# Patient Record
Sex: FEMALE | Race: OTHER | HISPANIC OR LATINO | URBAN - METROPOLITAN AREA
[De-identification: names, ages, dates, MRNs, and addresses within clinical notes are randomized per-mention and may not be internally consistent; named-entity substitution may affect disease eponyms.]

---

## 2019-07-05 ENCOUNTER — EMERGENCY (EMERGENCY)
Facility: HOSPITAL | Age: 84
LOS: 0 days | Discharge: HOME | End: 2019-07-05
Attending: EMERGENCY MEDICINE | Admitting: EMERGENCY MEDICINE
Payer: MEDICAID

## 2019-07-05 VITALS
WEIGHT: 119.93 LBS | OXYGEN SATURATION: 97 % | RESPIRATION RATE: 16 BRPM | SYSTOLIC BLOOD PRESSURE: 187 MMHG | HEART RATE: 72 BPM | DIASTOLIC BLOOD PRESSURE: 79 MMHG | TEMPERATURE: 98 F

## 2019-07-05 DIAGNOSIS — M25.551 PAIN IN RIGHT HIP: ICD-10-CM

## 2019-07-05 DIAGNOSIS — E11.9 TYPE 2 DIABETES MELLITUS WITHOUT COMPLICATIONS: ICD-10-CM
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
## 2019-07-05 DIAGNOSIS — M25.512 PAIN IN LEFT SHOULDER: ICD-10-CM

## 2019-07-05 DIAGNOSIS — I10 ESSENTIAL (PRIMARY) HYPERTENSION: ICD-10-CM
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
## 2019-07-05 LAB
ALBUMIN SERPL ELPH-MCNC: 4.4 G/DL — SIGNIFICANT CHANGE UP (ref 3.5–5.2)
ALP SERPL-CCNC: 76 U/L — SIGNIFICANT CHANGE UP (ref 30–115)
ALT FLD-CCNC: 17 U/L — SIGNIFICANT CHANGE UP (ref 0–41)
ANION GAP SERPL CALC-SCNC: 12 MMOL/L — SIGNIFICANT CHANGE UP (ref 7–14)
APTT BLD: 29.3 SEC — SIGNIFICANT CHANGE UP (ref 27–39.2)
AST SERPL-CCNC: 19 U/L — SIGNIFICANT CHANGE UP (ref 0–41)
BASOPHILS # BLD AUTO: 0.07 K/UL — SIGNIFICANT CHANGE UP (ref 0–0.2)
BASOPHILS NFR BLD AUTO: 0.3 % — SIGNIFICANT CHANGE UP (ref 0–1)
BILIRUB SERPL-MCNC: 0.5 MG/DL — SIGNIFICANT CHANGE UP (ref 0.2–1.2)
BUN SERPL-MCNC: 18 MG/DL — SIGNIFICANT CHANGE UP (ref 10–20)
CALCIUM SERPL-MCNC: 9.9 MG/DL — SIGNIFICANT CHANGE UP (ref 8.5–10.1)
CHLORIDE SERPL-SCNC: 95 MMOL/L — LOW (ref 98–110)
CO2 SERPL-SCNC: 25 MMOL/L — SIGNIFICANT CHANGE UP (ref 17–32)
CREAT SERPL-MCNC: 0.6 MG/DL — LOW (ref 0.7–1.5)
EOSINOPHIL # BLD AUTO: 0.29 K/UL — SIGNIFICANT CHANGE UP (ref 0–0.7)
EOSINOPHIL NFR BLD AUTO: 1.3 % — SIGNIFICANT CHANGE UP (ref 0–8)
ETHANOL SERPL-MCNC: <10 MG/DL — SIGNIFICANT CHANGE UP
GLUCOSE SERPL-MCNC: 194 MG/DL — HIGH (ref 70–99)
HCT VFR BLD CALC: 39.7 % — SIGNIFICANT CHANGE UP (ref 37–47)
HGB BLD-MCNC: 14.4 G/DL — SIGNIFICANT CHANGE UP (ref 12–16)
IMM GRANULOCYTES NFR BLD AUTO: 0.2 % — SIGNIFICANT CHANGE UP (ref 0.1–0.3)
INR BLD: 0.92 RATIO — SIGNIFICANT CHANGE UP (ref 0.65–1.3)
LACTATE SERPL-SCNC: 2.7 MMOL/L — HIGH (ref 0.5–2.2)
LIDOCAIN IGE QN: 19 U/L — SIGNIFICANT CHANGE UP (ref 7–60)
LYMPHOCYTES # BLD AUTO: 16.53 K/UL — HIGH (ref 1.2–3.4)
LYMPHOCYTES # BLD AUTO: 73.7 % — HIGH (ref 20.5–51.1)
MCHC RBC-ENTMCNC: 31.5 PG — HIGH (ref 27–31)
MCHC RBC-ENTMCNC: 36.3 G/DL — SIGNIFICANT CHANGE UP (ref 32–37)
MCV RBC AUTO: 86.9 FL — SIGNIFICANT CHANGE UP (ref 81–99)
MONOCYTES # BLD AUTO: 1.07 K/UL — HIGH (ref 0.1–0.6)
MONOCYTES NFR BLD AUTO: 4.8 % — SIGNIFICANT CHANGE UP (ref 1.7–9.3)
NEUTROPHILS # BLD AUTO: 4.41 K/UL — SIGNIFICANT CHANGE UP (ref 1.4–6.5)
NEUTROPHILS NFR BLD AUTO: 19.7 % — LOW (ref 42.2–75.2)
NRBC # BLD: 0 /100 WBCS — SIGNIFICANT CHANGE UP (ref 0–0)
NRBC # BLD: 0 /100 — SIGNIFICANT CHANGE UP (ref 0–0)
PLAT MORPH BLD: NORMAL — SIGNIFICANT CHANGE UP
PLATELET # BLD AUTO: 235 K/UL — SIGNIFICANT CHANGE UP (ref 130–400)
PLATELET COUNT - ESTIMATE: NORMAL — SIGNIFICANT CHANGE UP
POTASSIUM SERPL-MCNC: 4.4 MMOL/L — SIGNIFICANT CHANGE UP (ref 3.5–5)
POTASSIUM SERPL-SCNC: 4.4 MMOL/L — SIGNIFICANT CHANGE UP (ref 3.5–5)
PROT SERPL-MCNC: 7.7 G/DL — SIGNIFICANT CHANGE UP (ref 6–8)
PROTHROM AB SERPL-ACNC: 10.6 SEC — SIGNIFICANT CHANGE UP (ref 9.95–12.87)
RBC # BLD: 4.57 M/UL — SIGNIFICANT CHANGE UP (ref 4.2–5.4)
RBC # FLD: 15 % — HIGH (ref 11.5–14.5)
RBC BLD AUTO: NORMAL — SIGNIFICANT CHANGE UP
SODIUM SERPL-SCNC: 132 MMOL/L — LOW (ref 135–146)
WBC # BLD: 22.42 K/UL — HIGH (ref 4.8–10.8)
WBC # FLD AUTO: 22.42 K/UL — HIGH (ref 4.8–10.8)

## 2019-07-05 PROCEDURE — 74177 CT ABD & PELVIS W/CONTRAST: CPT | Mod: 26

## 2019-07-05 PROCEDURE — 73030 X-RAY EXAM OF SHOULDER: CPT | Mod: 26,LT

## 2019-07-05 PROCEDURE — 71045 X-RAY EXAM CHEST 1 VIEW: CPT | Mod: 26

## 2019-07-05 PROCEDURE — 99284 EMERGENCY DEPT VISIT MOD MDM: CPT

## 2019-07-05 PROCEDURE — 72125 CT NECK SPINE W/O DYE: CPT | Mod: 26

## 2019-07-05 PROCEDURE — 70450 CT HEAD/BRAIN W/O DYE: CPT | Mod: 26

## 2019-07-05 PROCEDURE — 71260 CT THORAX DX C+: CPT | Mod: 26

## 2019-07-05 PROCEDURE — 72170 X-RAY EXAM OF PELVIS: CPT | Mod: 26

## 2019-07-05 RX ORDER — ACETAMINOPHEN 500 MG
975 TABLET ORAL ONCE
Refills: 0 | Status: COMPLETED | OUTPATIENT
Start: 2019-07-05 | End: 2019-07-05

## 2019-07-05 RX ORDER — ALPRAZOLAM 0.25 MG
2 TABLET ORAL ONCE
Refills: 0 | Status: DISCONTINUED | OUTPATIENT
Start: 2019-07-05 | End: 2019-07-05

## 2019-07-05 RX ADMIN — Medication 975 MILLIGRAM(S): at 16:49

## 2019-07-05 NOTE — ED PROVIDER NOTE - ATTENDING CONTRIBUTION TO CARE
Pt is a 84yo female who fell yesterday and hit the back of her head and lower back, R hip.  No LOC, AMS, HA, vomiting.  Reports R hip pain and trouble walking.    Exam: R hip tenderness, stbale pelvis, soft NT abdomen, no rib tenderness, no spinal tenderness, normal neuro exam, 2+ radial and DP pulses, NAD  Imp: R hip fx, r/o ICH  Plan: labs, trauma pan scan, xr

## 2019-07-05 NOTE — ED PROVIDER NOTE - PHYSICAL EXAMINATION
Physical Exam    Vital Signs: I have reviewed the initial vital signs.  Constitutional: well-nourished, appears stated age, no acute distress  Eyes: Conjunctiva pink, Sclera clear, PERRLA, EOMI.  Cardiovascular: S1 and S2, regular rate, regular rhythm, well-perfused extremities, radial pulses equal and 2+, pedal pulse 2+ and equal.   Respiratory: unlabored respiratory effort, clear to auscultation bilaterally no wheezing, rales and rhonchi  Gastrointestinal: soft, non-tender abdomen, no pulsatile mass, normal bowl sounds  Musculoskeletal: supple neck, no lower extremity edema, no midline tenderness, unable to range right hip 2/2 pain, ttp over right hip, stable pelvis, left shoulder from, ttp over ac joint, neck from, no cspine ttp, from of other joints.  Integumentary: warm, dry, no rash, no bruising,   Neurologic: awake, alert, cranial nerves II-XII grossly intact, extremities’ motor and sensory functions grossly intact

## 2019-07-05 NOTE — ED PROVIDER NOTE - OBJECTIVE STATEMENT
82 yo female, pmh of dm and htn, presents to ed for evaluation after fall. Pt with daughter, states slipped last night, landed on back and hit head. No loc, no ac use. Pt today co right hip pain, worse with movement, no better with tylenol, described as sharp pain. Additionally co of left shoulder pain, worse with rom, no different with tylenol, described as aching. denies cp, sob, cough, abd pain, nvd, ha, numbness, tingling, dizziness, rash, dysuria.

## 2019-07-05 NOTE — ED PROVIDER NOTE - NS ED ROS FT
Constitutional: (-) fever, (-) chills  Eyes: (-) visual changes  ENT: (-)epistaxis  Cardiovascular: (-) chest pain, (-) syncope  Respiratory: (-) cough, (-) shortness of breath, (-) dyspnea,   Gastrointestinal: (-) vomiting, (-) diarrhea, (-)nausea  Musculoskeletal: (-) neck pain, (-) back pain, (+) right hip and left shoulder pain  Integumentary: (-) rash, (-) edema, (-) bruises, (+) abrasion  Neurological: (-) headache, (-) loc, (-) dizziness, (-) tingling, (-)numbness,   Peripheral Vascular: (-) leg swelling  :  (-)dysuria  Allergic/Immunologic: (-) pruritus

## 2019-07-05 NOTE — ED ADULT TRIAGE NOTE - CHIEF COMPLAINT QUOTE
As per daughter: 'She tripped and fall. Complain of lower back pain and also hit her head in on Aspirin 81 mg but denies LOC."

## 2021-05-19 NOTE — ED ADULT TRIAGE NOTE - SOURCE OF INFORMATION
Will switch to Claritin from Zyrtec, trial Flonase and olopatadine drops   Discussed that medications help symptoms but generally do not fully resolve symptoms Daughter

## 2022-11-15 ENCOUNTER — EMERGENCY (EMERGENCY)
Facility: HOSPITAL | Age: 87
LOS: 0 days | Discharge: HOME | End: 2022-11-16
Attending: EMERGENCY MEDICINE | Admitting: EMERGENCY MEDICINE

## 2022-11-15 VITALS
RESPIRATION RATE: 17 BRPM | DIASTOLIC BLOOD PRESSURE: 109 MMHG | WEIGHT: 110.01 LBS | TEMPERATURE: 97 F | HEART RATE: 62 BPM | SYSTOLIC BLOOD PRESSURE: 200 MMHG | OXYGEN SATURATION: 99 %

## 2022-11-15 DIAGNOSIS — I10 ESSENTIAL (PRIMARY) HYPERTENSION: ICD-10-CM

## 2022-11-15 DIAGNOSIS — E11.65 TYPE 2 DIABETES MELLITUS WITH HYPERGLYCEMIA: ICD-10-CM

## 2022-11-15 DIAGNOSIS — Z91.138 PATIENT'S UNINTENTIONAL UNDERDOSING OF MEDICATION REGIMEN FOR OTHER REASON: ICD-10-CM

## 2022-11-15 DIAGNOSIS — Z79.84 LONG TERM (CURRENT) USE OF ORAL HYPOGLYCEMIC DRUGS: ICD-10-CM

## 2022-11-15 DIAGNOSIS — Z20.822 CONTACT WITH AND (SUSPECTED) EXPOSURE TO COVID-19: ICD-10-CM

## 2022-11-15 DIAGNOSIS — Z91.048 OTHER NONMEDICINAL SUBSTANCE ALLERGY STATUS: ICD-10-CM

## 2022-11-15 DIAGNOSIS — E78.5 HYPERLIPIDEMIA, UNSPECIFIED: ICD-10-CM
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
## 2022-11-15 DIAGNOSIS — T50.996A UNDERDOSING OF OTHER DRUGS, MEDICAMENTS AND BIOLOGICAL SUBSTANCES, INITIAL ENCOUNTER: ICD-10-CM

## 2022-11-15 DIAGNOSIS — D72.829 ELEVATED WHITE BLOOD CELL COUNT, UNSPECIFIED: ICD-10-CM
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
## 2022-11-15 LAB
ALBUMIN SERPL ELPH-MCNC: 4.2 G/DL — SIGNIFICANT CHANGE UP (ref 3.5–5.2)
ALP SERPL-CCNC: 128 U/L — HIGH (ref 30–115)
ALT FLD-CCNC: 17 U/L — SIGNIFICANT CHANGE UP (ref 0–41)
ANION GAP SERPL CALC-SCNC: 7 MMOL/L — SIGNIFICANT CHANGE UP (ref 7–14)
AST SERPL-CCNC: 15 U/L — SIGNIFICANT CHANGE UP (ref 0–41)
B-OH-BUTYR SERPL-SCNC: <0.2 MMOL/L — SIGNIFICANT CHANGE UP
BASE EXCESS BLDV CALC-SCNC: 4 MMOL/L — HIGH (ref -2–3)
BASOPHILS # BLD AUTO: 0.09 K/UL — SIGNIFICANT CHANGE UP (ref 0–0.2)
BASOPHILS NFR BLD AUTO: 0.3 % — SIGNIFICANT CHANGE UP (ref 0–1)
BILIRUB SERPL-MCNC: 0.4 MG/DL — SIGNIFICANT CHANGE UP (ref 0.2–1.2)
BUN SERPL-MCNC: 14 MG/DL — SIGNIFICANT CHANGE UP (ref 10–20)
CA-I SERPL-SCNC: 1.22 MMOL/L — SIGNIFICANT CHANGE UP (ref 1.15–1.33)
CALCIUM SERPL-MCNC: 9.7 MG/DL — SIGNIFICANT CHANGE UP (ref 8.4–10.5)
CHLORIDE SERPL-SCNC: 98 MMOL/L — SIGNIFICANT CHANGE UP (ref 98–110)
CO2 SERPL-SCNC: 28 MMOL/L — SIGNIFICANT CHANGE UP (ref 17–32)
CREAT SERPL-MCNC: 0.5 MG/DL — LOW (ref 0.7–1.5)
EGFR: 91 ML/MIN/1.73M2 — SIGNIFICANT CHANGE UP
EOSINOPHIL # BLD AUTO: 0.55 K/UL — SIGNIFICANT CHANGE UP (ref 0–0.7)
EOSINOPHIL NFR BLD AUTO: 1.9 % — SIGNIFICANT CHANGE UP (ref 0–8)
GAS PNL BLDV: 130 MMOL/L — LOW (ref 136–145)
GAS PNL BLDV: SIGNIFICANT CHANGE UP
GLUCOSE SERPL-MCNC: 175 MG/DL — HIGH (ref 70–99)
HCO3 BLDV-SCNC: 30 MMOL/L — HIGH (ref 22–29)
HCT VFR BLD CALC: 36.1 % — LOW (ref 37–47)
HCT VFR BLDA CALC: 38 % — LOW (ref 39–51)
HGB BLD CALC-MCNC: 12.6 G/DL — SIGNIFICANT CHANGE UP (ref 12.6–17.4)
HGB BLD-MCNC: 12.3 G/DL — SIGNIFICANT CHANGE UP (ref 12–16)
IMM GRANULOCYTES NFR BLD AUTO: 0.1 % — SIGNIFICANT CHANGE UP (ref 0.1–0.3)
LACTATE BLDV-MCNC: 0.6 MMOL/L — SIGNIFICANT CHANGE UP (ref 0.5–2)
LYMPHOCYTES # BLD AUTO: 24.21 K/UL — HIGH (ref 1.2–3.4)
LYMPHOCYTES # BLD AUTO: 82 % — HIGH (ref 20.5–51.1)
MAGNESIUM SERPL-MCNC: 2.1 MG/DL — SIGNIFICANT CHANGE UP (ref 1.8–2.4)
MANUAL SMEAR VERIFICATION: SIGNIFICANT CHANGE UP
MCHC RBC-ENTMCNC: 29 PG — SIGNIFICANT CHANGE UP (ref 27–31)
MCHC RBC-ENTMCNC: 34.1 G/DL — SIGNIFICANT CHANGE UP (ref 32–37)
MCV RBC AUTO: 85.1 FL — SIGNIFICANT CHANGE UP (ref 81–99)
MONOCYTES # BLD AUTO: 1.46 K/UL — HIGH (ref 0.1–0.6)
MONOCYTES NFR BLD AUTO: 4.9 % — SIGNIFICANT CHANGE UP (ref 1.7–9.3)
NEUTROPHILS # BLD AUTO: 3.18 K/UL — SIGNIFICANT CHANGE UP (ref 1.4–6.5)
NEUTROPHILS NFR BLD AUTO: 10.8 % — LOW (ref 42.2–75.2)
NRBC # BLD: 0 /100 WBCS — SIGNIFICANT CHANGE UP (ref 0–0)
NRBC # BLD: 0 /100 — SIGNIFICANT CHANGE UP (ref 0–0)
PCO2 BLDV: 48 MMHG — HIGH (ref 39–42)
PH BLDV: 7.4 — SIGNIFICANT CHANGE UP (ref 7.32–7.43)
PLAT MORPH BLD: NORMAL — SIGNIFICANT CHANGE UP
PLATELET # BLD AUTO: 182 K/UL — SIGNIFICANT CHANGE UP (ref 130–400)
PO2 BLDV: 16 MMHG — SIGNIFICANT CHANGE UP
POTASSIUM BLDV-SCNC: 5.6 MMOL/L — HIGH (ref 3.5–5.1)
POTASSIUM SERPL-MCNC: 4.6 MMOL/L — SIGNIFICANT CHANGE UP (ref 3.5–5)
POTASSIUM SERPL-SCNC: 4.6 MMOL/L — SIGNIFICANT CHANGE UP (ref 3.5–5)
PROT SERPL-MCNC: 7 G/DL — SIGNIFICANT CHANGE UP (ref 6–8)
RBC # BLD: 4.24 M/UL — SIGNIFICANT CHANGE UP (ref 4.2–5.4)
RBC # FLD: 14 % — SIGNIFICANT CHANGE UP (ref 11.5–14.5)
RBC BLD AUTO: NORMAL — SIGNIFICANT CHANGE UP
SAO2 % BLDV: 20.5 % — SIGNIFICANT CHANGE UP
SODIUM SERPL-SCNC: 133 MMOL/L — LOW (ref 135–146)
VARIANT LYMPHS # BLD: 15 % — HIGH (ref 0–5)
WBC # BLD: 29.53 K/UL — HIGH (ref 4.8–10.8)
WBC # FLD AUTO: 29.53 K/UL — HIGH (ref 4.8–10.8)

## 2022-11-15 PROCEDURE — 93010 ELECTROCARDIOGRAM REPORT: CPT

## 2022-11-15 PROCEDURE — 99285 EMERGENCY DEPT VISIT HI MDM: CPT

## 2022-11-15 RX ORDER — SODIUM CHLORIDE 9 MG/ML
1000 INJECTION, SOLUTION INTRAVENOUS ONCE
Refills: 0 | Status: COMPLETED | OUTPATIENT
Start: 2022-11-15 | End: 2022-11-15

## 2022-11-15 RX ADMIN — SODIUM CHLORIDE 1000 MILLILITER(S): 9 INJECTION, SOLUTION INTRAVENOUS at 22:15

## 2022-11-15 NOTE — ED ADULT TRIAGE NOTE - CHIEF COMPLAINT QUOTE
pt has not taken her at home medication for about a week fs at home 300. Pt feels good " no complaints" as per daughter

## 2022-11-15 NOTE — ED ADULT NURSE NOTE - CODE STROKE ACTIVE YN
Outpatient Physical Therapy Ortho Treatment Note/Discharge Summary  [facility]     Patient Name: Mela Rodrigues  : 1988  MRN: [id number]  Today's Date: 8/15/2022      Visit Date: 08/15/2022     Patient seen for 6 PT sessions.  Patient reports 10-15% of improvement.  Next MD appt: PRN.  Recertification: N/A.    Therapy Diagnosis: Chronic LBP        Visit Dx:    ICD-10-CM ICD-9-CM   1. Lumbar back pain  M54.50 724.2   2. Chronic low back pain without sciatica, unspecified back pain laterality  M54.50 724.2    G89.29 338.29       There is no problem list on file for this patient.       Past Medical History:   Diagnosis Date   • Anxiety    • Asthma    • Diabetes mellitus (HCC)    • Hypertension         No past surgical history on file.     PT Ortho     Row Name 08/15/22 1000       Precautions and Contraindications    Precautions/Limitations no known precautions/limitations  -AJ       Head/Neck/Trunk    Trunk Extension AROM 20°  -AJ    Trunk Flexion AROM 72°  -AJ    Trunk Lt Lateral Flexion AROM 100% of range, WNL  -AJ    Trunk Rt Lateral Flexion AROM 100% of range, WNL  -AJ    Trunk Lt Rotation AROM 75% of range  -AJ    Trunk Rt Rotation AROM 75% of range  -AJ       MMT (Manual Muscle Testing)    General MMT Comments B hip flexion 4-/5  -AJ          User Key  (r) = Recorded By, (t) = Taken By, (c) = Cosigned By    Initials Name Provider Type    Concepcion Carpio, PT DPT Physical Therapist                             PT Assessment/Plan     Row Name 08/15/22 1100          PT Assessment    Assessment Comments Patient has met most goals and still only reports 10-15% improvement overall. She has loss of AROM and strength in hip flexors still with no significant changes in those since initial evaluation. Has completed 6 visits of PT and is I with all HEP exercises. Issued GTB for HEP progression. Also showed good ergonomics with household tasks after instruction.  -AJ            PT Plan    PT  "Frequency --  N/A  -AJ     PT Plan Comments D/C today with final HEP.  -AJ           User Key  (r) = Recorded By, (t) = Taken By, (c) = Cosigned By    Initials Name Provider Type    Concepcion Carpio, PT DPT Physical Therapist                     OP Exercises     Row Name 08/15/22 1000             Subjective Comments    Subjective Comments patient reports she has bene fighting spasms and a migrane.  -AJ              Subjective Pain    Able to rate subjective pain? yes  -AJ      Pre-Treatment Pain Level --  5-6/10  -AJ              Exercise 1    Exercise Name 1 Pro II LE bike for posture re ed, strength and endurance  -AJ      Time 1 10 minutes  -AJ      Additional Comments L 7.0  -AJ              Exercise 2    Exercise Name 2 B St. HS S  -AJ      Reps 2 2  -AJ      Time 2 30 seconds  -AJ              Exercise 3    Exercise Name 3 B sitting piriformis S  -AJ      Reps 3 2  -AJ      Time 3 30 seconds  -AJ              Exercise 4    Exercise Name 4 Sit to stands with umbar extensions  -AJ      Reps 4 20  -AJ      Time 4 5\" hold  -AJ              Exercise 5    Exercise Name 5 measurements  -AJ              Exercise 6    Exercise Name 6 B Fwd step up with opp hip ext  -AJ      Reps 6 15 each  -AJ      Additional Comments 6 step  -AJ              Exercise 7    Exercise Name 7 B lateral step up with opp hip abd  -AJ      Reps 7 15 each LE  -AJ      Additional Comments 6\" step  -AJ              Exercise 8    Exercise Name 8 B retro step up with opp hip flexion  -AJ      Reps 8 15 each  -AJ      Additional Comments 6\" step  -AJ              Exercise 9    Exercise Name 9 Bridges  -AJ      Reps 9 20  -AJ      Time 9 5\" hold  -AJ      Additional Comments UE \"X\"  -AJ              Exercise 10    Exercise Name 10 HL B hip add  -AJ      Reps 10 20  -AJ      Time 10 5\" hold  -AJ              Exercise 11    Exercise Name 11 HL B hip abd  -AJ      Reps 11 20  -AJ      Time 11 5\" hold  -AJ      Additional Comments GTB  -AJ       " "       Exercise 12    Exercise Name 12 HLM  -      Time 12 5\" holds alt for 3 minutes  -      Additional Comments GTB  -              Exercise 13    Exercise Name 13 B SKTC S  -      Reps 13 2  -      Time 13 30 seconds  -              Exercise 14    Exercise Name 14 LTR  -      Reps 14 10  -      Time 14 10\" hold  -              Exercise 15    Exercise Name 15 housework ergnomics  -              Exercise 16    Exercise Name 16 lifting techniques  -            User Key  (r) = Recorded By, (t) = Taken By, (c) = Cosigned By    Initials Name Provider Type    Concepcion Carpio, PT DPT Physical Therapist            All therapeutic interventions performed today were to address current functional limitations and/or deficits in addressing all physical therapy goals.                      PT OP Goals     Row Name 08/15/22 1100          PT Short Term Goals    STG 1 I with HEP and have additions/changes by next recertification  -     STG 1 Progress Met  -     STG 2 Patient able to show proper log roll technique.  -     STG 2 Progress Progressing  -     STG 3 Patient to be more aware of posture and posture correction techniques  -     STG 3 Progress Met  -     STG 4 AROM B Lumbar ROT >= 75% of range  -     STG 4 Progress Met  -     STG 5 AROM lumbar extension >= 20°.  -     STG 5 Progress Met  -     STG 6 B hip flexion >= 4+/5  -     STG 6 Progress Not Met  -            Long Term Goals    LTG 1 Patient to have AROM for the lumbar spine all WNL, no increase in pain.  -     LTG 1 Progress Partially Met  -     LTG 2 B LE 5/5 with no increase in pain.  -     LTG 2 Progress Partially Met  -     LTG 3 Patient able to perform 20 Bridges with UE \"X\" over Pball with no increase in pain.  -     LTG 3 Progress Met  -     LTG 4 Patient able to control hyperextension present in B knees  -     LTG 4 Progress Partially Met  -     LTG 4 Progress Comments Still requires some " cueing  -     LTG 5 Patient able to show proper lifting technique floor to waist with no increase in pain.  -     LTG 5 Progress Met  -     LTG 6 Patient able to show proper ergonomics for mopping/sweeping/vacuuming.  -     LTG 6 Progress Met  -     LTG 7 I with final HEP.  -     LTG 7 Progress Met  -            Time Calculation    PT Goal Re-Cert Due Date --  N/A  -           User Key  (r) = Recorded By, (t) = Taken By, (c) = Cosigned By    Initials Name Provider Type    Concepcion Carpio, PT DPT Physical Therapist                Therapy Education  Given: HEP, Posture/body mechanics  Program: Reinforced, New  How Provided: Verbal, Demonstration, Written  Provided to: Patient  Level of Understanding: Teach back education performed, Verbalized, Demonstrated              Time Calculation:   Start Time: 1048  Stop Time: 1145  Time Calculation (min): 57 min  Total Timed Code Minutes- PT: 57 minute(s)  Therapy Charges for Today     Code Description Service Date Service Provider Modifiers Qty    59690627593  PT THERAPEUTIC ACT EA 15 MIN 8/15/2022 Concepcion Peña, PT DPT GP 1    [id number]  PT THER SUPP EA 15 MIN 8/15/2022 Concepcion Peña, PT DPT GP 1    [id number] HC PT THER PROC EA 15 MIN 8/15/2022 Concepcion Peña, PT DPT GP 3                OP PT Discharge Summary  Date of Discharge: 08/15/22  Reason for Discharge: Independent  Outcomes Achieved: Patient able to partially acheive established goals, Refer to plan of care for updates on goals achieved  Discharge Destination: Home with home program      This document has been electronically signed by Concepcion Peña PT DPT, CSCS on August 15, 2022 11:49 CDT     No

## 2022-11-15 NOTE — ED ADULT NURSE NOTE - NSIMPLEMENTINTERV_GEN_ALL_ED
Implemented All Universal Safety Interventions:  [place] to call system. Call bell, personal items and telephone within reach. Instruct patient to call for assistance. Room bathroom lighting operational. Non-slip footwear when patient is off stretcher. Physically safe environment: no spills, clutter or unnecessary equipment. Stretcher in lowest position, wheels locked, appropriate side rails in place.

## 2022-11-16 VITALS
HEART RATE: 65 BPM | RESPIRATION RATE: 18 BRPM | OXYGEN SATURATION: 99 % | SYSTOLIC BLOOD PRESSURE: 160 MMHG | DIASTOLIC BLOOD PRESSURE: 71 MMHG

## 2022-11-16 PROBLEM — E11.9 TYPE 2 DIABETES MELLITUS WITHOUT COMPLICATIONS: Chronic | Status: ACTIVE | Noted: 2019-07-05

## 2022-11-16 PROBLEM — I10 ESSENTIAL (PRIMARY) HYPERTENSION: Chronic | Status: ACTIVE | Noted: 2019-07-05

## 2022-11-16 LAB — SARS-COV-2 RNA SPEC QL NAA+PROBE: SIGNIFICANT CHANGE UP

## 2022-11-16 RX ORDER — LOSARTAN POTASSIUM 100 MG/1
1 TABLET, FILM COATED ORAL
Qty: 30 | Refills: 0
Start: 2022-11-16 | End: 2022-11-30

## 2022-11-16 RX ORDER — HYDROCHLOROTHIAZIDE 25 MG
1 TABLET ORAL
Qty: 15 | Refills: 0
Start: 2022-11-16 | End: 2022-11-30

## 2022-11-16 RX ORDER — METFORMIN HYDROCHLORIDE 850 MG/1
1 TABLET ORAL
Qty: 30 | Refills: 0
Start: 2022-11-16 | End: 2022-11-30

## 2022-11-16 RX ORDER — ATORVASTATIN CALCIUM 80 MG/1
1 TABLET, FILM COATED ORAL
Qty: 15 | Refills: 0
Start: 2022-11-16 | End: 2022-11-30

## 2022-11-16 RX ORDER — NIFEDIPINE 30 MG
1 TABLET, EXTENDED RELEASE 24 HR ORAL
Qty: 15 | Refills: 0
Start: 2022-11-16 | End: 2022-11-30

## 2022-11-16 NOTE — ED PROVIDER NOTE - PROGRESS NOTE DETAILS
Patient remained asymptomatic while in ED.  Labs significant for leukocytosis, patient does not have any signs or symptoms of infection.  Discussed results with patient and daughter at bedside instructed to follow-up with PMD and hematology for further testing.  Return precautions provided, patient agreeable to discharge.

## 2022-11-16 NOTE — ED PROVIDER NOTE - PHYSICAL EXAMINATION
VITAL SIGNS: I have reviewed nursing notes and confirm.  CONSTITUTIONAL: Well-developed; well-nourished; in no acute distress.  SKIN: Skin exam is warm and dry, no acute rash.  HEAD: Normocephalic; atraumatic.  EYES: Conjunctiva and sclera clear.  ENT: No nasal discharge; airway clear.   CARD: S1, S2 normal; no murmurs, gallops, or rubs. Regular rate and rhythm.  RESP: No wheezes, rales or rhonchi. Speaking in full sentences.   ABD: Normal bowel sounds; soft; non-distended; non-tender; No rebound or guarding. No CVA tenderness.  EXT: Normal ROM. No clubbing, cyanosis or edema.   NEURO: Alert, oriented. Grossly unremarkable. No focal deficits.

## 2022-11-16 NOTE — ED PROVIDER NOTE - NSFOLLOWUPINSTRUCTIONS_ED_ALL_ED_FT
Hyperglycemia  Hyperglycemia occurs when the level of sugar (glucose) in the blood is too high. Glucose is a type of sugar that provides the body's main source of energy. Certain hormones (insulin and glucagon) control the level of glucose in the blood. Insulin lowers blood glucose, and glucagon increases blood glucose. Hyperglycemia can result from having too little insulin in the bloodstream, or from the body not responding normally to insulin.  Hyperglycemia occurs most often in people who have diabetes (diabetes mellitus), but it can happen in people who do not have diabetes. It can develop quickly, and it can be life-threatening if it causes you to become severely dehydrated (diabetic ketoacidosis or hyperglycemic hyperosmolar state). Severe hyperglycemia is a medical emergency.  What are the causes?  If you have diabetes, hyperglycemia may be caused by:  Diabetes medicine.Medicines that increase blood glucose or affect your diabetes control.Not eating enough, or not eating often enough.Changes in physical activity level.Being sick or having an infection.If you have prediabetes or undiagnosed diabetes:  Hyperglycemia may be caused by those conditions.If you do not have diabetes, hyperglycemia may be caused by:  Certain medicines, including steroid medicines, beta-blockers, epinephrine, and thiazide diuretics.Stress.Serious illness.Surgery.Diseases of the pancreas.Infection.What increases the risk?  Hyperglycemia is more likely to develop in people who have risk factors for diabetes, such as:  Having a family member with diabetes.Having a gene for type 1 diabetes that is passed from parent to child (inherited).Living in an area with cold weather conditions.Exposure to certain viruses.Certain conditions in which the body's disease-fighting (immune) system attacks itself (autoimmune disorders).Being overweight or obese.Having an inactive (sedentary) lifestyle.Having been diagnosed with insulin resistance.Having a history of prediabetes, gestational diabetes, or polycystic ovarian syndrome (PCOS).Being of American-, -American, /, or / descent.What are the signs or symptoms?  Hyperglycemia may not cause any symptoms. If you do have symptoms, they may include early warning signs, such as:  Increased thirst.Hunger.Feeling very tired.Needing to urinate more often than usual.Blurry vision.Other symptoms may develop if hyperglycemia gets worse, such as:  Dry mouth.Loss of appetite.Fruity-smelling breath.Weakness.Unexpected or rapid weight gain or weight loss.Tingling or numbness in the hands or feet.Headache.Skin that does not quickly return to normal after being lightly pinched and released (poor skin turgor).Abdominal pain.Cuts or bruises that are slow to heal.How is this diagnosed?  Hyperglycemia is diagnosed with a blood test to measure your blood glucose level. This blood test is usually done while you are having symptoms. Your health care provider may also do a physical exam and review your medical history.  You may have more tests to determine the cause of your hyperglycemia, such as:  A fasting blood glucose (FBG) test. You will not be allowed to eat (you will fast) for at least 8 hours before a blood sample is taken.An A1c (hemoglobin A1c) blood test. This provides information about blood glucose control over the previous 2–3 months.An oral glucose tolerance test (OGTT). This measures your blood glucose at two times:  After fasting. This is your baseline blood glucose level.Two hours after drinking a beverage that contains glucose.How is this treated?  Treatment depends on the cause of your hyperglycemia. Treatment may include:  Taking medicine to regulate your blood glucose levels. If you take insulin or other diabetes medicines, your medicine or dosage may be adjusted.Lifestyle changes, such as exercising more, eating healthier foods, or losing weight.Treating an illness or infection, if this caused your hyperglycemia.Checking your blood glucose more often.Stopping or reducing steroid medicines, if these caused your hyperglycemia.If your hyperglycemia becomes severe and it results in hyperglycemic hyperosmolar state, you must be hospitalized and given IV fluids.  Follow these instructions at home:  Image   General instructions     Take over-the-counter and prescription medicines only as told by your health care provider.Do not use any products that contain nicotine or tobacco, such as cigarettes and e-cigarettes. If you need help quitting, ask your health care provider.Limit alcohol intake to no more than 1 drink per day for nonpregnant women and 2 drinks per day for men. One drink equals 12 oz of beer, 5 oz of wine, or 1½ oz of hard liquor.Learn to manage stress. If you need help with this, ask your health care provider.Keep all follow-up visits as told by your health care provider. This is important.Eating and drinking     Image   Maintain a healthy weight.Exercise regularly, as directed by your health care provider.Stay hydrated, especially when you exercise, get sick, or spend time in hot temperatures.Eat healthy foods, such as:  Lean proteins.Complex carbohydrates.Fresh fruits and vegetables.Low-fat dairy products.Healthy fats.Drink enough fluid to keep your urine clear or pale yellow.If you have diabetes:     Make sure you know the symptoms of hyperglycemia.Follow your diabetes management plan, as told by your health care provider. Make sure you:  Take your insulin and medicines as directed.Follow your exercise plan.Follow your meal plan. Eat on time, and do not skip meals.Check your blood glucose as often as directed. Make sure to check your blood glucose before and after exercise. If you exercise longer or in a different way than usual, check your blood glucose more often.Follow your sick day plan whenever you cannot eat or drink normally. Make this plan in advance with your health care provider.Share your diabetes management plan with people in your workplace, school, and household.Check your urine for ketones when you are ill and as told by your health care provider.Carry a medical alert card or wear medical alert jewelry.Contact a health care provider if:  Your blood glucose is at or above 240 mg/dL (13.3 mmol/L) for 2 days in a row.You have problems keeping your blood glucose in your target range.You have frequent episodes of hyperglycemia.Get help right away if:  You have difficulty breathing.You have a change in how you think, feel, or act (mental status).You have nausea or vomiting that does not go away.These symptoms may represent a serious problem that is an emergency. Do not wait to see if the symptoms will go away. Get medical help right away. Call your local emergency services (911 in the [place] Do not drive yourself to the hospital.   Summary  Hyperglycemia occurs when the level of sugar (glucose) in the blood is too high.Hyperglycemia is diagnosed with a blood test to measure your blood glucose level. This blood test is usually done while you are having symptoms. Your health care provider may also do a physical exam and review your medical history.If you have diabetes, follow your diabetes management plan as told by your health care provider.Contact your health care provider if you have problems keeping your blood glucose in your target range.    Hypertension  Hypertension, commonly called high blood pressure, is when the force of blood pumping through the arteries is too strong. The arteries are the blood vessels that carry blood from the heart throughout the body. Hypertension forces the heart to work harder to pump blood and may cause arteries to become narrow or stiff. Having untreated or uncontrolled hypertension can cause heart attacks, strokes, kidney disease, and other problems.    A blood pressure reading consists of a higher number over a lower number. Ideally, your blood pressure should be below 120/80. The first ("top") number is called the systolic pressure. It is a measure of the pressure in your arteries as your heart beats. The second ("bottom") number is called the diastolic pressure. It is a measure of the pressure in your arteries as the heart relaxes.    What are the causes?  The cause of this condition is not known.    What increases the risk?  Some risk factors for high blood pressure are under your control. Others are not.    Factors you can change     Smoking.  Having type 2 diabetes mellitus, high cholesterol, or both.  Not getting enough exercise or physical activity.  Being overweight.  Having too much fat, sugar, calories, or salt (sodium) in your diet.  Drinking too much alcohol.  Factors that are difficult or impossible to change     Having chronic kidney disease.  Having a family history of high blood pressure.  Age. Risk increases with age.  Race. You may be at higher risk if you are -American.  Gender. Men are at higher risk than women before age 45. After age 65, women are at higher risk than men.  Having obstructive sleep apnea.  Stress.  What are the signs or symptoms?  Extremely high blood pressure (hypertensive crisis) may cause:    Headache.  Anxiety.  Shortness of breath.  Nosebleed.  Nausea and vomiting.  Severe chest pain.  Jerky movements you cannot control (seizures).    How is this diagnosed?  This condition is diagnosed by measuring your blood pressure while you are seated, with your arm resting on a surface. The cuff of the blood pressure monitor will be placed directly against the skin of your upper arm at the level of your heart. It should be measured at least twice using the same arm. Certain conditions can cause a difference in blood pressure between your right and left arms.    Certain factors can cause blood pressure readings to be lower or higher than normal (elevated) for a short period of time:    When your blood pressure is higher when you are in a health care provider's office than when you are at home, this is called white coat hypertension. Most people with this condition do not need medicines.  When your blood pressure is higher at home than when you are in a health care provider's office, this is called masked hypertension. Most people with this condition may need medicines to control blood pressure.    If you have a high blood pressure reading during one visit or you have normal blood pressure with other risk factors:    You may be asked to return on a different day to have your blood pressure checked again.  You may be asked to monitor your blood pressure at home for 1 week or longer.    If you are diagnosed with hypertension, you may have other blood or imaging tests to help your health care provider understand your overall risk for other conditions.    How is this treated?  This condition is treated by making healthy lifestyle changes, such as eating healthy foods, exercising more, and reducing your alcohol intake. Your health care provider may prescribe medicine if lifestyle changes are not enough to get your blood pressure under control, and if:    Your systolic blood pressure is above 130.  Your diastolic blood pressure is above 80.    Your personal target blood pressure may vary depending on your medical conditions, your age, and other factors.    Follow these instructions at home:  Eating and drinking     Eat a diet that is high in fiber and potassium, and low in sodium, added sugar, and fat. An example eating plan is called the DASH (Dietary Approaches to Stop Hypertension) diet. To eat this way:    Eat plenty of fresh fruits and vegetables. Try to fill half of your plate at each meal with fruits and vegetables.  Eat whole grains, such as whole wheat pasta, brown rice, or whole grain bread. Fill about one quarter of your plate with whole grains.  Eat or drink low-fat dairy products, such as skim milk or low-fat yogurt.  Avoid fatty cuts of meat, processed or cured meats, and poultry with skin. Fill about one quarter of your plate with lean proteins, such as fish, chicken without skin, beans, eggs, and tofu.  Avoid premade and processed foods. These tend to be higher in sodium, added sugar, and fat.    Reduce your daily sodium intake. Most people with hypertension should eat less than 1,500 mg of sodium a day.  ImageLimit alcohol intake to no more than 1 drink a day for nonpregnant women and 2 drinks a day for men. One drink equals 12 oz of beer, 5 oz of wine, or 1½ oz of hard liquor.  Lifestyle     Work with your health care provider to maintain a healthy body weight or to lose weight. Ask what an ideal weight is for you.  Get at least 30 minutes of exercise that causes your heart to beat faster (aerobic exercise) most days of the week. Activities may include walking, swimming, or biking.  Include exercise to strengthen your muscles (resistance exercise), such as pilates or lifting weights, as part of your weekly exercise routine. Try to do these types of exercises for 30 minutes at least 3 days a week.  Do not use any products that contain nicotine or tobacco, such as cigarettes and e-cigarettes. If you need help quitting, ask your health care provider.  Monitor your blood pressure at home as told by your health care provider.  Keep all follow-up visits as told by your health care provider. This is important.  Medicines     Take over-the-counter and prescription medicines only as told by your health care provider. Follow directions carefully. Blood pressure medicines must be taken as prescribed.  Do not skip doses of blood pressure medicine. Doing this puts you at risk for problems and can make the medicine less effective.  Ask your health care provider about side effects or reactions to medicines that you should watch for.  Contact a health care provider if:  You think you are having a reaction to a medicine you are taking.  You have headaches that keep coming back (recurring).  You feel dizzy.  You have swelling in your ankles.  You have trouble with your vision.  Get help right away if:  You develop a severe headache or confusion.  You have unusual weakness or numbness.  You feel faint.  You have severe pain in your chest or abdomen.  You vomit repeatedly.  You have trouble breathing.  Summary  Hypertension is when the force of blood pumping through your arteries is too strong. If this condition is not controlled, it may put you at risk for serious complications.  Your personal target blood pressure may vary depending on your medical conditions, your age, and other factors. For most people, a normal blood pressure is less than 120/80.  Hypertension is treated with lifestyle changes, medicines, or a combination of both. Lifestyle changes include weight loss, eating a healthy, low-sodium diet, exercising more, and limiting alcohol.  This information is not intended to replace advice given to you by your health care provider. Make sure you discuss any questions you have with your health care provider.

## 2022-11-16 NOTE — ED PROVIDER NOTE - ATTENDING APP SHARED VISIT CONTRIBUTION OF CARE
I was present for and supervised the key and critical aspects of the procedures performed during the care of the patient. Patient is an 87-year-old female past medical history of hypertension hyperlipidemia diabetes presents to the emergency department for evaluation of hypoglycemia states that on home fingerstick was approximately 300 patient is noncompliant with medications secondary to the fact that she cannot see her doctor she denies any complaints at this time denies any headache visual changes chest pain shortness of breath abdominal pain back pain fevers chills vomiting diarrhea no rashes noted    On physical exam patient is normocephalic atraumatic pupils equal round reactive accommodation extraocular muscles intact oropharynx clear chest clear  Bilaterally abdomen soft nontender nondistended bowel sounds positive no guarding rebound shortness forage motion pedal pulse 2+ radial pulse 2+ no focal deficits    Assessment plan patient given iv  fluids repeat Accu-Chek has improved patient is not in DKA or honk she has a history of an elevation in white blood cell count on prior labs on July 5, 2019 patient had a white blood cell count 22.42 patient is essentially asymptomatic I will discharge given information follow-up we discussed indications to return at this time

## 2022-11-16 NOTE — ED PROVIDER NOTE - OBJECTIVE STATEMENT
87-year-old female with past medical history of HTN, HLD, and DM presents to the ED for evaluation of hyperglycemia.  Patient has not had any of her medications for 1 week.  Patient did fingerstick today and noted her sugar to be 300 which prompted ED eval.  Patient denies any complaints, states she feels well.

## 2022-11-16 NOTE — ED PROVIDER NOTE - CARE PROVIDER_API CALL
Andre Gonsalez  Internal Medicine  N  DERIC SALVADOR, Phys,    Phone: ([phone]  Fax: ()-  Follow Up Time: 1-3 Days

## 2022-11-16 NOTE — ED PROVIDER NOTE - PATIENT PORTAL LINK FT
You can access the FollowMyHealth Patient Portal offered by [facility] by registering at the following website: [email or web address]. By joining [facility]’s FollowMyHealth portal, you will also be able to view your health information using other applications (apps) compatible with our system.

## 2022-11-16 NOTE — ED PROVIDER NOTE - CLINICAL SUMMARY MEDICAL DECISION MAKING FREE TEXT BOX
patient given iv  fluids repeat Accu-Chek has improved patient is not in DKA or honk she has a history of an elevation in white blood cell count on prior labs on July 5, 2019 patient had a white blood cell count 22.42 patient is essentially asymptomatic I will discharge given information follow-up we discussed indications to return at this time

## 2022-11-16 NOTE — ED PROVIDER NOTE - CARE PLAN
1 Principal Discharge DX:	Hyperglycemia  Secondary Diagnosis:	Leukocytosis  Secondary Diagnosis:	Elevated blood pressure reading with diagnosis of hypertension

## 2023-01-21 ENCOUNTER — EMERGENCY (EMERGENCY)
Facility: HOSPITAL | Age: 88
LOS: 0 days | Discharge: HOME | End: 2023-01-22
Attending: EMERGENCY MEDICINE | Admitting: EMERGENCY MEDICINE
Payer: MEDICAID

## 2023-01-21 VITALS
RESPIRATION RATE: 18 BRPM | DIASTOLIC BLOOD PRESSURE: 81 MMHG | TEMPERATURE: 97 F | HEART RATE: 71 BPM | WEIGHT: 100.09 LBS | SYSTOLIC BLOOD PRESSURE: 216 MMHG | OXYGEN SATURATION: 97 %

## 2023-01-21 VITALS
RESPIRATION RATE: 20 BRPM | DIASTOLIC BLOOD PRESSURE: 75 MMHG | HEART RATE: 70 BPM | SYSTOLIC BLOOD PRESSURE: 174 MMHG | OXYGEN SATURATION: 97 %

## 2023-01-21 DIAGNOSIS — R05.1 ACUTE COUGH: ICD-10-CM

## 2023-01-21 DIAGNOSIS — Z79.84 LONG TERM (CURRENT) USE OF ORAL HYPOGLYCEMIC DRUGS: ICD-10-CM

## 2023-01-21 DIAGNOSIS — E11.9 TYPE 2 DIABETES MELLITUS WITHOUT COMPLICATIONS: ICD-10-CM

## 2023-01-21 DIAGNOSIS — Z79.4 LONG TERM (CURRENT) USE OF INSULIN: ICD-10-CM

## 2023-01-21 DIAGNOSIS — I10 ESSENTIAL (PRIMARY) HYPERTENSION: ICD-10-CM

## 2023-01-21 DIAGNOSIS — E87.5 HYPERKALEMIA: ICD-10-CM

## 2023-01-21 DIAGNOSIS — H92.01 OTALGIA, RIGHT EAR: ICD-10-CM

## 2023-01-21 DIAGNOSIS — D72.829 ELEVATED WHITE BLOOD CELL COUNT, UNSPECIFIED: ICD-10-CM

## 2023-01-21 DIAGNOSIS — Z20.822 CONTACT WITH AND (SUSPECTED) EXPOSURE TO COVID-19: ICD-10-CM

## 2023-01-21 LAB
ALBUMIN SERPL ELPH-MCNC: 4.1 G/DL — SIGNIFICANT CHANGE UP (ref 3.5–5.2)
ALP SERPL-CCNC: 106 U/L — SIGNIFICANT CHANGE UP (ref 30–115)
ALT FLD-CCNC: 14 U/L — SIGNIFICANT CHANGE UP (ref 0–41)
ANION GAP SERPL CALC-SCNC: 9 MMOL/L — SIGNIFICANT CHANGE UP (ref 7–14)
APPEARANCE UR: CLEAR — SIGNIFICANT CHANGE UP
AST SERPL-CCNC: 13 U/L — SIGNIFICANT CHANGE UP (ref 0–41)
BACTERIA # UR AUTO: ABNORMAL
BASOPHILS # BLD AUTO: 0.11 K/UL — SIGNIFICANT CHANGE UP (ref 0–0.2)
BASOPHILS NFR BLD AUTO: 0.3 % — SIGNIFICANT CHANGE UP (ref 0–1)
BILIRUB SERPL-MCNC: 0.2 MG/DL — SIGNIFICANT CHANGE UP (ref 0.2–1.2)
BILIRUB UR-MCNC: NEGATIVE — SIGNIFICANT CHANGE UP
BUN SERPL-MCNC: 13 MG/DL — SIGNIFICANT CHANGE UP (ref 10–20)
CALCIUM SERPL-MCNC: 9.4 MG/DL — SIGNIFICANT CHANGE UP (ref 8.4–10.5)
CHLORIDE SERPL-SCNC: 95 MMOL/L — LOW (ref 98–110)
CO2 SERPL-SCNC: 27 MMOL/L — SIGNIFICANT CHANGE UP (ref 17–32)
COLOR SPEC: YELLOW — SIGNIFICANT CHANGE UP
CREAT SERPL-MCNC: 0.5 MG/DL — LOW (ref 0.7–1.5)
DIFF PNL FLD: NEGATIVE — SIGNIFICANT CHANGE UP
EGFR: 91 ML/MIN/1.73M2 — SIGNIFICANT CHANGE UP
ELLIPTOCYTES BLD QL SMEAR: SLIGHT — SIGNIFICANT CHANGE UP
EOSINOPHIL # BLD AUTO: 0.72 K/UL — HIGH (ref 0–0.7)
EOSINOPHIL NFR BLD AUTO: 2.1 % — SIGNIFICANT CHANGE UP (ref 0–8)
EPI CELLS # UR: ABNORMAL /HPF
FLUAV AG NPH QL: SIGNIFICANT CHANGE UP
FLUBV AG NPH QL: SIGNIFICANT CHANGE UP
GLUCOSE SERPL-MCNC: 170 MG/DL — HIGH (ref 70–99)
GLUCOSE UR QL: NEGATIVE MG/DL — SIGNIFICANT CHANGE UP
HCT VFR BLD CALC: 36 % — LOW (ref 37–47)
HGB BLD-MCNC: 11.9 G/DL — LOW (ref 12–16)
IMM GRANULOCYTES NFR BLD AUTO: 0.3 % — SIGNIFICANT CHANGE UP (ref 0.1–0.3)
KETONES UR-MCNC: NEGATIVE — SIGNIFICANT CHANGE UP
LEUKOCYTE ESTERASE UR-ACNC: ABNORMAL
LYMPHOCYTES # BLD AUTO: 26.06 K/UL — HIGH (ref 1.2–3.4)
LYMPHOCYTES # BLD AUTO: 76.5 % — HIGH (ref 20.5–51.1)
MANUAL SMEAR VERIFICATION: SIGNIFICANT CHANGE UP
MCHC RBC-ENTMCNC: 27.1 PG — SIGNIFICANT CHANGE UP (ref 27–31)
MCHC RBC-ENTMCNC: 33.1 G/DL — SIGNIFICANT CHANGE UP (ref 32–37)
MCV RBC AUTO: 82 FL — SIGNIFICANT CHANGE UP (ref 81–99)
MONOCYTES # BLD AUTO: 1.54 K/UL — HIGH (ref 0.1–0.6)
MONOCYTES NFR BLD AUTO: 4.5 % — SIGNIFICANT CHANGE UP (ref 1.7–9.3)
NEUTROPHILS # BLD AUTO: 5.53 K/UL — SIGNIFICANT CHANGE UP (ref 1.4–6.5)
NEUTROPHILS NFR BLD AUTO: 16.3 % — LOW (ref 42.2–75.2)
NITRITE UR-MCNC: NEGATIVE — SIGNIFICANT CHANGE UP
NRBC # BLD: 0 /100 WBCS — SIGNIFICANT CHANGE UP (ref 0–0)
NRBC # BLD: 0 /100 — SIGNIFICANT CHANGE UP (ref 0–0)
OVALOCYTES BLD QL SMEAR: SLIGHT — SIGNIFICANT CHANGE UP
PH UR: 7 — SIGNIFICANT CHANGE UP (ref 5–8)
PLAT MORPH BLD: NORMAL — SIGNIFICANT CHANGE UP
PLATELET # BLD AUTO: 248 K/UL — SIGNIFICANT CHANGE UP (ref 130–400)
POTASSIUM SERPL-MCNC: 5.5 MMOL/L — HIGH (ref 3.5–5)
POTASSIUM SERPL-SCNC: 5.5 MMOL/L — HIGH (ref 3.5–5)
PROT SERPL-MCNC: 6.7 G/DL — SIGNIFICANT CHANGE UP (ref 6–8)
PROT UR-MCNC: NEGATIVE MG/DL — SIGNIFICANT CHANGE UP
RBC # BLD: 4.39 M/UL — SIGNIFICANT CHANGE UP (ref 4.2–5.4)
RBC # FLD: 13.4 % — SIGNIFICANT CHANGE UP (ref 11.5–14.5)
RBC BLD AUTO: ABNORMAL
RBC CASTS # UR COMP ASSIST: SIGNIFICANT CHANGE UP /HPF
RSV RNA NPH QL NAA+NON-PROBE: SIGNIFICANT CHANGE UP
SARS-COV-2 RNA SPEC QL NAA+PROBE: SIGNIFICANT CHANGE UP
SODIUM SERPL-SCNC: 131 MMOL/L — LOW (ref 135–146)
SP GR SPEC: 1.02 — SIGNIFICANT CHANGE UP (ref 1.01–1.03)
UROBILINOGEN FLD QL: 0.2 MG/DL — SIGNIFICANT CHANGE UP
VARIANT LYMPHS # BLD: 1 % — SIGNIFICANT CHANGE UP (ref 0–5)
WBC # BLD: 34.05 K/UL — HIGH (ref 4.8–10.8)
WBC # FLD AUTO: 34.05 K/UL — HIGH (ref 4.8–10.8)
WBC UR QL: ABNORMAL /HPF

## 2023-01-21 PROCEDURE — 71046 X-RAY EXAM CHEST 2 VIEWS: CPT | Mod: 26

## 2023-01-21 PROCEDURE — 93010 ELECTROCARDIOGRAM REPORT: CPT

## 2023-01-21 PROCEDURE — 99285 EMERGENCY DEPT VISIT HI MDM: CPT

## 2023-01-21 RX ORDER — SODIUM ZIRCONIUM CYCLOSILICATE 10 G/10G
10 POWDER, FOR SUSPENSION ORAL ONCE
Refills: 0 | Status: COMPLETED | OUTPATIENT
Start: 2023-01-21 | End: 2023-01-21

## 2023-01-21 RX ADMIN — SODIUM ZIRCONIUM CYCLOSILICATE 10 GRAM(S): 10 POWDER, FOR SUSPENSION ORAL at 23:30

## 2023-01-21 NOTE — ED PROVIDER NOTE - PROVIDER TOKENS
FREE:[LAST:[YOUR PCP DR. LINDA],PHONE:[(   )    -],FAX:[(   )    -]],PROVIDER:[TOKEN:[[id number]],FOLLOWUP:[7-10 Days]],PROVIDER:[TOKEN:[[id number]],FOLLOWUP:[7-10 Days]],PROVIDER:[TOKEN:[[id number]],FOLLOWUP:[Routine]],PROVIDER:[TOKEN:[[id number]],FOLLOWUP:[Routine]],PROVIDER:[TOKEN:[[id number]],FOLLOWUP:[7-10 Days]]

## 2023-01-21 NOTE — ED PROVIDER NOTE - PHYSICAL EXAMINATION
Physical Exam    Vital Signs: I have reviewed the initial vital signs.  Constitutional: well-nourished, appears stated age, no acute distress  Eyes: Conjunctiva pink, Sclera clear, PERRLA, EOMI without pain.   Cardiovascular: S1 and S2, regular rate, regular rhythm, well-perfused extremities, radial pulses equal and 2+ b/l.   Respiratory: unlabored respiratory effort, clear to auscultation bilaterally no wheezing, rales and rhonchi. pt is speaking full sentences. no accessory muscle use.   Gastrointestinal: soft, non-tender, nondistended abdomen, no pulsatile mass, normal bowl sounds, no rebound, no guarding  Musculoskeletal: supple neck, no lower extremity edema, no calf tenderness, FROM of b/l upper and lower extremities.   Integumentary: warm, dry, no rash  Neurologic: awake, alert, extremities’ motor and sensory functions grossly intact.   Psychiatric: appropriate mood, appropriate affect

## 2023-01-21 NOTE — ED PROVIDER NOTE - PATIENT PORTAL LINK FT
You can access the FollowMyHealth Patient Portal offered by [facility] by registering at the following website: [email or web address]. By joining [facility]’s FollowMyHealth portal, you will also be able to view your health information using other applications (apps) compatible with our system.

## 2023-01-21 NOTE — ED PROVIDER NOTE - OBJECTIVE STATEMENT
88 y/o female with a PMH of DM, and HTN presents to the ED for evaluation of abnormal labs. pt daughter reports pt when to her new PCP Dr. Haq for a routine visit and was sent for blood work. pt daughter received a call today to visit the ED because pt WBC was 28 and sodium was 126. I reviewed pt chart and pt 07/5/2019 wbc was 22.4, and 11/14/2022 it was 29.53 and pt was made aware of this and advised to f/u with heme/onc. pt had negative legionella, and quateferon gold test with pcp. pt EBV test was positive. pt reports about a week and a half ago pt had a cough, was given a zpak by her pcp and the cough has resolved. pt has hx of cerumen impaction and goes to ENT for ear cleaning, pt has an appt with ENT 01/31. pt complaining of right ear pain. pt denies fever, chills, cough, sore throat, sob, chest pain, back pain, abdominal pain, n/v/d/c, headache, dizziness, urinary symptoms, recent travel, recent sick contacts, night sweats, hx of cancer, unintentional weight loss, or weakness.

## 2023-01-21 NOTE — ED PROVIDER NOTE - NSFOLLOWUPINSTRUCTIONS_ED_ALL_ED_FT
Leukocytosis    Leukocytosis means that a person has more white blood cells than normal. White blood cells are made in the bone marrow. Bone marrow is the spongy tissue inside of bones. The main job of white blood cells is to fight infection. Having too many white blood cells is a common condition. It can develop as a result of many types of medical problems.    CAUSES  This condition may be caused by various problems. In some cases, the bone marrow is normal but it is still making too many white blood cells. This could be the result of:    Infection.  Injury.  Physical stress.  Emotional stress.  Surgery.  Allergic reactions.  Tumors that do not start in the blood or bone marrow.  An inherited disease.  Certain medicines.  Pregnancy and labor.    In other cases, a person may have a bone marrow disorder that is causing the body to make too many white blood cells. Bone marrow disorders include:    Leukemia. This is a type of blood cancer.  Myeloproliferative disorders. These disorders cause blood cells to grow abnormally.    SYMPTOMS  Often, this condition causes no symptoms. Some people may have symptoms due to the medical problem that is causing their leukocytosis. These symptoms may include:    Bleeding.  Bruising.  Fever.  Night sweats.  Repeated infections.  Weakness.  Weight loss.    DIAGNOSIS  This condition is diagnosed with blood tests. It is often found when blood is tested as part of a routine physical exam. You may have other tests to help determine why you have too many white blood cells. These tests may include:    A complete blood count (CBC). This test measures all the types of blood cells in your body.  Chest X-rays, urine tests, or other tests to look for signs of infection.  Bone marrow aspiration. For this test, a needle is put into your bone. Cells from the bone marrow are removed through the needle, then they are examined under a microscope.  Other tests on the blood or bone marrow sample.    TREATMENT  Usually, treatment is not needed for leukocytosis. However, if a disorder is causing your leukocytosis, it will need to be treated. Treatment may include:    Antibiotic medicine if you have a bacterial infection.   Bone marrow transplant. This treatment replaces your diseased bone marrow with healthy cells that will grow new bone marrow.  Chemotherapy or biological therapies such as the use of antibodies. These treatments may be used to kill cancer cells or to decrease the number of white blood cells.    HOME CARE INSTRUCTIONS  Medicines    Take over-the-counter and prescription medicines only as told by your health care provider.  If you were prescribed an antibiotic medicine, take it as told by your health care provider. Do not stop taking the antibiotic even if you start to feel better.    Eating and Drinking    Eat foods that are low in saturated fats and high in fiber. Eat plenty of fruits and vegetables.  Drink enough fluid to keep your urine clear or pale yellow.  Limit your intake of caffeine and alcohol.    General Instructions    Maintain a healthy weight. Ask your health care provider what weight is best for you.  Do 30 minutes of exercise at least 5 times each week. Check with your health care provider before you start a new exercise routine.  Do not use tobacco products, including cigarettes, chewing tobacco, or e-cigarettes. If you need help quitting, ask your health care provider.  Keep all follow-up visits as told by your health care provider. This is important.    SEEK MEDICAL CARE IF:  You feel weak or more tired than usual.  You develop chills, a cough, or nasal congestion.  You have a fever.  You lose weight without trying.  You have night sweats.  You bruise easily.    SEEK IMMEDIATE MEDICAL CARE IF:  You bleed more than normal.  You have chest pain.  You have trouble breathing.  You have uncontrolled nausea or vomiting.  You feel dizzy or light-headed.    ADDITIONAL NOTES AND INSTRUCTIONS    Please follow up with your Primary MD in 24-48 hr.  Seek immediate medical care for any new/worsening signs or symptoms.    Hyperkalemia    Hyperkalemia is when you have too much potassium in your blood. Potassium is normally removed (excreted) from your body by your kidneys. If there is too much potassium in your blood, it can affect your heart’s ability to function.    What are the causes?    Hyperkalemia may be caused by:    Taking in too much potassium. You can do this by:    Using salt substitutes. They contain large amounts of potassium.  Taking potassium supplements.  Eating foods high in potassium.    Excreting too little potassium. This can happen if:    Your kidneys are not working properly. Kidney (renal) disease, including short- or long-term renal failure, is a very common cause of hyperkalemia.  You are taking medicines that lower your excretion of potassium.  You have Labolt disease.  You have a urinary tract blockage, such as kidney stones.  You are on treatment to mechanically clean your blood (dialysis) and you skip a treatment.    Releasing a high amount of potassium from your cells into your blood. This can happen with:    Injury to muscles (rhabdomyolysis) or other tissues. Most potassium is stored in your muscles.  Severe burns or infections.  Acidic blood plasma (acidosis). Acidosis can result from many diseases, such as uncontrolled diabetes.      What increases the risk?  The most common risk factor of hyperkalemia is kidney disease. Other risk factors of hyperkalemia include:    Labolt disease. This is a condition where your glands do not produce enough hormones.  Alcoholism or heavy drug use.  Using certain blood pressure medicines, such as angiotensin-converting enzyme (ACE) inhibitors, angiotensin II receptor blockers (ARBs), or potassium-sparing diuretics such as spironolactone.  Severe injury or burn.    What are the signs or symptoms?  Oftentimes, there are no signs or symptoms of hyperkalemia. However, when your potassium level becomes high enough, you may experience symptoms such as:    Irregular or very slow heartbeat.  Nausea.  Fatigue.  Tingling of the skin or numbness of the hands or feet.  Muscle weakness.  Fatigue.  Not being able to move (paralysis).    You may not have any symptoms of hyperkalemia.    How is this diagnosed?  Hyperkalemia may be diagnosed by:    Physical exam.  Blood tests.  ECG (electrocardiogram).  Discussion of prescription and non-prescription drug use.    How is this treated?  Treatment for hyperkalemia is often directed at the underlying cause. In some instances, treatment may include:    Insulin.  Glucose (sugar) and water solution given through a vein (intravenous or IV ).  Dialysis.  Medicines to remove the potassium from your body.  Medicines to move calcium from your bloodstream into your tissues.    Follow these instructions at home:  Take medicines only as directed by your health care provider.  Do not take any supplements, natural products, herbs, or vitamins without reviewing them with your health care provider. Certain supplements and natural food products can have high amounts of potassium.  Limit your alcohol intake as directed by your health care provider.  Stop illegal drug use. If you need help quitting, ask your health care provider.  Keep all follow-up visits as directed by your health care provider. This is important.  If you have kidney disease, you may need to follow a low potassium diet. A dietitian can help educate you on low potassium foods.  Contact a health care provider if:  You notice an irregular or very slow heartbeat.  You feel light-headed.  You feel weak.  You are nauseous.  You have tingling or numbness in your hands or feet.  Get help right away if:  You have shortness of breath.  You have chest pain or discomfort.  You pass out.  You have muscle paralysis.  This information is not intended to replace advice given to you by your health care provider. Make sure you discuss any questions you have with your health care provider.

## 2023-01-21 NOTE — ED PROVIDER NOTE - CARE PROVIDERS DIRECT ADDRESSES
,DirectAddress_Unknown,[email or web address] ([email or web address]

## 2023-01-21 NOTE — ED PROVIDER NOTE - CARE PROVIDER_API CALL
YOUR PCP DR. LINDA,   Phone: (   )    -  Fax: (   )    -  Follow Up Time:     Ethan Lopez)  Hematology; Internal Medicine; Medical Oncology  [address] [place] [address]  Phone: [phone]  Fax: [phone]  Follow Up Time: 7-10 Days    Parrish Oritz)  Hematology; Internal Medicine; Medical Oncology  [address] [place], [place] [id number]  Phone: [phone]  Fax: [phone]  Follow Up Time: 7-10 Days    Cristiana Martínez)  Hematology; Internal Medicine; Medical Oncology  [address] [place] [address]  Phone: [phone]  Fax: [phone]  Follow Up Time: Routine    Yomi Rosen)  Internal Medicine; Medical Oncology  [address] [place] [address]  Phone: [phone]  Fax: [phone]  Follow Up Time: Routine    Miguel Angel Michaud)  Hematology; Internal Medicine; Medical Oncology  [address] [place] [address]  Phone: [phone]  Fax: [phone]  Follow Up Time: 7-10 Days

## 2023-01-21 NOTE — ED PROVIDER NOTE - CONSIDERATION OF ADMISSION OBSERVATION
I considered admitting this patient secondary to abnormal labs.  However on chart review leukocytosis appears to be chronic. Consideration of Admission/Observation

## 2023-01-21 NOTE — ED PROVIDER NOTE - CLINICAL SUMMARY MEDICAL DECISION MAKING FREE TEXT BOX
Patient sent for further evaluation of elevated white blood cell count.  Work-up in the ED done to exclude sources of infection.  Chest x-ray is normal no opacities, urine negative for UTI.  Patient is asymptomatic at this time.  Old records reviewed and labs from July fifth, 2019 reviewed.  At that time white blood cell count was 22.4K.  Patient had an ED visit in November 2022 and white blood cell count during that visit was 29k.  I discussed this with daughter in detail.  She vaguely remembers ED staff mentioning something about white blood cell count but does not recall details.  Patient did not follow-up with hematology at that time.  Patient also found to have elevated potassium today.  There are no EKG changes including peaked T waves prolonged IA interval's or arrhythmia.  Patient treated with Lokelma.  Patient did eat multiple bananas today.  Patient instructed that she will need to have this repeated on an outpatient basis.  Importance of follow-up with hematology stressed to patient and daughter.  Copies of results given to her from last ED visit as well as today.

## 2023-01-21 NOTE — ED PROVIDER NOTE - ATTENDING APP SHARED VISIT CONTRIBUTION OF CARE
87-year-old female past medical history of diabetes, HTN presents with daughter for evaluation of abnormal blood work.  Patient met with new PCP this week and had blood work done.  Patient was found with elevated white blood cell count and low sodium.  Upon chart review white blood cell count was  28K and sodium was 126.  Patient reports that she feels well.  Patient had cough last week and was treated with antibiotics and has improved.  Patient without fever or chills, no weight loss, no nausea vomiting or diarrhea, no chest pain or shortness of breath, on exam patient in NAD, AAOx3, ambulating well with her cane, nontoxic, OP clear, no exudates, neck supple, no LAD, lungs CTA B/L, no rash, abdomen soft nontender nondistended

## 2023-01-21 NOTE — ED PROVIDER NOTE - PROGRESS NOTE DETAILS
FF: pt ate two bananas today. pt given lokelma. pt daughter advised to have pcp repeat potassium level. I discussed leukocystosis with pt and pt daughter. it appears pt wbc has been elevated since 2019 and pt has been advised in 2022 to f/u with hematologist but she has not. pt advised to f/u with heme/onc. pt advised of strict return precautions discussed at bedside. agreeable to dc. f/u with pcp and heme/onc.

## 2023-01-23 LAB
CULTURE RESULTS: SIGNIFICANT CHANGE UP
MANUAL DIF COMMENT BLD-IMP: SIGNIFICANT CHANGE UP
SPECIMEN SOURCE: SIGNIFICANT CHANGE UP

## 2023-02-14 PROBLEM — Z00.00 ENCOUNTER FOR PREVENTIVE HEALTH EXAMINATION: Status: ACTIVE | Noted: 2023-02-14

## 2023-05-11 ENCOUNTER — EMERGENCY (EMERGENCY)
Facility: HOSPITAL | Age: 88
LOS: 0 days | Discharge: HOME | End: 2023-05-11
Attending: EMERGENCY MEDICINE
Payer: MEDICAID

## 2023-05-11 VITALS
TEMPERATURE: 97 F | DIASTOLIC BLOOD PRESSURE: 66 MMHG | HEART RATE: 76 BPM | HEIGHT: 60 IN | WEIGHT: 134.04 LBS | RESPIRATION RATE: 17 BRPM | OXYGEN SATURATION: 99 % | SYSTOLIC BLOOD PRESSURE: 152 MMHG

## 2023-05-11 DIAGNOSIS — E11.9 TYPE 2 DIABETES MELLITUS WITHOUT COMPLICATIONS: ICD-10-CM

## 2023-05-11 DIAGNOSIS — I10 ESSENTIAL (PRIMARY) HYPERTENSION: ICD-10-CM

## 2023-05-11 DIAGNOSIS — R11.10 VOMITING, UNSPECIFIED: ICD-10-CM

## 2023-05-11 DIAGNOSIS — M54.50 LOW BACK PAIN, UNSPECIFIED: ICD-10-CM
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
## 2023-05-11 DIAGNOSIS — Z91.018 ALLERGY TO OTHER FOODS: ICD-10-CM

## 2023-05-11 DIAGNOSIS — S00.83XA CONTUSION OF OTHER PART OF HEAD, INITIAL ENCOUNTER: ICD-10-CM

## 2023-05-11 DIAGNOSIS — Z79.84 LONG TERM (CURRENT) USE OF ORAL HYPOGLYCEMIC DRUGS: ICD-10-CM

## 2023-05-11 DIAGNOSIS — M79.651 PAIN IN RIGHT THIGH: ICD-10-CM
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
## 2023-05-11 LAB
ALBUMIN SERPL ELPH-MCNC: 4.4 G/DL — SIGNIFICANT CHANGE UP (ref 3.5–5.2)
ALP SERPL-CCNC: 83 U/L — SIGNIFICANT CHANGE UP (ref 30–115)
ALT FLD-CCNC: 16 U/L — SIGNIFICANT CHANGE UP (ref 0–41)
ANION GAP SERPL CALC-SCNC: 14 MMOL/L — SIGNIFICANT CHANGE UP (ref 7–14)
APTT BLD: 30.8 SEC — SIGNIFICANT CHANGE UP (ref 27–39.2)
AST SERPL-CCNC: 18 U/L — SIGNIFICANT CHANGE UP (ref 0–41)
BASOPHILS # BLD AUTO: 0.08 K/UL — SIGNIFICANT CHANGE UP (ref 0–0.2)
BASOPHILS NFR BLD AUTO: 0.3 % — SIGNIFICANT CHANGE UP (ref 0–1)
BILIRUB SERPL-MCNC: 0.4 MG/DL — SIGNIFICANT CHANGE UP (ref 0.2–1.2)
BUN SERPL-MCNC: 14 MG/DL — SIGNIFICANT CHANGE UP (ref 10–20)
BURR CELLS BLD QL SMEAR: SIGNIFICANT CHANGE UP
CALCIUM SERPL-MCNC: 9.9 MG/DL — SIGNIFICANT CHANGE UP (ref 8.4–10.5)
CHLORIDE SERPL-SCNC: 88 MMOL/L — LOW (ref 98–110)
CO2 SERPL-SCNC: 25 MMOL/L — SIGNIFICANT CHANGE UP (ref 17–32)
CREAT SERPL-MCNC: 0.5 MG/DL — LOW (ref 0.7–1.5)
EGFR: 91 ML/MIN/1.73M2 — SIGNIFICANT CHANGE UP
EOSINOPHIL # BLD AUTO: 0.39 K/UL — SIGNIFICANT CHANGE UP (ref 0–0.7)
EOSINOPHIL NFR BLD AUTO: 1.3 % — SIGNIFICANT CHANGE UP (ref 0–8)
GLUCOSE SERPL-MCNC: 149 MG/DL — HIGH (ref 70–99)
HCT VFR BLD CALC: 31.3 % — LOW (ref 37–47)
HGB BLD-MCNC: 11.1 G/DL — LOW (ref 12–16)
IMM GRANULOCYTES NFR BLD AUTO: 0.2 % — SIGNIFICANT CHANGE UP (ref 0.1–0.3)
INR BLD: 0.85 RATIO — SIGNIFICANT CHANGE UP (ref 0.65–1.3)
LYMPHOCYTES # BLD AUTO: 25.25 K/UL — HIGH (ref 1.2–3.4)
LYMPHOCYTES # BLD AUTO: 81.1 % — HIGH (ref 20.5–51.1)
MCHC RBC-ENTMCNC: 29.8 PG — SIGNIFICANT CHANGE UP (ref 27–31)
MCHC RBC-ENTMCNC: 35.5 G/DL — SIGNIFICANT CHANGE UP (ref 32–37)
MCV RBC AUTO: 83.9 FL — SIGNIFICANT CHANGE UP (ref 81–99)
MONOCYTES # BLD AUTO: 1.9 K/UL — HIGH (ref 0.1–0.6)
MONOCYTES NFR BLD AUTO: 6.1 % — SIGNIFICANT CHANGE UP (ref 1.7–9.3)
NEUTROPHILS # BLD AUTO: 3.45 K/UL — SIGNIFICANT CHANGE UP (ref 1.4–6.5)
NEUTROPHILS NFR BLD AUTO: 11 % — LOW (ref 42.2–75.2)
NRBC # BLD: 0 /100 WBCS — SIGNIFICANT CHANGE UP (ref 0–0)
NRBC # BLD: 0 /100 — SIGNIFICANT CHANGE UP (ref 0–0)
OVALOCYTES BLD QL SMEAR: SIGNIFICANT CHANGE UP
PLAT MORPH BLD: NORMAL — SIGNIFICANT CHANGE UP
PLATELET # BLD AUTO: 202 K/UL — SIGNIFICANT CHANGE UP (ref 130–400)
PLATELET COUNT - ESTIMATE: NORMAL — SIGNIFICANT CHANGE UP
PMV BLD: 8.2 FL — SIGNIFICANT CHANGE UP (ref 7.4–10.4)
POTASSIUM SERPL-MCNC: 4.8 MMOL/L — SIGNIFICANT CHANGE UP (ref 3.5–5)
POTASSIUM SERPL-SCNC: 4.8 MMOL/L — SIGNIFICANT CHANGE UP (ref 3.5–5)
PROT SERPL-MCNC: 7.4 G/DL — SIGNIFICANT CHANGE UP (ref 6–8)
PROTHROM AB SERPL-ACNC: 9.6 SEC — LOW (ref 9.95–12.87)
RBC # BLD: 3.73 M/UL — LOW (ref 4.2–5.4)
RBC # FLD: 14.3 % — SIGNIFICANT CHANGE UP (ref 11.5–14.5)
RBC BLD AUTO: NORMAL — SIGNIFICANT CHANGE UP
SODIUM SERPL-SCNC: 127 MMOL/L — LOW (ref 135–146)
VARIANT LYMPHS # BLD: 2 % — SIGNIFICANT CHANGE UP (ref 0–5)
WBC # BLD: 31.12 K/UL — HIGH (ref 4.8–10.8)
WBC # FLD AUTO: 31.12 K/UL — HIGH (ref 4.8–10.8)

## 2023-05-11 PROCEDURE — 72125 CT NECK SPINE W/O DYE: CPT | Mod: MA

## 2023-05-11 PROCEDURE — 71260 CT THORAX DX C+: CPT | Mod: MA

## 2023-05-11 PROCEDURE — 73552 X-RAY EXAM OF FEMUR 2/>: CPT | Mod: RT

## 2023-05-11 PROCEDURE — 74177 CT ABD & PELVIS W/CONTRAST: CPT | Mod: 26,MA

## 2023-05-11 PROCEDURE — 71046 X-RAY EXAM CHEST 2 VIEWS: CPT | Mod: 26

## 2023-05-11 PROCEDURE — 85730 THROMBOPLASTIN TIME PARTIAL: CPT

## 2023-05-11 PROCEDURE — 70450 CT HEAD/BRAIN W/O DYE: CPT | Mod: 26,MA

## 2023-05-11 PROCEDURE — 70480 CT ORBIT/EAR/FOSSA W/O DYE: CPT | Mod: 26,59,MA

## 2023-05-11 PROCEDURE — 85025 COMPLETE CBC W/AUTO DIFF WBC: CPT

## 2023-05-11 PROCEDURE — 71260 CT THORAX DX C+: CPT | Mod: 26,MA

## 2023-05-11 PROCEDURE — 70480 CT ORBIT/EAR/FOSSA W/O DYE: CPT | Mod: MA,XU

## 2023-05-11 PROCEDURE — 71046 X-RAY EXAM CHEST 2 VIEWS: CPT

## 2023-05-11 PROCEDURE — 72125 CT NECK SPINE W/O DYE: CPT | Mod: 26,MA

## 2023-05-11 PROCEDURE — 70450 CT HEAD/BRAIN W/O DYE: CPT | Mod: MA

## 2023-05-11 PROCEDURE — 72170 X-RAY EXAM OF PELVIS: CPT | Mod: 26

## 2023-05-11 PROCEDURE — 74177 CT ABD & PELVIS W/CONTRAST: CPT | Mod: MA

## 2023-05-11 PROCEDURE — 73552 X-RAY EXAM OF FEMUR 2/>: CPT | Mod: 26,RT

## 2023-05-11 PROCEDURE — 72170 X-RAY EXAM OF PELVIS: CPT

## 2023-05-11 PROCEDURE — 99284 EMERGENCY DEPT VISIT MOD MDM: CPT | Mod: 25

## 2023-05-11 PROCEDURE — 80053 COMPREHEN METABOLIC PANEL: CPT

## 2023-05-11 PROCEDURE — 85610 PROTHROMBIN TIME: CPT

## 2023-05-11 PROCEDURE — 99284 EMERGENCY DEPT VISIT MOD MDM: CPT

## 2023-05-11 PROCEDURE — 36415 COLL VENOUS BLD VENIPUNCTURE: CPT

## 2023-05-11 NOTE — ED ADULT NURSE NOTE - CHIEF COMPLAINT QUOTE
lost balance and fell 5 days ago, down stairs no LOC, has hematoma to right eyebrow, b/l eyes bruising, pain to all of right side of body.

## 2023-05-11 NOTE — ED PROVIDER NOTE - ATTENDING APP SHARED VISIT CONTRIBUTION OF CARE
87-year-old female to ED status post fall.  Patient on Saturday had a fall down a full flight of stairs.  Her face and her right side fell onto the steps and extensive trauma all the way down.  Over the past few days not been feeling well she thought her pain get better but has gotten worse so came into the ED for evaluation.  Also complaining of pain to her lower lumbar region and her right thigh.  Afebrile vital signs stable exam as noted clear lungs bilaterally conjunctiva pink HEENT normal, regular rate and rhythm abdomen soft nontender a, extensive bruising to face , forehead and cheeks, + R CANDIS

## 2023-05-11 NOTE — ED ADULT TRIAGE NOTE - CHIEF COMPLAINT QUOTE
lost balance and fell 2 days ago, no LOC, has hematoma to right eyebrow, b/l eyes bruising, pain to all of right side of body. lost balance and fell 5 days ago, down stairs no LOC, has hematoma to right eyebrow, b/l eyes bruising, pain to all of right side of body.

## 2023-05-11 NOTE — ED PROVIDER NOTE - PATIENT PORTAL LINK FT
You can access the FollowMyHealth Patient Portal offered by [facility] by registering at the following website: [email or web address]. By joining [employer]’s FollowMyHealth portal, you will also be able to view your health information using other applications (apps) compatible with our system.

## 2023-05-11 NOTE — ED ADULT NURSE NOTE - NSFALLHARMRISKINTERV_ED_ALL_ED
Assistance OOB with selected safe patient handling equipment if applicable/Communicate risk of Fall with Harm to all staff, patient, and family/Provide visual cue: red socks, yellow wristband, yellow gown, etc/Reinforce activity limits and safety measures with patient and family/Bed in lowest position, wheels locked, appropriate side rails in place/Call bell, personal items and telephone in reach/Instruct patient to call for assistance before getting out of bed/chair/stretcher/Non-slip footwear applied when patient is off stretcher/[place] to call system/Physically safe environment - no spills, clutter or unnecessary equipment/Purposeful Proactive Rounding/Room/bathroom lighting operational, light cord in reach

## 2023-05-11 NOTE — ED PROVIDER NOTE - OBJECTIVE STATEMENT
86 y/o female presents to the ED s/p fall down flight of steps 5 days ago. patient vomited x 1 . patient with large amount of bruising to face. no visual changes. no epistaxis or dental injury. no neck pain. patient c/o lower back pain and bruising to right thigh. no knee or ankle pain. no cp, sob, hemoptysis. patient ambulates with cane. denies any headache, visual changes.

## 2023-05-11 NOTE — ED PROVIDER NOTE - PHYSICAL EXAMINATION
Vital Signs: I have reviewed the initial vital signs.  Constitutional: well-nourished, no acute distress, normocephalic  Eyes: PERRLA, EOMI,  clear conjunctiva, +swelling b/l periorbital region R>L, no subconjunctival hemorrhage  ENT: MMM, no nasal bone tenderness, no dental tenderness  Cardiovascular: regular rate, regular rhythm, no murmur appreciated  Respiratory: unlabored respiratory effort, clear to auscultation bilaterally, no chest wall tenderness  Gastrointestinal: soft, non-tender, non-distended  abdomen, no pulsatile mass  Musculoskeletal: supple neck, no cervical tenderness, pelvis stable,  no bony tenderness  Integumentary: large amount of bruising to face and right upper thigh   Neurologic: awake, alert, cranial nerves II-XII grossly intact, extremities’ motor and sensory functions grossly intact, no focal deficits, GCS 15

## 2023-05-15 LAB — MANUAL DIF COMMENT BLD-IMP: SIGNIFICANT CHANGE UP

## 2023-09-11 NOTE — ED ADULT TRIAGE NOTE - MEANS OF ARRIVAL
well developed, well nourished , in no acute distress , ambulating without difficulty , normal communication ability
ambulatory

## 2024-01-01 NOTE — ED ADULT NURSE NOTE - NSFALLLASTDATE_ED_ALL_ED_DT
[facility]   Intensive Care Note    Name: Alvin (Male-A Helen Marti)        MRN [id number]  Parents:  Helen and Yan  YOB: 2023 6:08 PM  Date of Admission: 2024  ____    History of Present Illness   Alvin was born full term, at 40w3d, weighing 7 lb 6 oz (3345 g) (AGA) by vaginal delivery at [facility]. He was brought to the [facility] due to feeding intolerance, and at 36 hours of life we were contacted by Dr. Kramer due to concern for intestinal obstruction. He had an uncomplicated transport to [facility] NICU for further evaluation and therapy.     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    Infant remains stable. Tolerating increase in feeds.         Assessment & Plan     Overall Status:    19 day old, term, male infant, now at 43w1d PMA. Developed feeding intolerance in first days of life, with imaging suggestive of Hirshprung's disease, + biopsy results    This patient, whose weight is < 5000 grams, is no longer critically ill, but requires cardiorespiratory monitoring, IV fluids to support nutrition/hydration due to history of poor stooling and feeding intolerance.    Vascular Access:  PIV out    FEN:    Vitals:    03/15/24 0000 03/15/24 2100 24 0149   Weight: 3.67 kg (8 lb 1.5 oz) 3.71 kg (8 lb 2.9 oz) 3.75 kg (8 lb 4.3 oz)     Growth: symmetric AGA  Mother planning to bottle feed    Intake/output:  ~ 140 mLs/kg/day, ~100 kcals/kg/day  UOP ~3 ml/kg/hr  SOP + (with irrigations and suppositories)     PO: 66% (decrease PO %  with TID rectal irrigations)    Plan:  - Feeds of Sim 360 on IDF for TF @ 160 ml/kg/day  - Vit D of 5 mcg/day   - Bowel regimen, as below.  - Dietician input; dietician to make assessment of malnutrition status at/after 2 weeks of age.     Hx of requiring KCl, discontinued 3/9.    GI:  Clinical concern for bowel obstruction by second day of life. 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  - Appreciate surgery consultation.   - Saline rectal irrigations back to Q8 hrs (on 3/17) after decreased PO on TID irrigations. Parents can complete q4 hr PRN irrigations at home.   - Will need to show ability to take all PO with weight gain with irrigations prior to discharge. May need earlier surgical intervention if unable to PO feed.   - Glycerin suppositories Q12.   - PRN abdominal x-rays.   - Follow-up with Surgery (Osmani) in 1-2 weeks (can be seen on Monday, 3/18) if discharge in next 24-48 hours.     Respiratory:   Intermittent tachypnea - now resolved. CXR 3/8 without concerns.      Current support: Room air.  - CR monitoring with oximetry.    Cardiovascular:    Good BP and perfusion. No murmur.  - Passed CCHD screen at OSH.   - CR monitoring.    ID:    Low concern for infection. Upon admission, had mild tachypnea, without other signs concerning for sepsis. Labs reassuring - all now resolved.   - Monitor for signs and symptoms of infection.   - Routine IP surveillance tests for MRSA.     Hematology:   Risk for anemia of prematurity/phlebotomy.    > Polycythemia, likely due to hemoconcentration in the setting of poor PO intake and emesis. Now improving.  - No additional iron needs due to enough in feeds.     Renal:   Renal function appropriate for age.   - Monitor UO closely.    Creatinine   Date Value Ref Range Status   2024 0.24 (L) 0.31 - 0.88 mg/dL Final   2024 0.23 (L) 0.31 - 0.88 mg/dL Final     BP Readings from Last 3 Encounters:   03/17/24 97/58        Jaundice:   Physiologic jaundice resolving. Never on phototx. Mother B+.   Direct hyperbili, improving. Thought attributable to bowel pathology.   - No additional checks unless clinical concerns.     Lab Results   Component Value Date    BILITOTAL 2024    BILITOTAL 2024    DBIL 2024    DBIL 0.60 (H) 2024        CNS:    Exam wnl.   - Standard NICU monitoring and assessment.  - Monitor clinical exam and weekly OFC measurements.      Sedation/ Pain Control:  - Nonpharmacologic comfort measures. Sweetease with painful procedures.      Psychosocial:  Appreciate social work involvement.  - PMAD screening: Recognizing increased risk for  mood and anxiety disorders in NICU parents, plan for routine screening for parents at 1, 2, 4, and 6 months if infant remains hospitalized.     HCM and Discharge Planning:  Screening tests indicated:  - [place] just prior to 24 hr - borderline amino acids  - sent repeat screen NBS on 3/14  - CCHD screen passed  - Hearing screen passed but will repeat after antibiotics/PTD.  - OT input.  - Continue standard NICU cares and family education plan.    Immunizations     Immunization History   Administered Date(s) Administered    Hepatitis B, Peds 2024          Medications   Current Facility-Administered Medications   Medication    Breast Milk label for barcode scanning 1 Bottle    cholecalciferol (D-VI-SOL, Vitamin D3) 10 mcg/mL (400 units/mL) liquid 5 mcg    glycerin (PEDI-LAX) Suppository 0.25 suppository    hepatitis B vaccine previously administered or declined    sodium chloride 0.9% (bottle) irrigation    sucrose (SWEET-EASE) solution 0.2-2 mL          Physical Exam   Temp: 98.8  F (37.1  C) Temp src: Axillary BP: 97/58 Pulse: 135   Resp: 76 SpO2: 98 %       GENERAL: Well developed, term appearing infant in open crib.   RESPIRATORY: Chest CTA, no retractions.   CV: RRR, no murmur, good perfusion throughout.    ABDOMEN: Full but soft, active bowel sounds. Non-tender.     CNS: Normal tone for GA. AFOF. MAEE. Cephalohematoma stable over left parietal scalp.          Communications   Parents:  Name Home Phone Work Phone Mobile Phone Relationship Lgl Grd   HELEN CELAYA [phone] Mother    CHEYENNE CELAYA [phone]   Parent       Family lives in [place]  Updated daily    PCPs:  Infant PCP: Kelsea Garza  Maternal OB PCP:   Information for the patient's mother:  KaiaHelen mosquera [[id number]]   [facility]     MFM: DOMINGO  Delivering Provider:   Dr. Grimes  Admission note routed to all.    Health Care Team:  Patient discussed with the care team.   A/P, imaging studies, laboratory data, medications and family situation reviewed.    Marcela Walls MD     06-May-2023 16:48

## 2024-06-04 ENCOUNTER — INPATIENT (INPATIENT)
Facility: HOSPITAL | Age: 89
LOS: 2 days | Discharge: HOME | DRG: 111 | End: 2024-06-07
Attending: INTERNAL MEDICINE | Admitting: INTERNAL MEDICINE
Payer: MEDICAID

## 2024-06-04 VITALS
HEART RATE: 83 BPM | RESPIRATION RATE: 18 BRPM | HEIGHT: 66 IN | TEMPERATURE: 97 F | DIASTOLIC BLOOD PRESSURE: 61 MMHG | SYSTOLIC BLOOD PRESSURE: 171 MMHG | WEIGHT: 110.01 LBS | OXYGEN SATURATION: 99 %

## 2024-06-04 DIAGNOSIS — Z96.649 PRESENCE OF UNSPECIFIED ARTIFICIAL HIP JOINT: Chronic | ICD-10-CM

## 2024-06-04 DIAGNOSIS — R26.81 UNSTEADINESS ON FEET: ICD-10-CM

## 2024-06-04 LAB
ALBUMIN SERPL ELPH-MCNC: 3.4 G/DL — LOW (ref 3.5–5.2)
ALP SERPL-CCNC: 135 U/L — HIGH (ref 30–115)
ALT FLD-CCNC: 33 U/L — SIGNIFICANT CHANGE UP (ref 0–41)
ANION GAP SERPL CALC-SCNC: 9 MMOL/L — SIGNIFICANT CHANGE UP (ref 7–14)
AST SERPL-CCNC: 34 U/L — SIGNIFICANT CHANGE UP (ref 0–41)
BASE EXCESS BLDV CALC-SCNC: 1.7 MMOL/L — SIGNIFICANT CHANGE UP (ref -2–3)
BASOPHILS # BLD AUTO: 0.09 K/UL — SIGNIFICANT CHANGE UP (ref 0–0.2)
BASOPHILS NFR BLD AUTO: 0.3 % — SIGNIFICANT CHANGE UP (ref 0–1)
BILIRUB SERPL-MCNC: <0.2 MG/DL — SIGNIFICANT CHANGE UP (ref 0.2–1.2)
BUN SERPL-MCNC: 23 MG/DL — HIGH (ref 10–20)
CA-I SERPL-SCNC: 1.23 MMOL/L — SIGNIFICANT CHANGE UP (ref 1.15–1.33)
CALCIUM SERPL-MCNC: 8.9 MG/DL — SIGNIFICANT CHANGE UP (ref 8.4–10.5)
CHLORIDE SERPL-SCNC: 96 MMOL/L — LOW (ref 98–110)
CO2 SERPL-SCNC: 24 MMOL/L — SIGNIFICANT CHANGE UP (ref 17–32)
CREAT SERPL-MCNC: <0.5 MG/DL — LOW (ref 0.7–1.5)
DACRYOCYTES BLD QL SMEAR: SLIGHT — SIGNIFICANT CHANGE UP
EGFR: 96 ML/MIN/1.73M2 — SIGNIFICANT CHANGE UP
EOSINOPHIL # BLD AUTO: 0.08 K/UL — SIGNIFICANT CHANGE UP (ref 0–0.7)
EOSINOPHIL NFR BLD AUTO: 0.2 % — SIGNIFICANT CHANGE UP (ref 0–8)
GAS PNL BLDV: 129 MMOL/L — LOW (ref 136–145)
GAS PNL BLDV: SIGNIFICANT CHANGE UP
GAS PNL BLDV: SIGNIFICANT CHANGE UP
GLUCOSE SERPL-MCNC: 296 MG/DL — HIGH (ref 70–99)
HCO3 BLDV-SCNC: 27 MMOL/L — SIGNIFICANT CHANGE UP (ref 22–29)
HCT VFR BLD CALC: 38.8 % — SIGNIFICANT CHANGE UP (ref 37–47)
HCT VFR BLDA CALC: 40 % — SIGNIFICANT CHANGE UP (ref 34.5–46.5)
HGB BLD CALC-MCNC: 13.4 G/DL — SIGNIFICANT CHANGE UP (ref 11.7–16.1)
HGB BLD-MCNC: 12.9 G/DL — SIGNIFICANT CHANGE UP (ref 12–16)
IMM GRANULOCYTES NFR BLD AUTO: 0.1 % — SIGNIFICANT CHANGE UP (ref 0.1–0.3)
LACTATE BLDV-MCNC: 1.5 MMOL/L — SIGNIFICANT CHANGE UP (ref 0.5–2)
LYMPHOCYTES # BLD AUTO: 30.31 K/UL — HIGH (ref 1.2–3.4)
LYMPHOCYTES # BLD AUTO: 87.3 % — HIGH (ref 20.5–51.1)
MAGNESIUM SERPL-MCNC: 2 MG/DL — SIGNIFICANT CHANGE UP (ref 1.8–2.4)
MCHC RBC-ENTMCNC: 27.7 PG — SIGNIFICANT CHANGE UP (ref 27–31)
MCHC RBC-ENTMCNC: 33.2 G/DL — SIGNIFICANT CHANGE UP (ref 32–37)
MCV RBC AUTO: 83.3 FL — SIGNIFICANT CHANGE UP (ref 81–99)
MONOCYTES # BLD AUTO: 1.54 K/UL — HIGH (ref 0.1–0.6)
MONOCYTES NFR BLD AUTO: 4.4 % — SIGNIFICANT CHANGE UP (ref 1.7–9.3)
NEUTROPHILS # BLD AUTO: 2.66 K/UL — SIGNIFICANT CHANGE UP (ref 1.4–6.5)
NEUTROPHILS NFR BLD AUTO: 7.7 % — LOW (ref 42.2–75.2)
NRBC # BLD: 0 /100 WBCS — SIGNIFICANT CHANGE UP (ref 0–0)
NRBC # BLD: 0 /100 WBCS — SIGNIFICANT CHANGE UP (ref 0–0)
OVALOCYTES BLD QL SMEAR: SLIGHT — SIGNIFICANT CHANGE UP
PCO2 BLDV: 42 MMHG — SIGNIFICANT CHANGE UP (ref 39–42)
PH BLDV: 7.41 — SIGNIFICANT CHANGE UP (ref 7.32–7.43)
PHOSPHATE SERPL-MCNC: 3.7 MG/DL — SIGNIFICANT CHANGE UP (ref 2.1–4.9)
PLAT MORPH BLD: NORMAL — SIGNIFICANT CHANGE UP
PLATELET # BLD AUTO: 145 K/UL — SIGNIFICANT CHANGE UP (ref 130–400)
PLATELET COUNT - ESTIMATE: NORMAL — SIGNIFICANT CHANGE UP
PMV BLD: 8.8 FL — SIGNIFICANT CHANGE UP (ref 7.4–10.4)
PO2 BLDV: 47 MMHG — HIGH (ref 25–45)
POTASSIUM BLDV-SCNC: 4.1 MMOL/L — SIGNIFICANT CHANGE UP (ref 3.5–5.1)
POTASSIUM SERPL-MCNC: 4.3 MMOL/L — SIGNIFICANT CHANGE UP (ref 3.5–5)
POTASSIUM SERPL-SCNC: 4.3 MMOL/L — SIGNIFICANT CHANGE UP (ref 3.5–5)
PROT SERPL-MCNC: 6.8 G/DL — SIGNIFICANT CHANGE UP (ref 6–8)
RBC # BLD: 4.66 M/UL — SIGNIFICANT CHANGE UP (ref 4.2–5.4)
RBC # FLD: 14.9 % — HIGH (ref 11.5–14.5)
RBC BLD AUTO: NORMAL — SIGNIFICANT CHANGE UP
SAO2 % BLDV: 76.4 % — SIGNIFICANT CHANGE UP (ref 67–88)
SODIUM SERPL-SCNC: 129 MMOL/L — LOW (ref 135–146)
TROPONIN T, HIGH SENSITIVITY RESULT: 12 NG/L — SIGNIFICANT CHANGE UP (ref 6–13)
WBC # BLD: 34.73 K/UL — HIGH (ref 4.8–10.8)
WBC # FLD AUTO: 34.73 K/UL — HIGH (ref 4.8–10.8)

## 2024-06-04 PROCEDURE — 93010 ELECTROCARDIOGRAM REPORT: CPT

## 2024-06-04 PROCEDURE — 85027 COMPLETE CBC AUTOMATED: CPT

## 2024-06-04 PROCEDURE — 72148 MRI LUMBAR SPINE W/O DYE: CPT | Mod: MC

## 2024-06-04 PROCEDURE — 84100 ASSAY OF PHOSPHORUS: CPT

## 2024-06-04 PROCEDURE — 81001 URINALYSIS AUTO W/SCOPE: CPT

## 2024-06-04 PROCEDURE — 70496 CT ANGIOGRAPHY HEAD: CPT | Mod: 26,MC

## 2024-06-04 PROCEDURE — 83036 HEMOGLOBIN GLYCOSYLATED A1C: CPT

## 2024-06-04 PROCEDURE — 83735 ASSAY OF MAGNESIUM: CPT

## 2024-06-04 PROCEDURE — 82962 GLUCOSE BLOOD TEST: CPT

## 2024-06-04 PROCEDURE — 70498 CT ANGIOGRAPHY NECK: CPT | Mod: 26,MC

## 2024-06-04 PROCEDURE — 97116 GAIT TRAINING THERAPY: CPT | Mod: GP

## 2024-06-04 PROCEDURE — 70450 CT HEAD/BRAIN W/O DYE: CPT | Mod: 26,MC

## 2024-06-04 PROCEDURE — 97162 PT EVAL MOD COMPLEX 30 MIN: CPT | Mod: GP

## 2024-06-04 PROCEDURE — 99222 1ST HOSP IP/OBS MODERATE 55: CPT

## 2024-06-04 PROCEDURE — 97110 THERAPEUTIC EXERCISES: CPT | Mod: GP

## 2024-06-04 PROCEDURE — 97165 OT EVAL LOW COMPLEX 30 MIN: CPT | Mod: GO

## 2024-06-04 PROCEDURE — 80053 COMPREHEN METABOLIC PANEL: CPT

## 2024-06-04 PROCEDURE — 85025 COMPLETE CBC W/AUTO DIFF WBC: CPT

## 2024-06-04 PROCEDURE — 99285 EMERGENCY DEPT VISIT HI MDM: CPT

## 2024-06-04 PROCEDURE — G0378: CPT

## 2024-06-04 PROCEDURE — 71045 X-RAY EXAM CHEST 1 VIEW: CPT | Mod: 26

## 2024-06-04 PROCEDURE — 36415 COLL VENOUS BLD VENIPUNCTURE: CPT

## 2024-06-04 PROCEDURE — 80048 BASIC METABOLIC PNL TOTAL CA: CPT

## 2024-06-04 RX ORDER — HEPARIN SODIUM 5000 [USP'U]/ML
5000 INJECTION INTRAVENOUS; SUBCUTANEOUS EVERY 8 HOURS
Refills: 0 | Status: DISCONTINUED | OUTPATIENT
Start: 2024-06-04 | End: 2024-06-07

## 2024-06-04 RX ORDER — MECLIZINE HCL 12.5 MG
25 TABLET ORAL ONCE
Refills: 0 | Status: COMPLETED | OUTPATIENT
Start: 2024-06-04 | End: 2024-06-04

## 2024-06-04 RX ORDER — SODIUM CHLORIDE 9 MG/ML
1000 INJECTION, SOLUTION INTRAVENOUS ONCE
Refills: 0 | Status: COMPLETED | OUTPATIENT
Start: 2024-06-04 | End: 2024-06-04

## 2024-06-04 RX ORDER — SODIUM CHLORIDE 9 MG/ML
1000 INJECTION INTRAMUSCULAR; INTRAVENOUS; SUBCUTANEOUS
Refills: 0 | Status: DISCONTINUED | OUTPATIENT
Start: 2024-06-04 | End: 2024-06-07

## 2024-06-04 RX ORDER — ACETAMINOPHEN 500 MG
975 TABLET ORAL ONCE
Refills: 0 | Status: COMPLETED | OUTPATIENT
Start: 2024-06-04 | End: 2024-06-04

## 2024-06-04 RX ORDER — CHLORHEXIDINE GLUCONATE 213 G/1000ML
1 SOLUTION TOPICAL
Refills: 0 | Status: DISCONTINUED | OUTPATIENT
Start: 2024-06-04 | End: 2024-06-07

## 2024-06-04 RX ADMIN — Medication 25 MILLIGRAM(S): at 18:27

## 2024-06-04 RX ADMIN — SODIUM CHLORIDE 2000 MILLILITER(S): 9 INJECTION, SOLUTION INTRAVENOUS at 18:27

## 2024-06-04 RX ADMIN — Medication 975 MILLIGRAM(S): at 18:27

## 2024-06-04 NOTE — ED ADULT NURSE NOTE - PAIN: PRESENCE, MLM
Pt resting in bed comfortably at this time, unable to assess mental status. No distress noted, respirations even and unlabored on room air. NS infusing at 50. Pt denies pain at this time. Pt instructed to call for assistance if needed, call light in place, will continue to monitor. Will prepare for bedside shift report. complains of pain/discomfort

## 2024-06-04 NOTE — H&P ADULT - NSHPLABSRESULTS_GEN_ALL_CORE
LABS:                        12.9   34.73 )-----------( 145      ( 04 Jun 2024 17:25 )             38.8     06-04    129<L>  |  96<L>  |  23<H>  ----------------------------<  296<H>  4.3   |  24  |  <0.5<L>    Ca    8.9      04 Jun 2024 17:25  Phos  3.7     06-04  Mg     2.0     06-04    TPro  6.8  /  Alb  3.4<L>  /  TBili  <0.2  /  DBili  x   /  AST  34  /  ALT  33  /  AlkPhos  135<H>  06-04    LIVER FUNCTIONS - ( 04 Jun 2024 17:25 )  Alb: 3.4 g/dL / Pro: 6.8 g/dL / ALK PHOS: 135 U/L / ALT: 33 U/L / AST: 34 U/L / GGT: x             Urinalysis Basic - ( 04 Jun 2024 17:25 )    Color: x / Appearance: x / SG: x / pH: x  Gluc: 296 mg/dL / Ketone: x  / Bili: x / Urobili: x   Blood: x / Protein: x / Nitrite: x   Leuk Esterase: x / RBC: x / WBC x   Sq Epi: x / Non Sq Epi: x / Bacteria: x  ++++++++++++++++++++++++++++++++++++++++++++++++++++++++++++++++++++++++++++++++++++++++++++++  < from: CT Angio Neck w/ IV Cont (06.04.24 @ 18:54) >    IMPRESSION:    No evidence of major vascular stenosis, occlusion, or aneurysm.    --- End of Report ---    ANGELIC PANDA MD; Attending Interventional Radiologist  This document has been electronically signed. Jun 4 2024  7:53PM    < end of copied text >

## 2024-06-04 NOTE — H&P ADULT - HISTORY OF PRESENT ILLNESS
Pt is a 89 y/o female with a PMHx of HTN, DM, and vertigo, uses walker and cane to ambulate, presenting to ED for dizziness x 3 days. Pt is [place] peaking, HPI provided majority by daughter, who is at bedside. Patient endorses intermittent episodes of  dizziness x 2 years, however worsening episodes since sunday with worsening balance upon standing. Yesterday, pt states she got dizzy and fell to the ground, striking the back of her head. however maria teresa LOC, not on a/c. Pt endorses today, developed a tension-like HA & dizziness. Pt denies trying any medications for relief. Patient denies fever, chills, nausea, vomiting, CP, SOB, slurred speech, facial weakness, neck pain/stiffness, abdominal pain, dysuria, hematuria, numbness, tingling, back pain, or any additional complaints.

## 2024-06-04 NOTE — ED PROVIDER NOTE - OBJECTIVE STATEMENT
Patient is an 88 year old female with PMH of HTN, DM, and Vertigo, uses a cane and walker to ambulate, presenting for dizziness. Patient endorses intermittent episodes of vertigo for the past two years; acutely worsening for the past few weeks and associated with worsening balance when standing. Yesterday, patient states she got dizzy and fell to the ground, striking the back of her on the ground; no LOC, not on a/c. Today patient endorses a gradual onset of tension-like headache and dizziness. Patient denies neck pain/stiffness, nausea, vomiting, chest pain, shortness of breath, abdominal pain, urinary symptoms, back pain, leg pain/stiffness, or additional complaints.

## 2024-06-04 NOTE — H&P ADULT - NS ATTEND AMEND GEN_ALL_CORE FT
seen while in he ER, daughter at bedside. Agree with above seen while in he ER, daughter at bedside. Agree with above (including WBC count of 34.73 and sodium level of 129)

## 2024-06-04 NOTE — ED PROVIDER NOTE - CLINICAL SUMMARY MEDICAL DECISION MAKING FREE TEXT BOX
88-year-old female presents to the ED for difficulty ambulation.  Has been worsening over the past weeks.  Noted to be symptomatic this way for the past 6 months.  Physical exam with antalgic gait.  Remainder the physical exam is unremarkable.  Concern for CVA versus TIA.  We obtained labs along with CT imaging.  Labs revealed leukocytosis of 34.  As per daughter at bedside.  Patient is noted to have prior leukocytosis.  Hyponatremia.  Hyperglycemia.

## 2024-06-04 NOTE — H&P ADULT - NSHPSOCIALHISTORY_GEN_ALL_CORE
Substance Use (street drugs): ( x ) never used  (  ) other:  Tobacco Usage:  ( x  ) never smoked   (   ) former smoker   (   ) current smoker  (     ) pack year  Alcohol Usage: None Substance Use (street drugs): ( x ) never used  (  ) other:  Tobacco Usage:  ( x  ) never smoked  Alcohol Usage: None

## 2024-06-04 NOTE — ED PROVIDER NOTE - STUDIES
[facility]
EKG - see results section for interpretation/Xray Image(s) - see wet read section for interpretation

## 2024-06-04 NOTE — H&P ADULT - ASSESSMENT
Pt is a 89 y/o female with a PMHx of HTN, DM, and vertigo, uses walker and cane to ambulate, presenting to ED for dizziness x 3 days.       #Dizziness/imbalance/HA  - Admit to med/surg  - CT head no cont: No CT evidence of acute pathology.   - CT angio head/ neck w/ IV: No evidence of major vascular stenosis, occlusion, or aneurysm.  - s/p LR bolus - given in ED  - Meclizine  - Electrolyte monitoring  - Fall risk protocol  - VS monitoring  - Pain control PRN      #HTN  - c/w home meds  - Monitor BP  - DASH diet      #DM  - ISS  - Monitor FS  - CC diet      DVT ppx: Heparin  GI ppx: PPI    Above plan and meds discussed with Dr. Berry Assessment:  Pt is a 87 y/o female with a PMHx of HTN, DM, and vertigo, uses walker and cane to ambulate, presenting to ED for dizziness x 3 days.       Plan:    #Dizziness/imbalance/HA  - Admit to inpatient of care: med/surg  - CT head no cont: No CT evidence of acute pathology.   - CT angio head/ neck w/ IV: No evidence of major vascular stenosis, occlusion, or aneurysm.  - s/p LR bolus - given in ED  - Meclizine  - Electrolyte monitoring  - Fall risk protocol  - VS monitoring  - Pain control PRN      #HTN  - c/w home meds  - Monitor BP  - DASH diet      #DM  - ISS  - Monitor FS  - CC diet      DVT ppx: Heparin  GI ppx: PPI    Above plan and meds discussed with Dr. Berry Assessment:  Pt is a 87 y/o female with a PMHx of HTN, DM, and vertigo, uses walker and cane to ambulate, presenting to ED for dizziness x 3 days.       Plan:    #Dizziness/imbalance/HA most likely secondary to vertigo  - Admit to inpatient of care: med/surg  - CT head no cont: No CT evidence of acute pathology.   - CT angio head/ neck w/ IV: No evidence of major vascular stenosis, occlusion, or aneurysm.  - s/p LR bolus - given in ED  - Continue with IV fluids  - Meclizine  - Electrolyte monitoring  - Fall risk protocol  - VS monitoring  - Pain control PRN  - Neurology consult placed- follow up recommendations.    #HTN  - c/w home meds  - Monitor BP  - DASH diet      #DM  - ISS  - Monitor FS  - CC diet      DVT ppx: Heparin  GI ppx: PPI  Activity: ambulate with assistance.  CHG ordered.  Diet: DASH/CC       Above plan and meds discussed with Dr. Berry Assessment:  Pt is a 87 y/o female with a PMHx of HTN, DM, and vertigo, uses walker and cane to ambulate, presenting to ED for dizziness x 3 days.       Plan:    #Dizziness/imbalance/HA most likely secondary to vertigo  - Admit to inpatient of care: med/surg  - CT head no cont: No CT evidence of acute pathology.   - CT angio head/ neck w/ IV: No evidence of major vascular stenosis, occlusion, or aneurysm.  - s/p LR bolus - given in ED  - Continue with IV fluids  - Meclizine  - Electrolyte monitoring  - Fall risk protocol  - VS monitoring  - Pain control PRN  - Neurology consult placed- follow up recommendations.    #Leukocytosis   - As per daughter, patient has chronically elevated WBC.  - Patient denies recent fever, n/v/abdominal pain.   - Continue to monitor for infectious etiology.    #Hyponatremia   - Continue with IV fluids  - Follow up BMP in AM.   - If continues to downtrend, consider urine studies and nephrology consult.       #HTN  - c/w home meds  - Monitor BP  - DASH diet      #DM  - ISS  - Monitor FS  - CC diet      DVT ppx: Heparin  GI ppx: PPI  Activity: ambulate with assistance.  CHG ordered.  Diet: DASH/CC       Above plan and meds discussed with Dr. Berry

## 2024-06-04 NOTE — ED PROVIDER NOTE - CARE PLAN
1 Principal Discharge DX:	Gait instability  Secondary Diagnosis:	Weakness  Secondary Diagnosis:	Hyponatremia  Secondary Diagnosis:	Leukocytosis

## 2024-06-04 NOTE — PATIENT PROFILE ADULT - FALL HARM RISK - HARM RISK INTERVENTIONS
Assistance with ambulation/Assistance OOB with selected safe patient handling equipment/Communicate Risk of Fall with Harm to all staff/Discuss with provider need for PT consult/Monitor gait and stability/Provide patient with walking aids - walker, cane, crutches/Reinforce activity limits and safety measures with patient and family/Sit up slowly, dangle for a short time, stand at bedside before walking/Tailored Fall Risk Interventions/Visual Cue: Yellow wristband and red socks/Bed in lowest position, wheels locked, appropriate side rails in place/Call bell, personal items and telephone in reach/Instruct patient to call for assistance before getting out of bed or chair/Non-slip footwear when patient is out of bed/[place] to call system/Physically safe environment - no spills, clutter or unnecessary equipment/Purposeful Proactive Rounding/Room/bathroom lighting operational, light cord in reach

## 2024-06-04 NOTE — ED PROVIDER NOTE - EKG/XRAY ADDITIONAL INFORMATION
Independent interpretation of the ED Chest X-Ray film(s) performed by Vasile Chase are negative for ptx, infiltrates, or effusions.    Independent interpretation of the EKG performed by Vaisle Chase: Sinus, HR: 70, KS: 140, QTc: 414, ST-T: no ST-T wave changes

## 2024-06-04 NOTE — H&P ADULT - NSHPPHYSICALEXAM_GEN_ALL_CORE
Vital Signs Last 24 Hrs  T(C): 36.4 (04 Jun 2024 21:29), Max: 36.4 (04 Jun 2024 21:29)  T(F): 97.5 (04 Jun 2024 21:29), Max: 97.5 (04 Jun 2024 21:29)  HR: 64 (04 Jun 2024 21:29) (64 - 83)  BP: 145/83 (04 Jun 2024 21:29) (145/83 - 171/61)  RR: 18 (04 Jun 2024 21:29) (18 - 18)  SpO2: 97% (04 Jun 2024 21:29) (97% - 99%)    Parameters below as of 04 Jun 2024 21:29  Patient On (Oxygen Delivery Method): room air    VITALS:     GENERAL: NAD, lying in bed comfortably  HEAD:  Atraumatic, Normocephalic  EYES: EOMI, PERRLA, conjunctiva and sclera clear  ENT: Moist mucous membranes  NECK: Supple, No JVD  CHEST/LUNG: Clear to auscultation bilaterally; No rales, rhonchi, wheezing, or rubs. Unlabored respirations  HEART: Regular rate and rhythm; No murmurs, rubs, or gallops  ABDOMEN: Bowel sounds present; Soft, Nontender, Nondistended. No hepatomegally  EXTREMITIES:  2+ Peripheral Pulses, brisk capillary refill. No clubbing, cyanosis, or edema  NERVOUS SYSTEM:  Alert & Oriented X3, speech clear. No deficits   MSK: FROM all 4 extremities, full and equal strength  SKIN: No rashes or lesions Vital Signs Last 24 Hrs  T(C): 36.4 (04 Jun 2024 21:29), Max: 36.4 (04 Jun 2024 21:29)  T(F): 97.5 (04 Jun 2024 21:29), Max: 97.5 (04 Jun 2024 21:29)  HR: 64 (04 Jun 2024 21:29) (64 - 83)  BP: 145/83 (04 Jun 2024 21:29) (145/83 - 171/61)  RR: 18 (04 Jun 2024 21:29) (18 - 18)  SpO2: 97% (04 Jun 2024 21:29) (97% - 99%)    Parameters below as of 04 Jun 2024 21:29  Patient On (Oxygen Delivery Method): room air    VITALS:     GENERAL: NAD, lying in bed comfortably  HEAD:  Atraumatic, Normocephalic  EYES: EOMI, PERRLA, conjunctiva and sclera clear. NO nystagmus noted.   ENT: Moist mucous membranes  NECK: Supple, Full ROM. No c-spine tenderness. No cervical LAD.   CHEST/LUNG: CTABL. No rales, rhonchi, wheezing, or rubs. Unlabored respirations  HEART: Regular rate and rhythm; Normal S1, S2.   ABDOMEN: Bowel sounds present; Soft, Nontender, Nondistended.   EXTREMITIES: Full ROM, no pedal edema, no calf tenderness.  NERVOUS SYSTEM:  Alert & Oriented X3, speech clear. Unsteady gait.   MSK: FROM all 4 extremities, full and equal strength  SKIN: Warm dry, normal skin turgor.

## 2024-06-04 NOTE — ED PROVIDER NOTE - PHYSICAL EXAMINATION
VITAL SIGNS: I have reviewed nursing notes and confirm.  CONSTITUTIONAL: Well-appearing, non-toxic, in NAD  SKIN: Warm dry, normal skin turgor  HEAD: NCAT  EYES: No conjunctival injection, scleral anicteric, PERRLA, EOMI, no nystagmus noted  ENT: Moist mucous membranes, normal pharynx with no erythema or exudates  NECK: Supple; full ROM. Nontender. No cervical LAD  CARD: RRR, no murmurs, rubs or gallops  RESP: Clear to ausculation bilaterally.  No rales, rhonchi, or wheezing.  ABD: Soft, non-distended, non-tender, no rebound or guarding. No CVA tenderness  EXT: Full ROM, no bony tenderness, no pedal edema, no calf tenderness  NEURO: Normal motor, normal sensory. CN II-XII grossly intact. Cerebellar testing normal. unsteady gait; +Romberg sign   PSYCH: Cooperative, appropriate.

## 2024-06-04 NOTE — PATIENT PROFILE ADULT - FUNCTIONAL SCREEN CURRENT LEVEL: COMMUNICATION, MLM
PRINCIPAL DISCHARGE DIAGNOSIS  Diagnosis: Chest pain  Assessment and Plan of Treatment: Likely due to missed HD. Please continue hemodialysis three times a week. Follow up with your PCP within two weeks of discharge.      SECONDARY DISCHARGE DIAGNOSES  Diagnosis: Diarrhea  Assessment and Plan of Treatment: Please take Protonix , Bentyl and cholestyramine as ordered which should help with stomach upset and diarrhea.    Diagnosis: Ventricular bigeminy  Assessment and Plan of Treatment: You likely will need an ablation for your abnormal heart beats. You have an appointment with Dr. Farias on 12/30/2019 at 12pm.
0 = understands/communicates without difficulty

## 2024-06-04 NOTE — ED ADULT NURSE NOTE - NSFALLHARMRISKINTERV_ED_ALL_ED
Assistance OOB with selected safe patient handling equipment if applicable/Assistance with ambulation/Communicate risk of Fall with Harm to all staff, patient, and family/Encourage patient to sit up slowly, dangle for a short time, stand at bedside before walking/Monitor gait and stability/Orthostatic vital signs/Provide visual cue: red socks, yellow wristband, yellow gown, etc/Reinforce activity limits and safety measures with patient and family/Bed in lowest position, wheels locked, appropriate side rails in place/Call bell, personal items and telephone in reach/Instruct patient to call for assistance before getting out of bed/chair/stretcher/Non-slip footwear applied when patient is off stretcher/[place] to call system/Physically safe environment - no spills, clutter or unnecessary equipment/Purposeful Proactive Rounding/Room/bathroom lighting operational, light cord in reach

## 2024-06-05 LAB
A1C WITH ESTIMATED AVERAGE GLUCOSE RESULT: 8.6 % — HIGH (ref 4–5.6)
ANION GAP SERPL CALC-SCNC: 10 MMOL/L — SIGNIFICANT CHANGE UP (ref 7–14)
APPEARANCE UR: CLEAR — SIGNIFICANT CHANGE UP
BACTERIA # UR AUTO: ABNORMAL /HPF
BILIRUB UR-MCNC: NEGATIVE — SIGNIFICANT CHANGE UP
BUN SERPL-MCNC: 18 MG/DL — SIGNIFICANT CHANGE UP (ref 10–20)
CALCIUM SERPL-MCNC: 8.3 MG/DL — LOW (ref 8.4–10.5)
CHLORIDE SERPL-SCNC: 100 MMOL/L — SIGNIFICANT CHANGE UP (ref 98–110)
CO2 SERPL-SCNC: 24 MMOL/L — SIGNIFICANT CHANGE UP (ref 17–32)
COLOR SPEC: YELLOW — SIGNIFICANT CHANGE UP
CREAT SERPL-MCNC: <0.5 MG/DL — LOW (ref 0.7–1.5)
DIFF PNL FLD: NEGATIVE — SIGNIFICANT CHANGE UP
EGFR: 96 ML/MIN/1.73M2 — SIGNIFICANT CHANGE UP
EPI CELLS # UR: PRESENT
ESTIMATED AVERAGE GLUCOSE: 200 MG/DL — HIGH (ref 68–114)
GLUCOSE BLDC GLUCOMTR-MCNC: 159 MG/DL — HIGH (ref 70–99)
GLUCOSE BLDC GLUCOMTR-MCNC: 220 MG/DL — HIGH (ref 70–99)
GLUCOSE BLDC GLUCOMTR-MCNC: 229 MG/DL — HIGH (ref 70–99)
GLUCOSE BLDC GLUCOMTR-MCNC: 261 MG/DL — HIGH (ref 70–99)
GLUCOSE SERPL-MCNC: 168 MG/DL — HIGH (ref 70–99)
GLUCOSE UR QL: >=1000 MG/DL
HCT VFR BLD CALC: 38.5 % — SIGNIFICANT CHANGE UP (ref 37–47)
HGB BLD-MCNC: 12.5 G/DL — SIGNIFICANT CHANGE UP (ref 12–16)
KETONES UR-MCNC: NEGATIVE MG/DL — SIGNIFICANT CHANGE UP
LEUKOCYTE ESTERASE UR-ACNC: NEGATIVE — SIGNIFICANT CHANGE UP
MAGNESIUM SERPL-MCNC: 1.8 MG/DL — SIGNIFICANT CHANGE UP (ref 1.8–2.4)
MCHC RBC-ENTMCNC: 27.2 PG — SIGNIFICANT CHANGE UP (ref 27–31)
MCHC RBC-ENTMCNC: 32.5 G/DL — SIGNIFICANT CHANGE UP (ref 32–37)
MCV RBC AUTO: 83.9 FL — SIGNIFICANT CHANGE UP (ref 81–99)
NITRITE UR-MCNC: NEGATIVE — SIGNIFICANT CHANGE UP
NRBC # BLD: 0 /100 WBCS — SIGNIFICANT CHANGE UP (ref 0–0)
PH UR: 7 — SIGNIFICANT CHANGE UP (ref 5–8)
PHOSPHATE SERPL-MCNC: 3.2 MG/DL — SIGNIFICANT CHANGE UP (ref 2.1–4.9)
PLATELET # BLD AUTO: 134 K/UL — SIGNIFICANT CHANGE UP (ref 130–400)
PMV BLD: 8.9 FL — SIGNIFICANT CHANGE UP (ref 7.4–10.4)
POTASSIUM SERPL-MCNC: 4.1 MMOL/L — SIGNIFICANT CHANGE UP (ref 3.5–5)
POTASSIUM SERPL-SCNC: 4.1 MMOL/L — SIGNIFICANT CHANGE UP (ref 3.5–5)
PROT UR-MCNC: 30 MG/DL
RBC # BLD: 4.59 M/UL — SIGNIFICANT CHANGE UP (ref 4.2–5.4)
RBC # FLD: 15.1 % — HIGH (ref 11.5–14.5)
RBC CASTS # UR COMP ASSIST: 2 /HPF — SIGNIFICANT CHANGE UP (ref 0–4)
SODIUM SERPL-SCNC: 134 MMOL/L — LOW (ref 135–146)
SP GR SPEC: >1.03 — HIGH (ref 1–1.03)
SQUAMOUS # UR AUTO: 2 /HPF — SIGNIFICANT CHANGE UP (ref 0–5)
UROBILINOGEN FLD QL: 1 MG/DL — SIGNIFICANT CHANGE UP (ref 0.2–1)
WBC # BLD: 30.9 K/UL — HIGH (ref 4.8–10.8)
WBC # FLD AUTO: 30.9 K/UL — HIGH (ref 4.8–10.8)
WBC UR QL: 2 /HPF — SIGNIFICANT CHANGE UP (ref 0–5)

## 2024-06-05 PROCEDURE — 99222 1ST HOSP IP/OBS MODERATE 55: CPT

## 2024-06-05 PROCEDURE — 99233 SBSQ HOSP IP/OBS HIGH 50: CPT

## 2024-06-05 RX ORDER — GLUCAGON INJECTION, SOLUTION 0.5 MG/.1ML
1 INJECTION, SOLUTION SUBCUTANEOUS ONCE
Refills: 0 | Status: DISCONTINUED | OUTPATIENT
Start: 2024-06-05 | End: 2024-06-07

## 2024-06-05 RX ORDER — SODIUM CHLORIDE 9 MG/ML
1000 INJECTION, SOLUTION INTRAVENOUS
Refills: 0 | Status: DISCONTINUED | OUTPATIENT
Start: 2024-06-05 | End: 2024-06-07

## 2024-06-05 RX ORDER — DEXTROSE 50 % IN WATER 50 %
25 SYRINGE (ML) INTRAVENOUS ONCE
Refills: 0 | Status: DISCONTINUED | OUTPATIENT
Start: 2024-06-05 | End: 2024-06-07

## 2024-06-05 RX ORDER — ATORVASTATIN CALCIUM 80 MG/1
20 TABLET, FILM COATED ORAL AT BEDTIME
Refills: 0 | Status: DISCONTINUED | OUTPATIENT
Start: 2024-06-05 | End: 2024-06-07

## 2024-06-05 RX ORDER — LOSARTAN POTASSIUM 100 MG/1
0 TABLET, FILM COATED ORAL
Qty: 0 | Refills: 0 | DISCHARGE

## 2024-06-05 RX ORDER — LOSARTAN POTASSIUM 100 MG/1
50 TABLET, FILM COATED ORAL DAILY
Refills: 0 | Status: DISCONTINUED | OUTPATIENT
Start: 2024-06-05 | End: 2024-06-07

## 2024-06-05 RX ORDER — DEXTROSE 50 % IN WATER 50 %
15 SYRINGE (ML) INTRAVENOUS ONCE
Refills: 0 | Status: DISCONTINUED | OUTPATIENT
Start: 2024-06-05 | End: 2024-06-07

## 2024-06-05 RX ORDER — ACETAMINOPHEN 500 MG
650 TABLET ORAL EVERY 6 HOURS
Refills: 0 | Status: DISCONTINUED | OUTPATIENT
Start: 2024-06-05 | End: 2024-06-07

## 2024-06-05 RX ORDER — DEXTROSE 10 % IN WATER 10 %
125 INTRAVENOUS SOLUTION INTRAVENOUS ONCE
Refills: 0 | Status: DISCONTINUED | OUTPATIENT
Start: 2024-06-05 | End: 2024-06-07

## 2024-06-05 RX ORDER — DEXTROSE 50 % IN WATER 50 %
12.5 SYRINGE (ML) INTRAVENOUS ONCE
Refills: 0 | Status: DISCONTINUED | OUTPATIENT
Start: 2024-06-05 | End: 2024-06-07

## 2024-06-05 RX ORDER — INSULIN LISPRO 100/ML
VIAL (ML) SUBCUTANEOUS
Refills: 0 | Status: DISCONTINUED | OUTPATIENT
Start: 2024-06-05 | End: 2024-06-07

## 2024-06-05 RX ORDER — CARVEDILOL PHOSPHATE 80 MG/1
6.25 CAPSULE, EXTENDED RELEASE ORAL EVERY 12 HOURS
Refills: 0 | Status: DISCONTINUED | OUTPATIENT
Start: 2024-06-05 | End: 2024-06-07

## 2024-06-05 RX ORDER — NIFEDIPINE 30 MG
30 TABLET, EXTENDED RELEASE 24 HR ORAL DAILY
Refills: 0 | Status: DISCONTINUED | OUTPATIENT
Start: 2024-06-05 | End: 2024-06-07

## 2024-06-05 RX ADMIN — Medication 650 MILLIGRAM(S): at 12:50

## 2024-06-05 RX ADMIN — LOSARTAN POTASSIUM 50 MILLIGRAM(S): 100 TABLET, FILM COATED ORAL at 06:53

## 2024-06-05 RX ADMIN — Medication 30 MILLIGRAM(S): at 06:53

## 2024-06-05 RX ADMIN — CHLORHEXIDINE GLUCONATE 1 APPLICATION(S): 213 SOLUTION TOPICAL at 07:08

## 2024-06-05 RX ADMIN — Medication 2: at 12:12

## 2024-06-05 RX ADMIN — Medication 3: at 16:43

## 2024-06-05 RX ADMIN — CARVEDILOL PHOSPHATE 6.25 MILLIGRAM(S): 80 CAPSULE, EXTENDED RELEASE ORAL at 06:54

## 2024-06-05 RX ADMIN — CARVEDILOL PHOSPHATE 6.25 MILLIGRAM(S): 80 CAPSULE, EXTENDED RELEASE ORAL at 17:09

## 2024-06-05 RX ADMIN — Medication 650 MILLIGRAM(S): at 12:12

## 2024-06-05 NOTE — OCCUPATIONAL THERAPY INITIAL EVALUATION ADULT - ADDITIONAL COMMENTS
PLOF obtained from daughter. As per daughter patient stays with her for 6 months and then in [place] for 6 months. Pt has RW in Daughter's house but is reluctant to use and holds onto furniture.     Daughter states has RW, Rollator, and shower chair at home.

## 2024-06-05 NOTE — OCCUPATIONAL THERAPY INITIAL EVALUATION ADULT - PERTINENT HX OF CURRENT PROBLEM, REHAB EVAL
Pt is a 89 y/o female with a PMHx of HTN, DM, and vertigo, uses walker and cane to ambulate, presenting to ED for dizziness x 3 days. Pt is [place] peaking, HPI provided majority by daughter, who is at bedside. Patient endorses intermittent episodes of  dizziness x 2 years, however worsening episodes since sunday with worsening balance upon standing. Yesterday, pt states she got dizzy and fell to the ground, striking the back of her head. however denies LOC, not on a/c. Pt endorses today, developed a tension-like HA & dizziness.  CT/CTA (-)  Neuro recommending MRI

## 2024-06-05 NOTE — PHYSICAL THERAPY INITIAL EVALUATION ADULT - PERTINENT HX OF CURRENT PROBLEM, REHAB EVAL
Reason for Admission: Dizziness  History of Present Illness:   Pt is a 89 y/o female with a PMHx of HTN, DM, and vertigo, uses walker and cane to ambulate, presenting to ED for dizziness x 3 days. Pt is Chinese peaking, HPI provided majority by daughter, who is at bedside. Patient endorses intermittent episodes of  dizziness x 2 years, however worsening episodes since sunday with worsening balance upon standing. Yesterday, pt states she got dizzy and fell to the ground, striking the back of her head. however maria teresa LOC, not on a/c. Pt endorses today, developed a tension-like HA & dizziness. Pt denies trying any medications for relief. Patient denies fever, chills, nausea, vomiting, CP, SOB, slurred speech, facial weakness, neck pain/stiffness, abdominal pain, dysuria, hematuria, numbness, tingling, back pain, or any additional complaints.

## 2024-06-05 NOTE — CONSULT NOTE ADULT - NS ATTEND AMEND GEN_ALL_CORE FT
Patient seen and examined and agree with above except as noted.  Patients history, notes, labs, imaging, vitals and meds reviewed personally.  Has lower back pain minimal neck pain  Has been falling more frequently  Exam notable for right hip flexor weakness    Plan  1. MRI lumbar spine  2. Physical therapy for lumbar spine

## 2024-06-05 NOTE — OCCUPATIONAL THERAPY INITIAL EVALUATION ADULT - LIVES WITH, PROFILE
daughter in a private house 4 steps to enter with wide bilateral handrails; 3 + 6 steps with 1 handrail to bedroom/bathroom; (+) bathtub with grab bar/children

## 2024-06-05 NOTE — PHYSICAL THERAPY INITIAL EVALUATION ADULT - GENERAL OBSERVATIONS, REHAB EVAL
10:00-10:20 Chart reviewed. Patient available to be seen for physical therapy, denies pain, confirmed with RN.  Pt rec'd in bed +IV, daughter at bedside, pt in NAD.

## 2024-06-05 NOTE — OCCUPATIONAL THERAPY INITIAL EVALUATION ADULT - GENERAL OBSERVATIONS, REHAB EVAL
13:10-13:35 Chart reviewed, ok to treat by Occupational Therapist as confirmed by RN Eduard Pt received seated in solarium with daughter in NAD. Pt in agreement with OT IE.

## 2024-06-05 NOTE — PHYSICAL THERAPY INITIAL EVALUATION ADULT - GAIT DEVIATIONS NOTED, PT EVAL
Patient is a minor.  Called mother of child, left message to call back.    Courtney Arredondo, BLASN, RN, PHN     decreased [place]inton/decreased step length/decreased stride length

## 2024-06-05 NOTE — PROGRESS NOTE ADULT - ASSESSMENT
Pt is a 87 y/o female with a PMHx of chronic leukocytosis ( [id number](wbc 28-33), HTN, DM, and vertigo, uses walker and cane to ambulate, presenting to ED for dizziness x 3 days.  Admit to inpatient of care: med/surg      #Dizziness/imbalance/HA most likely secondary to vertigo  - CT head no cont: No CT evidence of acute pathology.   - CT angio head/ neck w/ IV: No evidence of major vascular stenosis, occlusion, or aneurysm.  - s/p LR bolus - given in ED  - Continue with IV fluids  - Meclizine tid x 2-3 days prn   - Electrolyte monitoring  - Fall risk protocol  - VS monitoring  - Pain control PRN  - Neurology consulted recs appre.    - MRI lumbar spine  -  Physical therapy for lumbar spine.    #chronic leukocytosis ( [id number](wbc 28-33)  - As per daughter, patient has chronically elevated WBC.  - Patient denies recent fever, n/v/abdominal pain.   - Continue to monitor for infectious etiology.    #Hyponatremia   - Continue with IV fluids  - trend bmp        #HTN  - c/w home meds  - Monitor BP  - DASH diet      #DM  - ISS  - Monitor FS  - CC diet      DVT ppx: Heparin  GI ppx: PPI  Activity: ambulate with assistance.  CHG ordered.  Diet: DASH/CC     dispo: home pending neurology clearance   MRI lumbar spine

## 2024-06-05 NOTE — CONSULT NOTE ADULT - SUBJECTIVE AND OBJECTIVE BOX
Neurology Consult  JUSTA Campuzano [id number]    Patient is a 88y old  Female who presents with a chief complaint of Dizziness (04 Jun 2024 21:54)      HPI:  Pt is a 89 y/o female with a PMHx of HTN, DM, and vertigo, uses walker and cane to ambulate, presenting to ED for dizziness x 3 days. Pt is Qatari peaking, HPI provided majority by daughter, who is at bedside. Patient endorses intermittent episodes of  dizziness x 2 years, however worsening episodes since sunday with worsening balance upon standing. Yesterday, pt states she got dizzy and fell to the ground, striking the back of her head. however maria teresa LOC, not on a/c. Pt endorses today, developed a tension-like HA & dizziness. Pt denies trying any medications for relief. Patient denies fever, chills, nausea, vomiting, CP, SOB, slurred speech, facial weakness, neck pain/stiffness, abdominal pain, dysuria, hematuria, numbness, tingling, back pain, or any additional complaints. (04 Jun 2024 21:54)      Interim HPI -  Pt seen at bedside, daughter present translating information as patient is Qatari speaking only. Daughter takes care of mother. Admits to HPI above, states the above sx have been present for over a month and the hx of falls and unsteadiness has been present for past 3 years. Pt ambulates with cane that progressed to walker associated with multiple falls, patient daughter states fall last year resulted in spinal fxs, not surgical in nature. Pt has sensation of "off balance" and constantly being "pulled" backwards.  Pt denies any other complaints, has intermittent dizziness at various points, and was dx unknown by whom for vertigo in past given medications without any improvements. Pt has no consistent  outpatient neurology or primary medical follow up.  Pt has hx of significantly elevated WBC unknown why and has no hematology f/u.    PAST MEDICAL & SURGICAL HISTORY:  HTN (hypertension)      Diabetes      Vertigo      Status post hip replacement          FAMILY HISTORY:  No pertinent family history in first degree relatives        Allergies    Alcohol (Hives; Angioedema)  No Known Drug Allergies  Alcohol (Hives)    Intolerances        MEDICATIONS  (STANDING):  atorvastatin 20 milliGRAM(s) Oral at bedtime  carvedilol 6.25 milliGRAM(s) Oral every 12 hours  chlorhexidine 2% Cloths 1 Application(s) Topical <User Schedule>  dextrose 10% Bolus 125 milliLiter(s) IV Bolus once  dextrose 5%. 1000 milliLiter(s) (50 mL/Hr) IV Continuous <Continuous>  dextrose 5%. 1000 milliLiter(s) (100 mL/Hr) IV Continuous <Continuous>  dextrose 50% Injectable 25 Gram(s) IV Push once  dextrose 50% Injectable 12.5 Gram(s) IV Push once  glucagon  Injectable 1 milliGRAM(s) IntraMuscular once  heparin   Injectable 5000 Unit(s) SubCutaneous every 8 hours  insulin lispro (ADMELOG) corrective regimen sliding scale   SubCutaneous three times a day before meals  losartan 50 milliGRAM(s) Oral daily  NIFEdipine XL 30 milliGRAM(s) Oral daily  sodium chloride 0.9%. 1000 milliLiter(s) (75 mL/Hr) IV Continuous <Continuous>    MEDICATIONS  (PRN):  dextrose Oral Gel 15 Gram(s) Oral once PRN Blood Glucose LESS THAN 70 milliGRAM(s)/deciliter      Review of systems:    Constitutional: No fever, weight loss or fatigue    Eyes: No eye pain or discharge  ENMT:  No difficulty hearing; No sinus or throat pain  Neck: No pain or stiffness  Respiratory: No cough, wheezing, chills or hemoptysis  Cardiovascular: No chest pain, palpitations, shortness of breath, dyspnea on exertion  Gastrointestinal: No abdominal pain, nausea, vomiting or hematemesis; No diarrhea or constipation.   Genitourinary: No dysuria, frequency, hematuria or incontinence  Neurological: As per HPI  Skin: No rashes or lesions   Endocrine: No heat or cold intolerance; No hair loss  Musculoskeletal: No joint pain or swelling  Psychiatric: No depression, anxiety, mood swings  Heme/Lymph: abnormal wbc no complaints.    Vital Signs Last 24 Hrs  T(C): 36.8 (05 Jun 2024 05:52), Max: 36.8 (05 Jun 2024 05:52)  T(F): 98.3 (05 Jun 2024 05:52), Max: 98.3 (05 Jun 2024 05:52)  HR: 68 (05 Jun 2024 05:52) (64 - 83)  BP: 168/67 (05 Jun 2024 05:52) (145/83 - 171/61)  BP(mean): --  RR: 18 (05 Jun 2024 05:52) (18 - 18)  SpO2: 97% (04 Jun 2024 21:29) (97% - 99%)    Parameters below as of 04 Jun 2024 21:29  Patient On (Oxygen Delivery Method): room air        Neurologic Examination:  General:  Appearance is consistent with chronologic age.  No abnormal facies.   General: The patient is oriented to person, place, time and date.  Recent and remote memory intact.  Fund of knowledge is intact and normal.  Language with normal repetition, comprehension and naming.  Nondysarthric.    Cranial nerves:  VFF.  EOMI w/o nystagmus, skew or reported double vision.  PERRL.  No ptosis/weakness of eyelid closure.  Facial sensation is normal with normal bite.  No facial asymmetry.  Hearing grossly intact b/l.  Palate elevates midline.  Tongue midline.  Motor examination:   Normal tone, bulk and range of motion.  No tenderness, twitching, tremors or involuntary movements.  Formal Muscle Strength Testing: (MRC grade R/L) 5/5 UE; 5/5 LE.  No observable drift.  Reflexes:    triceps, brachioradialis, patella and Achilles.  Plantar response downgoing b/l.  Jaw jerk, Sabrina, clonus absent.  Sensory examination:   Intact to light touch and proprioception  in all extremities.  Cerebellum:   FTN/HKS intact with normal MARK in all limbs.  No dysmetria or dysdiadokinesia.     Labs:   CBC Full  -  ( 05 Jun 2024 08:17 )  WBC Count : 30.90 K/uL  RBC Count : 4.59 M/uL  Hemoglobin : 12.5 g/dL  Hematocrit : 38.5 %  Platelet Count - Automated : 134 K/uL  Mean Cell Volume : 83.9 fL  Mean Cell Hemoglobin : 27.2 pg  Mean Cell Hemoglobin Concentration : 32.5 g/dL      06-05    134<L>  |  100  |  18  ----------------------------<  168<H>  4.1   |  24  |  <0.5<L>    Ca    8.3<L>      05 Jun 2024 08:17  Phos  3.2     06-05  Mg     1.8     06-05    TPro  6.8  /  Alb  3.4<L>  /  TBili  <0.2  /  DBili  x   /  AST  34  /  ALT  33  /  AlkPhos  135<H>  06-04    LIVER FUNCTIONS - ( 04 Jun 2024 17:25 )  Alb: 3.4 g/dL / Pro: 6.8 g/dL / ALK PHOS: 135 U/L / ALT: 33 U/L / AST: 34 U/L / GGT: x             Urinalysis Basic - ( 05 Jun 2024 10:35 )    Color: Yellow / Appearance: Clear / SG: >1.030 / pH: x  Gluc: x / Ketone: Negative mg/dL  / Bili: Negative / Urobili: 1.0 mg/dL   Blood: x / Protein: 30 mg/dL / Nitrite: Negative   Leuk Esterase: Negative / RBC: x / WBC x   Sq Epi: x / Non Sq Epi: x / Bacteria: x          Neuroimaging:  NCHCT: CT Head No Cont:   ACC: [id number] EXAM:  CT BRAIN   ORDERED BY: RYANNE JOHNSON     PROCEDURE DATE:  06/04/2024          INTERPRETATION:  CLINICAL INDICATION: Dizziness.    TECHNIQUE: CT of the head was performed without contrast. Multiple   contiguous axial images were acquired from the skullbase to the vertex   without the administration of intravenous contrast. Coronal and sagittal   reformations were made.    COMPARISON: CT head 5/11/2023.    FINDINGS:    There is stable age-related generalized parenchymal volume loss. There   are multiple patchy periventricular and subcortical hypodensities   consistent with chronic microvascular ischemic changes. Stable basal   ganglia calcifications. Stable partially empty sella.    There is no intracranial hemorrhage, mass effect, or midline shift. There   is no abnormal extra-axial fluid collection. The ventricles are   unremarkable.    Carotid siphon calcifications.    The calvarium is intact. The visualized intraorbital compartments,   paranasal sinuses, and mastoid complexes are unremarkable.    IMPRESSION:    No CT evidence of acute intracranial pathology.  Chronic findings as detailed above.    --- End of Report ---      MASSIEL MIRANDA MD; Resident Radiologist  This document has been electronically signed.  ANGELIC PANDA MD; Attending Interventional Radiologist  This document has been electronically signed. Jun 4 2024  6:52PM (06-04-24 @ 17:48)    CT Angiography/Perfusion:  MRI Brain NC:  MRA Head/Neck:  EEG:    Assessment:  This is a 88y Female with h/o HTN, DM, and vertigo, uses walker and cane to ambulate, presenting to ED for dizziness x 3 days. Pt is Qatari peaking, HPI provided majority by daughter, who is at bedside. Patient endorses intermittent episodes of  dizziness x 2 years, however worsening episodes since Sunday with worsening balance upon standing.  Vertigo vs CVA vs ?    Plan:   -       06-05-24 @ 11:58

## 2024-06-05 NOTE — PHYSICAL THERAPY INITIAL EVALUATION ADULT - ADDITIONAL COMMENTS
Pt's daughter present at bedside to provide interpreting. Pt lives with daughter in house with many steps outside and inside. Pt uses RW for amb PTA.  Pt has had a few falls due to lightheaded and dizziness.

## 2024-06-06 LAB
ALBUMIN SERPL ELPH-MCNC: 3.6 G/DL — SIGNIFICANT CHANGE UP (ref 3.5–5.2)
ALP SERPL-CCNC: 133 U/L — HIGH (ref 30–115)
ALT FLD-CCNC: 35 U/L — SIGNIFICANT CHANGE UP (ref 0–41)
ANION GAP SERPL CALC-SCNC: 9 MMOL/L — SIGNIFICANT CHANGE UP (ref 7–14)
AST SERPL-CCNC: 33 U/L — SIGNIFICANT CHANGE UP (ref 0–41)
BASOPHILS # BLD AUTO: 0.1 K/UL — SIGNIFICANT CHANGE UP (ref 0–0.2)
BASOPHILS NFR BLD AUTO: 0.3 % — SIGNIFICANT CHANGE UP (ref 0–1)
BILIRUB SERPL-MCNC: 0.2 MG/DL — SIGNIFICANT CHANGE UP (ref 0.2–1.2)
BUN SERPL-MCNC: 15 MG/DL — SIGNIFICANT CHANGE UP (ref 10–20)
CALCIUM SERPL-MCNC: 8.6 MG/DL — SIGNIFICANT CHANGE UP (ref 8.4–10.5)
CHLORIDE SERPL-SCNC: 100 MMOL/L — SIGNIFICANT CHANGE UP (ref 98–110)
CO2 SERPL-SCNC: 26 MMOL/L — SIGNIFICANT CHANGE UP (ref 17–32)
CREAT SERPL-MCNC: 0.5 MG/DL — LOW (ref 0.7–1.5)
EGFR: 91 ML/MIN/1.73M2 — SIGNIFICANT CHANGE UP
EOSINOPHIL # BLD AUTO: 0.23 K/UL — SIGNIFICANT CHANGE UP (ref 0–0.7)
EOSINOPHIL NFR BLD AUTO: 0.6 % — SIGNIFICANT CHANGE UP (ref 0–8)
GLUCOSE BLDC GLUCOMTR-MCNC: 179 MG/DL — HIGH (ref 70–99)
GLUCOSE BLDC GLUCOMTR-MCNC: 208 MG/DL — HIGH (ref 70–99)
GLUCOSE BLDC GLUCOMTR-MCNC: 272 MG/DL — HIGH (ref 70–99)
GLUCOSE BLDC GLUCOMTR-MCNC: 348 MG/DL — HIGH (ref 70–99)
GLUCOSE SERPL-MCNC: 216 MG/DL — HIGH (ref 70–99)
HCT VFR BLD CALC: 40.2 % — SIGNIFICANT CHANGE UP (ref 37–47)
HGB BLD-MCNC: 13.3 G/DL — SIGNIFICANT CHANGE UP (ref 12–16)
IMM GRANULOCYTES NFR BLD AUTO: 0.1 % — SIGNIFICANT CHANGE UP (ref 0.1–0.3)
LYMPHOCYTES # BLD AUTO: 32.61 K/UL — HIGH (ref 1.2–3.4)
LYMPHOCYTES # BLD AUTO: 88.5 % — HIGH (ref 20.5–51.1)
MCHC RBC-ENTMCNC: 27.5 PG — SIGNIFICANT CHANGE UP (ref 27–31)
MCHC RBC-ENTMCNC: 33.1 G/DL — SIGNIFICANT CHANGE UP (ref 32–37)
MCV RBC AUTO: 83.1 FL — SIGNIFICANT CHANGE UP (ref 81–99)
MONOCYTES # BLD AUTO: 1.35 K/UL — HIGH (ref 0.1–0.6)
MONOCYTES NFR BLD AUTO: 3.7 % — SIGNIFICANT CHANGE UP (ref 1.7–9.3)
NEUTROPHILS # BLD AUTO: 2.53 K/UL — SIGNIFICANT CHANGE UP (ref 1.4–6.5)
NEUTROPHILS NFR BLD AUTO: 6.8 % — LOW (ref 42.2–75.2)
NRBC # BLD: 0 /100 WBCS — SIGNIFICANT CHANGE UP (ref 0–0)
PLATELET # BLD AUTO: 151 K/UL — SIGNIFICANT CHANGE UP (ref 130–400)
PMV BLD: 9.2 FL — SIGNIFICANT CHANGE UP (ref 7.4–10.4)
POTASSIUM SERPL-MCNC: 4.6 MMOL/L — SIGNIFICANT CHANGE UP (ref 3.5–5)
POTASSIUM SERPL-SCNC: 4.6 MMOL/L — SIGNIFICANT CHANGE UP (ref 3.5–5)
PROT SERPL-MCNC: 7 G/DL — SIGNIFICANT CHANGE UP (ref 6–8)
RBC # BLD: 4.84 M/UL — SIGNIFICANT CHANGE UP (ref 4.2–5.4)
RBC # FLD: 15 % — HIGH (ref 11.5–14.5)
SODIUM SERPL-SCNC: 135 MMOL/L — SIGNIFICANT CHANGE UP (ref 135–146)
WBC # BLD: 36.86 K/UL — HIGH (ref 4.8–10.8)
WBC # FLD AUTO: 36.86 K/UL — HIGH (ref 4.8–10.8)

## 2024-06-06 PROCEDURE — 99233 SBSQ HOSP IP/OBS HIGH 50: CPT

## 2024-06-06 RX ADMIN — Medication 30 MILLIGRAM(S): at 05:16

## 2024-06-06 RX ADMIN — HEPARIN SODIUM 5000 UNIT(S): 5000 INJECTION INTRAVENOUS; SUBCUTANEOUS at 05:16

## 2024-06-06 RX ADMIN — Medication 1: at 17:21

## 2024-06-06 RX ADMIN — HEPARIN SODIUM 5000 UNIT(S): 5000 INJECTION INTRAVENOUS; SUBCUTANEOUS at 21:55

## 2024-06-06 RX ADMIN — CHLORHEXIDINE GLUCONATE 1 APPLICATION(S): 213 SOLUTION TOPICAL at 05:21

## 2024-06-06 RX ADMIN — ATORVASTATIN CALCIUM 20 MILLIGRAM(S): 80 TABLET, FILM COATED ORAL at 21:54

## 2024-06-06 RX ADMIN — Medication 3: at 11:56

## 2024-06-06 RX ADMIN — CARVEDILOL PHOSPHATE 6.25 MILLIGRAM(S): 80 CAPSULE, EXTENDED RELEASE ORAL at 05:16

## 2024-06-06 RX ADMIN — SODIUM CHLORIDE 75 MILLILITER(S): 9 INJECTION INTRAMUSCULAR; INTRAVENOUS; SUBCUTANEOUS at 17:25

## 2024-06-06 RX ADMIN — CARVEDILOL PHOSPHATE 6.25 MILLIGRAM(S): 80 CAPSULE, EXTENDED RELEASE ORAL at 17:25

## 2024-06-06 RX ADMIN — HEPARIN SODIUM 5000 UNIT(S): 5000 INJECTION INTRAVENOUS; SUBCUTANEOUS at 16:42

## 2024-06-06 RX ADMIN — Medication 2: at 08:19

## 2024-06-06 RX ADMIN — LOSARTAN POTASSIUM 50 MILLIGRAM(S): 100 TABLET, FILM COATED ORAL at 05:16

## 2024-06-06 NOTE — PROGRESS NOTE ADULT - ASSESSMENT
Pt is a 87 y/o female with a PMHx of chronic leukocytosis ( 7452-6995(wbc 28-33), HTN, DM, and vertigo, uses walker and cane to ambulate, presenting to ED for dizziness x 3 days.  Admit to inpatient of care: med/surg    #Dizziness/imbalance/HA most likely secondary to vertigo  - CT head no cont: No CT evidence of acute pathology.   - CT angio head/ neck w/ IV: No evidence of major vascular stenosis, occlusion, or aneurysm.  - s/p LR bolus - given in ED  - Continue with IV fluids  - Meclizine tid x 2-3 days prn   - Electrolyte monitoring  - Fall risk protocol  - VS monitoring  - Pain control PRN  - Neurology consulted recs appre.    - MRI lumbar spine as per neurology   - Physical therapy for lumbar spine.    #chronic leukocytosis ( 0944-0532(wbc 28-33)  - As per daughter, patient has chronically elevated WBC.  - Patient denies recent fever, n/v/abdominal pain.   - Continue to monitor for infectious etiology.  - WBC today36.86 afebrile     #Hyponatremia   - Continue with IV fluids  - trend bmp     #HTN  - c/w home meds  - Monitor BP  - DASH diet    #DM  - ISS  - Monitor FS  - CC diet    DVT ppx: Heparin  GI ppx: PPI  Activity: ambulate with assistance.  CHG ordered.  Diet: DASH/CC     dispo: home pending neurology clearance   MRI lumbar spine

## 2024-06-06 NOTE — PROGRESS NOTE ADULT - SUBJECTIVE AND OBJECTIVE BOX
CATHY SEPULVEDA 88y Female  MRN#: [id number]   Hospital Day: 1d    SUBJECTIVE  Patient is a 88y old Female who presents with a chief complaint of Dizziness (05 Jun 2024 11:58)  Currently admitted to medicine with the primary diagnosis of Gait instability      INTERVAL HPI AND OVERNIGHT EVENTS:  Patient was examined and seen at bedside. This morning she is resting comfortably in bed and reports no issues or overnight events.  dizziness improved denies chest pain , sob, n/v/d/c, hematuria or bloody stool.     REVIEW OF SYMPTOMS:  10 point ROS negative except as above.    OBJECTIVE  PAST MEDICAL & SURGICAL HISTORY  HTN (hypertension)    Diabetes    Vertigo    Status post hip replacement      ALLERGIES:  Alcohol (Hives; Angioedema)  No Known Drug Allergies  Alcohol (Hives)    MEDICATIONS:  STANDING MEDICATIONS  atorvastatin 20 milliGRAM(s) Oral at bedtime  carvedilol 6.25 milliGRAM(s) Oral every 12 hours  chlorhexidine 2% Cloths 1 Application(s) Topical <User Schedule>  dextrose 10% Bolus 125 milliLiter(s) IV Bolus once  dextrose 5%. 1000 milliLiter(s) IV Continuous <Continuous>  dextrose 5%. 1000 milliLiter(s) IV Continuous <Continuous>  dextrose 50% Injectable 25 Gram(s) IV Push once  dextrose 50% Injectable 12.5 Gram(s) IV Push once  glucagon  Injectable 1 milliGRAM(s) IntraMuscular once  heparin   Injectable 5000 Unit(s) SubCutaneous every 8 hours  insulin lispro (ADMELOG) corrective regimen sliding scale   SubCutaneous three times a day before meals  losartan 50 milliGRAM(s) Oral daily  NIFEdipine XL 30 milliGRAM(s) Oral daily  sodium chloride 0.9%. 1000 milliLiter(s) IV Continuous <Continuous>    PRN MEDICATIONS  acetaminophen     Tablet .. 650 milliGRAM(s) Oral every 6 hours PRN  dextrose Oral Gel 15 Gram(s) Oral once PRN      VITAL SIGNS: Last 24 Hours  T(C): 36.9 (05 Jun 2024 13:42), Max: 36.9 (05 Jun 2024 13:42)  T(F): 98.5 (05 Jun 2024 13:42), Max: 98.5 (05 Jun 2024 13:42)  HR: 74 (05 Jun 2024 13:42) (64 - 74)  BP: 164/85 (05 Jun 2024 13:42) (145/83 - 168/67)  BP(mean): --  RR: 18 (05 Jun 2024 13:42) (18 - 18)  SpO2: 95% (05 Jun 2024 13:42) (95% - 97%)    PHYSICAL EXAM:  GENERAL: NAD, well-groomed, well-developed  HEENT - NC/AT, pupils equal and reactive to light,   NECK: Supple, No JVD  CHEST/LUNG: Clear to auscultation bilaterally; No rales, rhonchi, wheezing  HEART: Regular rate and rhythm; No murmurs, rubs, or gallops  ABDOMEN: Soft, Nontender, Nondistended; Bowel sounds present  EXTREMITIES:  2+ Peripheral Pulses, No clubbing, cyanosis, or edema  NEURO:  No Focal deficits, sensory and motor intact  SKIN: No rashes or lesions    LABS:                        12.5   30.90 )-----------( 134      ( 05 Jun 2024 08:17 )             38.5     06-05    134<L>  |  100  |  18  ----------------------------<  168<H>  4.1   |  24  |  <0.5<L>    Ca    8.3<L>      05 Jun 2024 08:17  Phos  3.2     06-05  Mg     1.8     06-05    TPro  6.8  /  Alb  3.4<L>  /  TBili  <0.2  /  DBili  x   /  AST  34  /  ALT  33  /  AlkPhos  135<H>  06-04      Urinalysis Basic - ( 05 Jun 2024 10:35 )    Color: Yellow / Appearance: Clear / SG: >1.030 / pH: x  Gluc: x / Ketone: Negative mg/dL  / Bili: Negative / Urobili: 1.0 mg/dL   Blood: x / Protein: 30 mg/dL / Nitrite: Negative   Leuk Esterase: Negative / RBC: 2 /HPF / WBC 2 /HPF   Sq Epi: x / Non Sq Epi: 2 /HPF / Bacteria: Occasional /HPF            Urinalysis with Rflx Culture (collected 05 Jun 2024 10:35)      RADIOLOGY:  < from: Xray Chest 1 View AP/PA (06.04.24 @ 17:38) >    Impression:    No radiographic evidence of acute cardiopulmonary disease.      < end of copied text >  < from: CT Head No Cont (06.04.24 @ 17:48) >    IMPRESSION:    No CT evidence of acute intracranial pathology.  Chronic findings as detailed above.    < end of copied text >  < from: CT Angio Head w/ IV Cont (06.04.24 @ 18:54) >    IMPRESSION:    No evidence of major vascular stenosis, occlusion, or aneurysm.    < end of copied text >    
  CATHY SEPULVEDA 88y Female  MRN#: [id number]   Hospital Day: 1d    SUBJECTIVE  Patient is a 88y old Female who presents with a chief complaint of Dizziness (05 Jun 2024 11:58)  Currently admitted to medicine with the primary diagnosis of Gait instability    INTERVAL HPI AND OVERNIGHT EVENTS:  Patient was examined and seen at bedside. This morning she is resting comfortably in bed and reports no issues or overnight events.  dizziness improved denies chest pain , sob, n/v/d/c, hematuria or bloody stool.     REVIEW OF SYMPTOMS:  10 point ROS negative except as above.    OBJECTIVE  PAST MEDICAL & SURGICAL HISTORY  HTN (hypertension)    Diabetes    Vertigo    Status post hip replacement      ALLERGIES:  Alcohol (Hives; Angioedema)  No Known Drug Allergies  Alcohol (Hives)    MEDICATIONS:  STANDING MEDICATIONS  atorvastatin 20 milliGRAM(s) Oral at bedtime  carvedilol 6.25 milliGRAM(s) Oral every 12 hours  chlorhexidine 2% Cloths 1 Application(s) Topical <User Schedule>  dextrose 10% Bolus 125 milliLiter(s) IV Bolus once  dextrose 5%. 1000 milliLiter(s) IV Continuous <Continuous>  dextrose 5%. 1000 milliLiter(s) IV Continuous <Continuous>  dextrose 50% Injectable 25 Gram(s) IV Push once  dextrose 50% Injectable 12.5 Gram(s) IV Push once  glucagon  Injectable 1 milliGRAM(s) IntraMuscular once  heparin   Injectable 5000 Unit(s) SubCutaneous every 8 hours  insulin lispro (ADMELOG) corrective regimen sliding scale   SubCutaneous three times a day before meals  losartan 50 milliGRAM(s) Oral daily  NIFEdipine XL 30 milliGRAM(s) Oral daily  sodium chloride 0.9%. 1000 milliLiter(s) IV Continuous <Continuous>    PRN MEDICATIONS  acetaminophen     Tablet .. 650 milliGRAM(s) Oral every 6 hours PRN  dextrose Oral Gel 15 Gram(s) Oral once PRN      VITAL SIGNS: Last 24 Hours  Vital Signs Last 24 Hrs  T(C): 36.5 (06 Jun 2024 14:22), Max: 36.7 (05 Jun 2024 20:10)  T(F): 97.7 (06 Jun 2024 14:22), Max: 98 (05 Jun 2024 20:10)  HR: 79 (06 Jun 2024 14:22) (74 - 79)  BP: 157/74 (06 Jun 2024 14:22) (157/74 - 178/70)  BP(mean): --  RR: 18 (06 Jun 2024 14:22) (18 - 18)  SpO2: 97% (06 Jun 2024 04:25) (97% - 97%)    Parameters below as of 06 Jun 2024 04:25  Patient On (Oxygen Delivery Method): room air    PHYSICAL EXAM:  GENERAL: NAD, well-groomed, well-developed  HEENT : NC/AT, pupils equal and reactive to light,   NECK: Supple, No JVD  CHEST/LUNG: Clear to auscultation bilaterally; No rales, rhonchi, wheezing  HEART: Regular rate and rhythm; No murmurs, rubs, or gallops  A                      13.3   36.86 )-----------( 151      ( 06 Jun 2024 08:43 )             40.2     06-06    135  |  100  |  15  ----------------------------<  216<H>  4.6   |  26  |  0.5<L>    Ca    8.6      06 Jun 2024 08:43  Phos  3.2     06-05  Mg     1.8     06-05    TPro  7.0  /  Alb  3.6  /  TBili  0.2  /  DBili  x   /  AST  33  /  ALT  35  /  AlkPhos  133<H>  06-06      Urinalysis Basic - ( 06 Jun 2024 08:43 )    Color: x / Appearance: x / SG: x / pH: x  Gluc: 216 mg/dL / Ketone: x  / Bili: x / Urobili: x   Blood: x / Protein: x / Nitrite: x   Leuk Esterase: x / RBC: x / WBC x   Sq Epi: x / Non Sq Epi: x / Bacteria: x            Urinalysis with Rflx Culture (collected 05 Jun 2024 10:35)        BDOMEN: Soft, Nontender, Nondistended; Bowel sounds present  EXTREMITIES:  2+ Peripheral Pulses, No clubbing, cyanosis, or edema  NEURO:  No Focal deficits, sensory and motor intact  SKIN: No rashes or lesions    LABS:          RADIOLOGY:  < from: Xray Chest 1 View AP/PA (06.04.24 @ 17:38) >    Impression:    No radiographic evidence of acute cardiopulmonary disease.      < end of copied text >  < from: CT Head No Cont (06.04.24 @ 17:48) >    IMPRESSION:    No CT evidence of acute intracranial pathology.  Chronic findings as detailed above.    < end of copied text >  < from: CT Angio Head w/ IV Cont (06.04.24 @ 18:54) >    IMPRESSION:    No evidence of major vascular stenosis, occlusion, or aneurysm.    < end of copied text >

## 2024-06-07 ENCOUNTER — TRANSCRIPTION ENCOUNTER (OUTPATIENT)
Age: 89
End: 2024-06-07

## 2024-06-07 VITALS
RESPIRATION RATE: 18 BRPM | TEMPERATURE: 98 F | DIASTOLIC BLOOD PRESSURE: 77 MMHG | HEART RATE: 69 BPM | SYSTOLIC BLOOD PRESSURE: 100 MMHG

## 2024-06-07 LAB
GLUCOSE BLDC GLUCOMTR-MCNC: 247 MG/DL — HIGH (ref 70–99)
GLUCOSE BLDC GLUCOMTR-MCNC: 275 MG/DL — HIGH (ref 70–99)
GLUCOSE BLDC GLUCOMTR-MCNC: 321 MG/DL — HIGH (ref 70–99)
GLUCOSE BLDC GLUCOMTR-MCNC: 330 MG/DL — HIGH (ref 70–99)
GLUCOSE BLDC GLUCOMTR-MCNC: 410 MG/DL — HIGH (ref 70–99)

## 2024-06-07 PROCEDURE — 99239 HOSP IP/OBS DSCHRG MGMT >30: CPT

## 2024-06-07 PROCEDURE — 72148 MRI LUMBAR SPINE W/O DYE: CPT | Mod: 26

## 2024-06-07 RX ORDER — LOSARTAN POTASSIUM 100 MG/1
1 TABLET, FILM COATED ORAL
Qty: 30 | Refills: 0
Start: 2024-06-07 | End: 2024-07-06

## 2024-06-07 RX ORDER — NIFEDIPINE 30 MG
60 TABLET, EXTENDED RELEASE 24 HR ORAL DAILY
Refills: 0 | Status: DISCONTINUED | OUTPATIENT
Start: 2024-06-07 | End: 2024-06-07

## 2024-06-07 RX ORDER — EMPAGLIFLOZIN 10 MG/1
1 TABLET, FILM COATED ORAL
Refills: 0 | DISCHARGE

## 2024-06-07 RX ORDER — EMPAGLIFLOZIN 10 MG/1
1 TABLET, FILM COATED ORAL
Qty: 30 | Refills: 0
Start: 2024-06-07 | End: 2024-07-06

## 2024-06-07 RX ORDER — NIFEDIPINE 30 MG
1 TABLET, EXTENDED RELEASE 24 HR ORAL
Qty: 30 | Refills: 0
Start: 2024-06-07 | End: 2024-07-06

## 2024-06-07 RX ORDER — HYDRALAZINE HCL 50 MG
10 TABLET ORAL THREE TIMES A DAY
Refills: 0 | Status: DISCONTINUED | OUTPATIENT
Start: 2024-06-07 | End: 2024-06-07

## 2024-06-07 RX ORDER — CARVEDILOL PHOSPHATE 80 MG/1
1 CAPSULE, EXTENDED RELEASE ORAL
Refills: 0 | DISCHARGE

## 2024-06-07 RX ORDER — INSULIN GLARGINE 100 [IU]/ML
15 INJECTION, SOLUTION SUBCUTANEOUS ONCE
Refills: 0 | Status: COMPLETED | OUTPATIENT
Start: 2024-06-07 | End: 2024-06-07

## 2024-06-07 RX ORDER — CARVEDILOL PHOSPHATE 80 MG/1
1 CAPSULE, EXTENDED RELEASE ORAL
Qty: 30 | Refills: 0
Start: 2024-06-07 | End: 2024-07-06

## 2024-06-07 RX ORDER — LOSARTAN POTASSIUM 100 MG/1
1 TABLET, FILM COATED ORAL
Qty: 60 | Refills: 0
Start: 2024-06-07 | End: 2024-07-06

## 2024-06-07 RX ORDER — MECLIZINE HCL 12.5 MG
1 TABLET ORAL
Qty: 60 | Refills: 0
Start: 2024-06-07 | End: 2024-07-06

## 2024-06-07 RX ORDER — METFORMIN HYDROCHLORIDE 850 MG/1
1 TABLET ORAL
Qty: 60 | Refills: 0
Start: 2024-06-07 | End: 2024-07-06

## 2024-06-07 RX ADMIN — HEPARIN SODIUM 5000 UNIT(S): 5000 INJECTION INTRAVENOUS; SUBCUTANEOUS at 13:10

## 2024-06-07 RX ADMIN — CARVEDILOL PHOSPHATE 6.25 MILLIGRAM(S): 80 CAPSULE, EXTENDED RELEASE ORAL at 05:41

## 2024-06-07 RX ADMIN — Medication 4: at 11:54

## 2024-06-07 RX ADMIN — INSULIN GLARGINE 15 UNIT(S): 100 INJECTION, SOLUTION SUBCUTANEOUS at 20:18

## 2024-06-07 RX ADMIN — Medication 2: at 08:06

## 2024-06-07 RX ADMIN — HEPARIN SODIUM 5000 UNIT(S): 5000 INJECTION INTRAVENOUS; SUBCUTANEOUS at 05:42

## 2024-06-07 RX ADMIN — CARVEDILOL PHOSPHATE 6.25 MILLIGRAM(S): 80 CAPSULE, EXTENDED RELEASE ORAL at 18:01

## 2024-06-07 RX ADMIN — LOSARTAN POTASSIUM 50 MILLIGRAM(S): 100 TABLET, FILM COATED ORAL at 05:42

## 2024-06-07 RX ADMIN — Medication 30 MILLIGRAM(S): at 05:42

## 2024-06-07 NOTE — CONSULT NOTE ADULT - ASSESSMENT
88 year old female with chronic lymphocytosis , doubling time nearly 5 years , normal Hgb and platelets . 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  Admission and current symptoms unrelated to hematologic disorder .  no need for additional work up , can follow up as outpatient in 6 months .

## 2024-06-07 NOTE — DISCHARGE NOTE PROVIDER - ATTENDING DISCHARGE PHYSICAL EXAMINATION:
PHYSICAL EXAM:  GENERAL: NAD, well-groomed, well-developed  HEENT - NC/AT, pupils equal and reactive to light,   NECK: Supple, No JVD  CHEST/LUNG: Clear to auscultation bilaterally; No rales, rhonchi, wheezing  HEART: Regular rate and rhythm; No murmurs, rubs, or gallops  ABDOMEN: Soft, Nontender, Nondistended; Bowel sounds present  EXTREMITIES:  2+ Peripheral Pulses, No clubbing, cyanosis, or edema  NEURO:  aox2, No Focal deficits, sensory and motor intact  SKIN: No rashes or lesions

## 2024-06-07 NOTE — CHART NOTE - NSCHARTNOTEFT_GEN_A_CORE
Informed by RN due to patient wanting to sign out AMA.   Pt refusing  services, opting for pt's daughter at bedside to translate:   Despite extensive discussion regarding the benefits of staying vs the risks associated with leaving against medical advice, pt as decided to leave against medical advice (AMA). She is A&Ox 4 and of full decisional capacity. I explained the risks, benefits, and alternatives to treatment. The patient understands her condition and the risks of leaving AMA, including but not limited to permanent disability & death     The patient has been informed that he may return for care at any time, and has been referred to local medical physician for follow up ASAP.     Pt signed out AMA, and verbally accepts responsibility for any and all results of this decision.       Medical attending informed Informed by RN due to patient wanting to sign out AMA.   Pt refusing  services, nodding toward pt's daughter at bedside to translate:   Despite extensive discussion regarding the benefits of staying vs the risks associated with leaving against medical advice, pt as decided to leave against medical advice (AMA).  The patient has been informed that he may return for care at any time, and has been referred to local medical physician for follow up ASAP.     Pt signed out AMA, and verbally accepts responsibility for any and all results of this decision.       Medical attending informed

## 2024-06-07 NOTE — DISCHARGE NOTE PROVIDER - HOSPITAL COURSE
Pt is a 89 y/o female with a PMHx of chronic leukocytosis ( 3748-0805(wbc 28-33), HTN, DM, and vertigo, uses walker and cane to ambulate, presenting to ED for dizziness x 3 days.  Admit to inpatient of care: med/surg.    #Dizziness/imbalance/HA 2/2 vertigo  - CT head no cont: No CT evidence of acute pathology.   - CT angio head/ neck w/ IV: No evidence of major vascular stenosis, occlusion, or aneurysm.  - s/p LR bolus - given in ED  - Continue with IV fluids  - Meclizine tid x 2-3 days prn   - Fall risk protocol  - Pain control PRN  - Neurology consulted recs appre.    - MRI lumbar spine  multiple chronic compression fracture( called neurosurgery no intervention recommended given chronic fractures)   - Physical therapy outpatient as per daughter request   - symptoms improved with meclizine     #chronic leukocytosis ( 4309-1235(wbc 28-33)  - As per daughter, patient has chronically elevated WBC.  - Patient denies recent fever, n/v/abdominal pain.   - Continue to monitor for infectious etiology.  - WBC today36.86 afebrile   - hematology rexs no intervention - f/u outpatient in 6 months     #Hyponatremia improved   - poor sodium diet     #HTN  - c/w home meds    #DM II c/w oral medication      Pt is a 87 y/o female with a PMHx of chronic leukocytosis ( 2730-1211(wbc 28-33), HTN, DM, and vertigo, uses walker and cane to ambulate, presenting to ED for dizziness x 3 days.  Admit to inpatient of care: med/surg.    #Dizziness/imbalance/HA 2/2 vertigo  - CT head no cont: No CT evidence of acute pathology.   - CT angio head/ neck w/ IV: No evidence of major vascular stenosis, occlusion, or aneurysm.  - s/p LR bolus - given in ED  - Continue with IV fluids  - Meclizine tid x 2-3 days prn   - Fall risk protocol  - Pain control PRN  - Neurology consulted recs appre.    - MRI lumbar spine  multiple chronic compression fracture( called neurosurgery no intervention recommended given chronic fractures)   - Physical therapy outpatient as per daughter request   - symptoms improved with meclizine     #chronic leukocytosis ( 6317-1231(wbc 28-33)  - As per daughter, patient has chronically elevated WBC.  - Patient denies recent fever, n/v/abdominal pain.   - Continue to monitor for infectious etiology.  - WBC today36.86 afebrile   - hematology rexs no intervention - f/u outpatient in 6 months     #Hyponatremia improved   - poor sodium diet     #HTN  - c/w home meds  - increase bp meds for better control    #DM II c/w oral medication   - patient glucose inhouse uncontrolled, likely 2/2 diet, give dose of lantus and lispro sliding scale       patient and daughter educated to stay until tomorrow for better BP and glucose control, refused to stay and signed AMA, patient daughter educated on risk of leaving AMA. and risk of stroke, DKA, and hyperglycemia and death.

## 2024-06-07 NOTE — CHART NOTE - NSCHARTNOTEFT_GEN_A_CORE
Daughter asked to see me as patient wishes to go home. In fact discharge was planned earlier today but postponed due to high blood pressure and high glucose. BP improved without intervention as did FSBS. Daughter adds that while here patient was served such items as cake and RN confirms there was an issue with her diet though now resolved (could explain issues with FSBS control) Although I recommended that patient stay to be "observed" overnight, will order discharge per their request Daughter asked to see me as patient wishes to go home. In fact discharge was planned earlier today but postponed due to high blood pressure and high glucose. BP improved without intervention as did FSBS. Daughter adds that while here patient was served such items as cake and RN confirms there was an issue with her diet though now resolved (could explain issues with FSBS control) Daughter, who is a dietician, tells me she is very strict with her diet at home and her FSBS never goes above 180.  Although I recommended that patient stay to be "observed" overnight, will order discharge per their request

## 2024-06-07 NOTE — CONSULT NOTE ADULT - SUBJECTIVE AND OBJECTIVE BOX
Patient is a 88y old  Female who presents with a chief complaint of Dizziness (06 Jun 2024 16:14)      HPI:  Pt is a 87 y/o female with a PMHx of HTN, DM, and vertigo, uses walker and cane to ambulate, presenting to ED for dizziness x 3 days. Pt is [place] peaking, HPI provided majority by daughter, who is at bedside. Patient endorses intermittent episodes of  dizziness x 2 years, however worsening episodes since sunday with worsening balance upon standing. Yesterday, pt states she got dizzy and fell to the ground, striking the back of her head. however maria teresa LOC, not on a/c. Pt endorses today, developed a tension-like HA & dizziness. Pt denies trying any medications for relief. Patient denies fever, chills, nausea, vomiting, CP, SOB, slurred speech, facial weakness, neck pain/stiffness, abdominal pain, dysuria, hematuria, numbness, tingling, back pain, or any additional complaints. (04 Jun 2024 21:54)       ROS:  Negative except for:    PAST MEDICAL & SURGICAL HISTORY:  HTN (hypertension)      Diabetes      Vertigo      Status post hip replacement          SOCIAL HISTORY:    FAMILY HISTORY:  No pertinent family history in first degree relatives        MEDICATIONS  (STANDING):  atorvastatin 20 milliGRAM(s) Oral at bedtime  carvedilol 6.25 milliGRAM(s) Oral every 12 hours  chlorhexidine 2% Cloths 1 Application(s) Topical <User Schedule>  dextrose 10% Bolus 125 milliLiter(s) IV Bolus once  dextrose 5%. 1000 milliLiter(s) (50 mL/Hr) IV Continuous <Continuous>  dextrose 5%. 1000 milliLiter(s) (100 mL/Hr) IV Continuous <Continuous>  dextrose 50% Injectable 25 Gram(s) IV Push once  dextrose 50% Injectable 12.5 Gram(s) IV Push once  glucagon  Injectable 1 milliGRAM(s) IntraMuscular once  heparin   Injectable 5000 Unit(s) SubCutaneous every 8 hours  insulin lispro (ADMELOG) corrective regimen sliding scale   SubCutaneous three times a day before meals  losartan 50 milliGRAM(s) Oral daily  NIFEdipine XL 30 milliGRAM(s) Oral daily  sodium chloride 0.9%. 1000 milliLiter(s) (75 mL/Hr) IV Continuous <Continuous>    MEDICATIONS  (PRN):  acetaminophen     Tablet .. 650 milliGRAM(s) Oral every 6 hours PRN Temp greater or equal to 38C (100.4F), Mild Pain (1 - 3)  dextrose Oral Gel 15 Gram(s) Oral once PRN Blood Glucose LESS THAN 70 milliGRAM(s)/deciliter      Allergies    Alcohol (Hives; Angioedema)  No Known Drug Allergies    Intolerances        Vital Signs Last 24 Hrs  T(C): 36.8 (07 Jun 2024 04:40), Max: 36.8 (07 Jun 2024 04:40)  T(F): 98.3 (07 Jun 2024 04:40), Max: 98.3 (07 Jun 2024 04:40)  HR: 65 (07 Jun 2024 04:40) (65 - 79)  BP: 170/71 (07 Jun 2024 04:40) (157/74 - 170/71)  BP(mean): --  RR: 18 (07 Jun 2024 04:40) (18 - 19)  SpO2: 100% (07 Jun 2024 04:40) (96% - 100%)    Parameters below as of 07 Jun 2024 04:40  Patient On (Oxygen Delivery Method): room air        PHYSICAL EXAM  General: adult in NAD  HEENT: clear oropharynx, anicteric sclera, pink conjunctiva  Neck: supple  CV: normal S1/S2 with no murmur rubs or gallops  Lungs: positive air movement b/l ant lungs,clear to auscultation, no wheezes, no rales  Abdomen: soft non-tender non-distended, no hepatosplenomegaly  Ext: no clubbing cyanosis or edema  Skin: no rashes and no petechiae  Neuro: alert and oriented X 4, no focal deficits      LABS:                          13.3   36.86 )-----------( 151      ( 06 Jun 2024 08:43 )             40.2         Mean Cell Volume : 83.1 fL  Mean Cell Hemoglobin : 27.5 pg  Mean Cell Hemoglobin Concentration : 33.1 g/dL  Auto Neutrophil # : 2.53 K/uL  Auto Lymphocyte # : 32.61 K/uL  Auto Monocyte # : 1.35 K/uL  Auto Eosinophil # : 0.23 K/uL  Auto Basophil # : 0.10 K/uL  Auto Neutrophil % : 6.8 %  Auto Lymphocyte % : 88.5 %  Auto Monocyte % : 3.7 %  Auto Eosinophil % : 0.6 %  Auto Basophil % : 0.3 %      Serial CBC's  06-06 @ 08:43  Hct-40.2 / Hgb-13.3 / Plat-151 / RBC-4.84 / WBC-36.86  Serial CBC's  06-05 @ 08:17  Hct-38.5 / Hgb-12.5 / Plat-134 / RBC-4.59 / WBC-30.90  Serial CBC's  06-04 @ 17:25  Hct-38.8 / Hgb-12.9 / Plat-145 / RBC-4.66 / WBC-34.73      06-06    135  |  100  |  15  ----------------------------<  216<H>  4.6   |  26  |  0.5<L>    Ca    8.6      06 Jun 2024 08:43  Phos  3.2     06-05  Mg     1.8     06-05    TPro  7.0  /  Alb  3.6  /  TBili  0.2  /  DBili  x   /  AST  33  /  ALT  35  /  AlkPhos  133<H>  06-06                      BLOOD SMEAR INTERPRETATION:       RADIOLOGY & ADDITIONAL STUDIES:     Patient is a 88y old  Female who presents with a chief complaint of Dizziness (06 Jun 2024 16:14)      HPI:  Pt is a 89 y/o female with a PMHx of HTN, DM, and vertigo, uses walker and cane to ambulate, presenting to ED for dizziness x 3 days. Pt is Japanese peaking, HPI provided majority by daughter, who is at bedside. Patient endorses intermittent episodes of  dizziness x 2 years, however worsening episodes since sunday with worsening balance upon standing. Yesterday, pt states she got dizzy and fell to the ground, striking the back of her head. however maria teresa LOC, not on a/c. Pt endorses today, developed a tension-like HA & dizziness. Pt denies trying any medications for relief. Patient denies fever, chills, nausea, vomiting, CP, SOB, slurred speech, facial weakness, neck pain/stiffness, abdominal pain, dysuria, hematuria, numbness, tingling, back pain, or any additional complaints. (04 Jun 2024 21:54)     Hem/Onc : Attempted to see in patient twice this AM , she was not in her bed . Chart reviewed , she has chronic lymphocytosis at least since 2019 , absolute lymph : 16.5( 2019 ) ----24.2(2022 )----26 ( 2023 ) ----32 ( 2024 )  doubling time nearly 5 years, normal Hgb and platelets .             MEDICATIONS  (STANDING):  atorvastatin 20 milliGRAM(s) Oral at bedtime  carvedilol 6.25 milliGRAM(s) Oral every 12 hours  chlorhexidine 2% Cloths 1 Application(s) Topical <User Schedule>  dextrose 10% Bolus 125 milliLiter(s) IV Bolus once  dextrose 5%. 1000 milliLiter(s) (50 mL/Hr) IV Continuous <Continuous>  dextrose 5%. 1000 milliLiter(s) (100 mL/Hr) IV Continuous <Continuous>  dextrose 50% Injectable 25 Gram(s) IV Push once  dextrose 50% Injectable 12.5 Gram(s) IV Push once  glucagon  Injectable 1 milliGRAM(s) IntraMuscular once  heparin   Injectable 5000 Unit(s) SubCutaneous every 8 hours  insulin lispro (ADMELOG) corrective regimen sliding scale   SubCutaneous three times a day before meals  losartan 50 milliGRAM(s) Oral daily  NIFEdipine XL 30 milliGRAM(s) Oral daily  sodium chloride 0.9%. 1000 milliLiter(s) (75 mL/Hr) IV Continuous <Continuous>    MEDICATIONS  (PRN):  acetaminophen     Tablet .. 650 milliGRAM(s) Oral every 6 hours PRN Temp greater or equal to 38C (100.4F), Mild Pain (1 - 3)  dextrose Oral Gel 15 Gram(s) Oral once PRN Blood Glucose LESS THAN 70 milliGRAM(s)/deciliter      Allergies    Alcohol (Hives; Angioedema)  No Known Drug Allergies    Intolerances        Vital Signs Last 24 Hrs  T(C): 36.8 (07 Jun 2024 04:40), Max: 36.8 (07 Jun 2024 04:40)  T(F): 98.3 (07 Jun 2024 04:40), Max: 98.3 (07 Jun 2024 04:40)  HR: 65 (07 Jun 2024 04:40) (65 - 79)  BP: 170/71 (07 Jun 2024 04:40) (157/74 - 170/71)  BP(mean): --  RR: 18 (07 Jun 2024 04:40) (18 - 19)  SpO2: 100% (07 Jun 2024 04:40) (96% - 100%)    Parameters below as of 07 Jun 2024 04:40  Patient On (Oxygen Delivery Method): room air          LABS:                          13.3   36.86 )-----------( 151      ( 06 Jun 2024 08:43 )             40.2         Mean Cell Volume : 83.1 fL  Mean Cell Hemoglobin : 27.5 pg  Mean Cell Hemoglobin Concentration : 33.1 g/dL  Auto Neutrophil # : 2.53 K/uL  Auto Lymphocyte # : 32.61 K/uL  Auto Monocyte # : 1.35 K/uL  Auto Eosinophil # : 0.23 K/uL  Auto Basophil # : 0.10 K/uL  Auto Neutrophil % : 6.8 %  Auto Lymphocyte % : 88.5 %  Auto Monocyte % : 3.7 %  Auto Eosinophil % : 0.6 %  Auto Basophil % : 0.3 %      Serial CBC's  06-06 @ 08:43  Hct-40.2 / Hgb-13.3 / Plat-151 / RBC-4.84 / WBC-36.86  Serial CBC's  06-05 @ 08:17  Hct-38.5 / Hgb-12.5 / Plat-134 / RBC-4.59 / WBC-30.90  Serial CBC's  06-04 @ 17:25  Hct-38.8 / Hgb-12.9 / Plat-145 / RBC-4.66 / WBC-34.73      06-06    135  |  100  |  15  ----------------------------<  216<H>  4.6   |  26  |  0.5<L>    Ca    8.6      06 Jun 2024 08:43  Phos  3.2     06-05  Mg     1.8     06-05    TPro  7.0  /  Alb  3.6  /  TBili  0.2  /  DBili  x   /  AST  33  /  ALT  35  /  AlkPhos  133<H>  06-06                      BLOOD SMEAR INTERPRETATION:       RADIOLOGY & ADDITIONAL STUDIES:

## 2024-06-07 NOTE — DISCHARGE NOTE NURSING/CASE MANAGEMENT/SOCIAL WORK - NSDCPEFALRISK_GEN_ALL_CORE
For information on Fall & Injury Prevention, visit: [email or web address] OR  [email or web address] OR  https://www.cdc.gov/steadi/patient.html

## 2024-06-07 NOTE — DISCHARGE NOTE PROVIDER - NSDCCPCAREPLAN_GEN_ALL_CORE_FT
PRINCIPAL DISCHARGE DIAGNOSIS  Diagnosis: Gait instability  Assessment and Plan of Treatment:       SECONDARY DISCHARGE DIAGNOSES  Diagnosis: Weakness  Assessment and Plan of Treatment:     Diagnosis: Hyponatremia  Assessment and Plan of Treatment:     Diagnosis: Leukocytosis  Assessment and Plan of Treatment:

## 2024-06-07 NOTE — DISCHARGE NOTE NURSING/CASE MANAGEMENT/SOCIAL WORK - PATIENT PORTAL LINK FT
You can access the FollowMyHealth Patient Portal offered by [facility] by registering at the following website: [email or web address]. By joining [employer]s FollowMyHealth portal, you will also be able to view your health information using other applications (apps) compatible with our system.

## 2024-06-07 NOTE — DISCHARGE NOTE PROVIDER - NSDCMRMEDTOKEN_GEN_ALL_CORE_FT
atorvastatin 20 mg oral tablet: 1 tab(s) orally once a day   Coreg 6.25 mg oral tablet: 1 tab(s) orally once a day  empagliflozin 10 mg oral tablet: 1 tab(s) orally  losartan 50 mg oral tablet: 1 tab(s) orally 2 times a day MDD: 30  meclizine 12.5 mg oral tablet: 1 tab(s) orally 2 times a day as needed for  dizziness  metFORMIN 850 mg oral tablet: 1 tab(s) orally 2 times a day  NIFEdipine 60 mg oral tablet, extended release: 1 tab(s) orally once a day   atorvastatin 20 mg oral tablet: 1 tab(s) orally once a day   Coreg 6.25 mg oral tablet: 1 tab(s) orally once a day  empagliflozin 10 mg oral tablet: 1 tab(s) orally once a day  losartan 50 mg oral tablet: 1 tab(s) orally once a day (at bedtime) MDD: 30  meclizine 12.5 mg oral tablet: 1 tab(s) orally 2 times a day as needed for  dizziness  metFORMIN 850 mg oral tablet: 1 tab(s) orally 2 times a day  NIFEdipine 60 mg oral tablet, extended release: 1 tab(s) orally once a day

## 2024-06-13 DIAGNOSIS — Z91.81 HISTORY OF FALLING: ICD-10-CM

## 2024-06-13 DIAGNOSIS — M48.55XA COLLAPSED VERTEBRA, NOT ELSEWHERE CLASSIFIED, THORACOLUMBAR REGION, INITIAL ENCOUNTER FOR FRACTURE: ICD-10-CM

## 2024-06-13 DIAGNOSIS — Z79.84 LONG TERM (CURRENT) USE OF ORAL HYPOGLYCEMIC DRUGS: ICD-10-CM

## 2024-06-13 DIAGNOSIS — R42 DIZZINESS AND GIDDINESS: ICD-10-CM

## 2024-06-13 DIAGNOSIS — E11.65 TYPE 2 DIABETES MELLITUS WITH HYPERGLYCEMIA: ICD-10-CM

## 2024-06-13 DIAGNOSIS — Z96.649 PRESENCE OF UNSPECIFIED ARTIFICIAL HIP JOINT: ICD-10-CM

## 2024-06-13 DIAGNOSIS — I10 ESSENTIAL (PRIMARY) HYPERTENSION: ICD-10-CM

## 2024-06-13 DIAGNOSIS — E87.1 HYPO-OSMOLALITY AND HYPONATREMIA: ICD-10-CM

## 2024-06-13 DIAGNOSIS — D72.828 OTHER ELEVATED WHITE BLOOD CELL COUNT: ICD-10-CM

## 2024-09-03 ENCOUNTER — INPATIENT (INPATIENT)
Facility: HOSPITAL | Age: 89
LOS: 6 days | Discharge: HOME | DRG: 194 | End: 2024-09-10
Attending: STUDENT IN AN ORGANIZED HEALTH CARE EDUCATION/TRAINING PROGRAM | Admitting: HOSPITALIST
Payer: MEDICAID

## 2024-09-03 VITALS
HEART RATE: 85 BPM | SYSTOLIC BLOOD PRESSURE: 174 MMHG | RESPIRATION RATE: 20 BRPM | TEMPERATURE: 97 F | DIASTOLIC BLOOD PRESSURE: 90 MMHG | OXYGEN SATURATION: 98 %

## 2024-09-03 DIAGNOSIS — Z96.649 PRESENCE OF UNSPECIFIED ARTIFICIAL HIP JOINT: Chronic | ICD-10-CM

## 2024-09-03 DIAGNOSIS — I50.9 HEART FAILURE, UNSPECIFIED: ICD-10-CM

## 2024-09-03 PROBLEM — R42 DIZZINESS AND GIDDINESS: Chronic | Status: ACTIVE | Noted: 2024-06-04

## 2024-09-03 LAB
ALBUMIN SERPL ELPH-MCNC: 3.2 G/DL — LOW (ref 3.5–5.2)
ALBUMIN SERPL ELPH-MCNC: 3.5 G/DL — SIGNIFICANT CHANGE UP (ref 3.5–5.2)
ALP SERPL-CCNC: 141 U/L — HIGH (ref 30–115)
ALP SERPL-CCNC: 144 U/L — HIGH (ref 30–115)
ALT FLD-CCNC: 38 U/L — SIGNIFICANT CHANGE UP (ref 0–41)
ALT FLD-CCNC: 43 U/L — HIGH (ref 0–41)
ANION GAP SERPL CALC-SCNC: 10 MMOL/L — SIGNIFICANT CHANGE UP (ref 7–14)
ANION GAP SERPL CALC-SCNC: 10 MMOL/L — SIGNIFICANT CHANGE UP (ref 7–14)
ANION GAP SERPL CALC-SCNC: 12 MMOL/L — SIGNIFICANT CHANGE UP (ref 7–14)
AST SERPL-CCNC: 44 U/L — HIGH (ref 0–41)
AST SERPL-CCNC: 62 U/L — HIGH (ref 0–41)
BASE EXCESS BLDV CALC-SCNC: -4.5 MMOL/L — LOW (ref -2–3)
BASOPHILS # BLD AUTO: 0 K/UL — SIGNIFICANT CHANGE UP (ref 0–0.2)
BASOPHILS NFR BLD AUTO: 0 % — SIGNIFICANT CHANGE UP (ref 0–1)
BILIRUB SERPL-MCNC: 0.4 MG/DL — SIGNIFICANT CHANGE UP (ref 0.2–1.2)
BILIRUB SERPL-MCNC: 0.4 MG/DL — SIGNIFICANT CHANGE UP (ref 0.2–1.2)
BUN SERPL-MCNC: 21 MG/DL — HIGH (ref 10–20)
BUN SERPL-MCNC: 22 MG/DL — HIGH (ref 10–20)
BUN SERPL-MCNC: 27 MG/DL — HIGH (ref 10–20)
CA-I SERPL-SCNC: 1.04 MMOL/L — LOW (ref 1.15–1.33)
CALCIUM SERPL-MCNC: 8.5 MG/DL — SIGNIFICANT CHANGE UP (ref 8.4–10.5)
CHLORIDE SERPL-SCNC: 88 MMOL/L — LOW (ref 98–110)
CHLORIDE SERPL-SCNC: 92 MMOL/L — LOW (ref 98–110)
CHLORIDE SERPL-SCNC: 92 MMOL/L — LOW (ref 98–110)
CO2 SERPL-SCNC: 20 MMOL/L — SIGNIFICANT CHANGE UP (ref 17–32)
CO2 SERPL-SCNC: 22 MMOL/L — SIGNIFICANT CHANGE UP (ref 17–32)
CO2 SERPL-SCNC: 23 MMOL/L — SIGNIFICANT CHANGE UP (ref 17–32)
CREAT SERPL-MCNC: 0.5 MG/DL — LOW (ref 0.7–1.5)
CREAT SERPL-MCNC: 0.6 MG/DL — LOW (ref 0.7–1.5)
CREAT SERPL-MCNC: 0.8 MG/DL — SIGNIFICANT CHANGE UP (ref 0.7–1.5)
DACRYOCYTES BLD QL SMEAR: SLIGHT — SIGNIFICANT CHANGE UP
EGFR: 71 ML/MIN/1.73M2 — SIGNIFICANT CHANGE UP
EGFR: 87 ML/MIN/1.73M2 — SIGNIFICANT CHANGE UP
EGFR: 91 ML/MIN/1.73M2 — SIGNIFICANT CHANGE UP
EOSINOPHIL NFR BLD AUTO: 0 % — SIGNIFICANT CHANGE UP (ref 0–8)
FLUAV AG NPH QL: SIGNIFICANT CHANGE UP
FLUBV AG NPH QL: SIGNIFICANT CHANGE UP
GAS PNL BLDV: 120 MMOL/L — CRITICAL LOW (ref 136–145)
GAS PNL BLDV: SIGNIFICANT CHANGE UP
GLUCOSE BLDC GLUCOMTR-MCNC: 262 MG/DL — HIGH (ref 70–99)
GLUCOSE BLDC GLUCOMTR-MCNC: 265 MG/DL — HIGH (ref 70–99)
GLUCOSE SERPL-MCNC: 239 MG/DL — HIGH (ref 70–99)
GLUCOSE SERPL-MCNC: 280 MG/DL — HIGH (ref 70–99)
GLUCOSE SERPL-MCNC: 281 MG/DL — HIGH (ref 70–99)
HCO3 BLDV-SCNC: 24 MMOL/L — SIGNIFICANT CHANGE UP (ref 22–29)
HCT VFR BLD CALC: 41.2 % — SIGNIFICANT CHANGE UP (ref 37–47)
HCT VFR BLDA CALC: 42 % — SIGNIFICANT CHANGE UP (ref 34.5–46.5)
HGB BLD CALC-MCNC: 14.1 G/DL — SIGNIFICANT CHANGE UP (ref 11.7–16.1)
HGB BLD-MCNC: 13.4 G/DL — SIGNIFICANT CHANGE UP (ref 12–16)
LACTATE BLDV-MCNC: 1.9 MMOL/L — SIGNIFICANT CHANGE UP (ref 0.5–2)
LYMPHOCYTES # BLD AUTO: 39.38 K/UL — HIGH (ref 1.2–3.4)
LYMPHOCYTES # BLD AUTO: 63 % — HIGH (ref 20.5–51.1)
MANUAL SMEAR VERIFICATION: SIGNIFICANT CHANGE UP
MCHC RBC-ENTMCNC: 26.7 PG — LOW (ref 27–31)
MCHC RBC-ENTMCNC: 32.5 G/DL — SIGNIFICANT CHANGE UP (ref 32–37)
MCV RBC AUTO: 82.1 FL — SIGNIFICANT CHANGE UP (ref 81–99)
MONOCYTES # BLD AUTO: 1.25 K/UL — HIGH (ref 0.1–0.6)
MONOCYTES NFR BLD AUTO: 2 % — SIGNIFICANT CHANGE UP (ref 1.7–9.3)
NEUTROPHILS # BLD AUTO: 11.88 K/UL — HIGH (ref 1.4–6.5)
NEUTROPHILS NFR BLD AUTO: 19 % — LOW (ref 42.2–75.2)
NRBC # BLD: 0 /100 WBCS — SIGNIFICANT CHANGE UP (ref 0–0)
NRBC # BLD: SIGNIFICANT CHANGE UP /100 WBCS (ref 0–0)
NT-PROBNP SERPL-SCNC: HIGH PG/ML (ref 0–300)
OVALOCYTES BLD QL SMEAR: SLIGHT — SIGNIFICANT CHANGE UP
PCO2 BLDV: 55 MMHG — HIGH (ref 39–42)
PH BLDV: 7.24 — LOW (ref 7.32–7.43)
PLAT MORPH BLD: NORMAL — SIGNIFICANT CHANGE UP
PLATELET # BLD AUTO: 453 K/UL — HIGH (ref 130–400)
PLATELET CLUMP BLD QL SMEAR: ABNORMAL
PLATELET COUNT - ESTIMATE: ABNORMAL
PMV BLD: 8.6 FL — SIGNIFICANT CHANGE UP (ref 7.4–10.4)
PO2 BLDV: 11 MMHG — LOW (ref 25–45)
POIKILOCYTOSIS BLD QL AUTO: SLIGHT — SIGNIFICANT CHANGE UP
POTASSIUM BLDV-SCNC: 4.8 MMOL/L — SIGNIFICANT CHANGE UP (ref 3.5–5.1)
POTASSIUM SERPL-MCNC: 4.6 MMOL/L — SIGNIFICANT CHANGE UP (ref 3.5–5)
POTASSIUM SERPL-MCNC: 5.1 MMOL/L — HIGH (ref 3.5–5)
POTASSIUM SERPL-MCNC: 5.8 MMOL/L — HIGH (ref 3.5–5)
POTASSIUM SERPL-SCNC: 4.6 MMOL/L — SIGNIFICANT CHANGE UP (ref 3.5–5)
POTASSIUM SERPL-SCNC: 5.1 MMOL/L — HIGH (ref 3.5–5)
POTASSIUM SERPL-SCNC: 5.8 MMOL/L — HIGH (ref 3.5–5)
PROT SERPL-MCNC: 6.2 G/DL — SIGNIFICANT CHANGE UP (ref 6–8)
PROT SERPL-MCNC: 6.8 G/DL — SIGNIFICANT CHANGE UP (ref 6–8)
RBC # BLD: 5.02 M/UL — SIGNIFICANT CHANGE UP (ref 4.2–5.4)
RBC # FLD: 15.7 % — HIGH (ref 11.5–14.5)
RBC BLD AUTO: ABNORMAL
RSV RNA NPH QL NAA+NON-PROBE: SIGNIFICANT CHANGE UP
SAO2 % BLDV: 11.1 % — LOW (ref 67–88)
SARS-COV-2 RNA SPEC QL NAA+PROBE: SIGNIFICANT CHANGE UP
SODIUM SERPL-SCNC: 120 MMOL/L — LOW (ref 135–146)
SODIUM SERPL-SCNC: 124 MMOL/L — LOW (ref 135–146)
SODIUM SERPL-SCNC: 125 MMOL/L — LOW (ref 135–146)
TROPONIN T, HIGH SENSITIVITY RESULT: 46 NG/L — HIGH (ref 6–13)
TROPONIN T, HIGH SENSITIVITY RESULT: 55 NG/L — CRITICAL HIGH (ref 6–13)
TROPONIN T, HIGH SENSITIVITY RESULT: 58 NG/L — CRITICAL HIGH (ref 6–13)
VARIANT LYMPHS # BLD: 16 % — HIGH (ref 0–5)
WBC # BLD: 62.5 K/UL — CRITICAL HIGH (ref 4.8–10.8)
WBC # FLD AUTO: 62.5 K/UL — CRITICAL HIGH (ref 4.8–10.8)

## 2024-09-03 PROCEDURE — 99291 CRITICAL CARE FIRST HOUR: CPT

## 2024-09-03 PROCEDURE — C1887: CPT

## 2024-09-03 PROCEDURE — 93306 TTE W/DOPPLER COMPLETE: CPT

## 2024-09-03 PROCEDURE — 97116 GAIT TRAINING THERAPY: CPT | Mod: GP

## 2024-09-03 PROCEDURE — 83935 ASSAY OF URINE OSMOLALITY: CPT

## 2024-09-03 PROCEDURE — C1889: CPT

## 2024-09-03 PROCEDURE — 99223 1ST HOSP IP/OBS HIGH 75: CPT

## 2024-09-03 PROCEDURE — 82962 GLUCOSE BLOOD TEST: CPT

## 2024-09-03 PROCEDURE — 93460 R&L HRT ART/VENTRICLE ANGIO: CPT

## 2024-09-03 PROCEDURE — A9500: CPT

## 2024-09-03 PROCEDURE — 71045 X-RAY EXAM CHEST 1 VIEW: CPT | Mod: 26

## 2024-09-03 PROCEDURE — 97110 THERAPEUTIC EXERCISES: CPT | Mod: GP

## 2024-09-03 PROCEDURE — 93925 LOWER EXTREMITY STUDY: CPT

## 2024-09-03 PROCEDURE — 85027 COMPLETE CBC AUTOMATED: CPT

## 2024-09-03 PROCEDURE — 84300 ASSAY OF URINE SODIUM: CPT

## 2024-09-03 PROCEDURE — 36415 COLL VENOUS BLD VENIPUNCTURE: CPT

## 2024-09-03 PROCEDURE — 93005 ELECTROCARDIOGRAM TRACING: CPT

## 2024-09-03 PROCEDURE — 80048 BASIC METABOLIC PNL TOTAL CA: CPT

## 2024-09-03 PROCEDURE — 83036 HEMOGLOBIN GLYCOSYLATED A1C: CPT

## 2024-09-03 PROCEDURE — 78452 HT MUSCLE IMAGE SPECT MULT: CPT | Mod: MC

## 2024-09-03 PROCEDURE — 85025 COMPLETE CBC W/AUTO DIFF WBC: CPT

## 2024-09-03 PROCEDURE — 84484 ASSAY OF TROPONIN QUANT: CPT

## 2024-09-03 PROCEDURE — 99497 ADVNCD CARE PLAN 30 MIN: CPT | Mod: 25

## 2024-09-03 PROCEDURE — 80053 COMPREHEN METABOLIC PANEL: CPT

## 2024-09-03 PROCEDURE — C1769: CPT

## 2024-09-03 PROCEDURE — 93010 ELECTROCARDIOGRAM REPORT: CPT | Mod: 76

## 2024-09-03 PROCEDURE — 85610 PROTHROMBIN TIME: CPT

## 2024-09-03 PROCEDURE — 97162 PT EVAL MOD COMPLEX 30 MIN: CPT | Mod: GP

## 2024-09-03 PROCEDURE — 83930 ASSAY OF BLOOD OSMOLALITY: CPT

## 2024-09-03 PROCEDURE — 83735 ASSAY OF MAGNESIUM: CPT

## 2024-09-03 PROCEDURE — C1894: CPT

## 2024-09-03 PROCEDURE — 85730 THROMBOPLASTIN TIME PARTIAL: CPT

## 2024-09-03 PROCEDURE — 93017 CV STRESS TEST TRACING ONLY: CPT

## 2024-09-03 RX ORDER — CARVEDILOL 6.25 MG/1
3.12 TABLET ORAL EVERY 12 HOURS
Refills: 0 | Status: DISCONTINUED | OUTPATIENT
Start: 2024-09-03 | End: 2024-09-10

## 2024-09-03 RX ORDER — SITAGLIPTIN AND METFORMIN HYDROCHLORIDE 500; 50 MG/1; MG/1
1 TABLET, FILM COATED ORAL
Refills: 0 | DISCHARGE

## 2024-09-03 RX ORDER — DEXTROSE 15 G/33 G
25 GEL IN PACKET (GRAM) ORAL ONCE
Refills: 0 | Status: DISCONTINUED | OUTPATIENT
Start: 2024-09-03 | End: 2024-09-10

## 2024-09-03 RX ORDER — ACARBOSE 50 MG/1
1 TABLET ORAL
Refills: 0 | DISCHARGE

## 2024-09-03 RX ORDER — INSULIN GLARGINE 100 [IU]/ML
10 INJECTION, SOLUTION SUBCUTANEOUS AT BEDTIME
Refills: 0 | Status: DISCONTINUED | OUTPATIENT
Start: 2024-09-03 | End: 2024-09-07

## 2024-09-03 RX ORDER — MAGNESIUM, ALUMINUM HYDROXIDE 200-225/5
30 SUSPENSION, ORAL (FINAL DOSE FORM) ORAL EVERY 4 HOURS
Refills: 0 | Status: DISCONTINUED | OUTPATIENT
Start: 2024-09-03 | End: 2024-09-10

## 2024-09-03 RX ORDER — LOSARTAN POTASSIUM 50 MG/1
100 TABLET ORAL DAILY
Refills: 0 | Status: DISCONTINUED | OUTPATIENT
Start: 2024-09-03 | End: 2024-09-10

## 2024-09-03 RX ORDER — DEXTROSE 15 G/33 G
15 GEL IN PACKET (GRAM) ORAL ONCE
Refills: 0 | Status: DISCONTINUED | OUTPATIENT
Start: 2024-09-03 | End: 2024-09-10

## 2024-09-03 RX ORDER — ONDANSETRON 2 MG/ML
4 INJECTION, SOLUTION INTRAMUSCULAR; INTRAVENOUS EVERY 8 HOURS
Refills: 0 | Status: DISCONTINUED | OUTPATIENT
Start: 2024-09-03 | End: 2024-09-10

## 2024-09-03 RX ORDER — FUROSEMIDE 40 MG
40 TABLET ORAL EVERY 12 HOURS
Refills: 0 | Status: DISCONTINUED | OUTPATIENT
Start: 2024-09-03 | End: 2024-09-03

## 2024-09-03 RX ORDER — DEXTROSE 15 G/33 G
12.5 GEL IN PACKET (GRAM) ORAL ONCE
Refills: 0 | Status: DISCONTINUED | OUTPATIENT
Start: 2024-09-03 | End: 2024-09-10

## 2024-09-03 RX ORDER — CARVEDILOL 6.25 MG/1
1 TABLET ORAL
Refills: 0 | DISCHARGE

## 2024-09-03 RX ORDER — ENOXAPARIN SODIUM 100 MG/ML
40 INJECTION SUBCUTANEOUS EVERY 24 HOURS
Refills: 0 | Status: DISCONTINUED | OUTPATIENT
Start: 2024-09-03 | End: 2024-09-08

## 2024-09-03 RX ORDER — CHLORHEXIDINE GLUCONATE 40 MG/ML
1 SOLUTION TOPICAL
Refills: 0 | Status: DISCONTINUED | OUTPATIENT
Start: 2024-09-03 | End: 2024-09-10

## 2024-09-03 RX ORDER — FUROSEMIDE 40 MG
60 TABLET ORAL ONCE
Refills: 0 | Status: COMPLETED | OUTPATIENT
Start: 2024-09-03 | End: 2024-09-03

## 2024-09-03 RX ORDER — EMPAGLIFLOZIN 10 MG/1
1 TABLET, FILM COATED ORAL
Refills: 0 | DISCHARGE

## 2024-09-03 RX ORDER — GLUCAGON INJECTION, SOLUTION 1 MG/.2ML
1 INJECTION, SOLUTION SUBCUTANEOUS ONCE
Refills: 0 | Status: DISCONTINUED | OUTPATIENT
Start: 2024-09-03 | End: 2024-09-10

## 2024-09-03 RX ORDER — FUROSEMIDE 40 MG
40 TABLET ORAL DAILY
Refills: 0 | Status: DISCONTINUED | OUTPATIENT
Start: 2024-09-03 | End: 2024-09-06

## 2024-09-03 RX ORDER — ACETAMINOPHEN 325 MG/1
650 TABLET ORAL EVERY 6 HOURS
Refills: 0 | Status: DISCONTINUED | OUTPATIENT
Start: 2024-09-03 | End: 2024-09-10

## 2024-09-03 RX ADMIN — Medication 20 MILLIGRAM(S): at 21:46

## 2024-09-03 RX ADMIN — Medication 40 MILLIGRAM(S): at 18:14

## 2024-09-03 RX ADMIN — Medication 60 MILLIGRAM(S): at 11:37

## 2024-09-03 RX ADMIN — INSULIN GLARGINE 10 UNIT(S): 100 INJECTION, SOLUTION SUBCUTANEOUS at 21:47

## 2024-09-03 RX ADMIN — CARVEDILOL 3.12 MILLIGRAM(S): 6.25 TABLET ORAL at 18:14

## 2024-09-03 RX ADMIN — ENOXAPARIN SODIUM 40 MILLIGRAM(S): 100 INJECTION SUBCUTANEOUS at 18:15

## 2024-09-03 RX ADMIN — Medication 3: at 16:49

## 2024-09-03 NOTE — H&P ADULT - ASSESSMENT
88F w/PMHx of HTN, DM2, HLD presents to ED with SOB, admitted to medicine for further management.    #AHRF 2/2 volume overload 2/2 presumed CHF  #Elevated Troponin/CP, R/O ACS  - admit to medicine service  - monitor on telemetry  - supplemental o2 to maintain adequate saturations  - Furosemide 40mg IVP q12h  - check TTE  - weights QD, monitor I/O's  - Cards c/s  - HS-Troponin 46 ->55; re-check Troponin at 1900  - EKG in AM    #Hyponatremia  - measured sOsmo 269, likely hypotonic hypervolemic  - recheck BMP now and at 2200 and AM  - Fluid Restrict 2L/day  - check Юлия+/uOsmo/sOsmo    #Chronic Leukocytosis  - Chronic, follows with Laura in [facility], not under active Rx  - seen by Laura 6/24 in hospital, outpatient FU    #DM2  - hold PO meds  - c/w Lantus 10u and ISS  - CHO diet    #HTN  - c/w home meds  - monitor BP  - DASH Diet    Diet: CHO/DASH  Activity: OOB  DVT PPX: Lovenox  Code status: Full  Dispo: Home  CHG 2% Wipes QD  88F w/PMHx of HTN, DM2, HLD presents to ED with SOB, admitted to medicine for further management.    #AHRF 2/2 volume overload 2/2 presumed CHF  #Elevated Troponin/CP, R/O ACS  - admit to medicine service  - monitor on telemetry  - supplemental o2 to maintain adequate saturations  - Furosemide 40mg IVP q24 hrs  - check TTE  - weights QD, monitor I/O's  - Cards c/s  - HS-Troponin 46 ->55; re-check Troponin at 1900  - EKG in AM    #Hyponatremia  - measured sOsmo 269, likely hypotonic hypervolemic  - recheck BMP now and at 2200 and AM  - Fluid Restrict 2L/day  - check Юлия+/uOsmo/sOsmo    #Chronic Leukocytosis  - Chronic, follows with Laura in [facility], not under active Rx  - seen by Laura 6/24 in hospital, outpatient FU    #DM2  - hold PO meds  - c/w Lantus 10u and ISS  - CHO diet    #HTN  - c/w home meds  - monitor BP  - DASH Diet    Diet: CHO/DASH  Activity: OOB  DVT PPX: Lovenox  Code status: Full  Dispo: Home  CHG 2% Wipes QD

## 2024-09-03 NOTE — ED ADULT NURSE NOTE - CHIEF COMPLAINT QUOTE
[FreeTextEntry1] : 23F who returns for nasal congestion, found to have turbinate hypertrophy. \par \par Plan:\par - in office laser ablation of inferior turbinates and swell bodies\par - pre-procedure COVID\par 
BIBA for SOB, on RA EMS states spO2 92%

## 2024-09-03 NOTE — ED ADULT NURSE NOTE - NSFALLRISKINTERV_ED_ALL_ED

## 2024-09-03 NOTE — H&P ADULT - HISTORY OF PRESENT ILLNESS
88F w/PMHx of HTN, DM2, HLD presents to ED with SOB.  Dtr translates reports patient with worsening anxiety x 1-2 days, this AM woke up SOB at rest so activated EMS for transport to ED.  Dtr reports patient developed CP/neck pain described as constant and pressure type on EMS arrival and resolved approx 30m later with o2 administration.  Reports worsening edema over the past 2-3 months, treated with PO Furosemide for a week but stopped.  No abdominal or urinary complaints.  No PND/Orthopnea.  No fever/chills.

## 2024-09-03 NOTE — CONSULT NOTE ADULT - SUBJECTIVE AND OBJECTIVE BOX
HPI:  88F w/PMHx of HTN, DM2, HLD presents to ED with SOB.  Dtr translates reports patient with worsening anxiety x 1-2 days, this AM woke up SOB at rest so activated EMS for transport to ED.  Dtr reports patient developed CP/neck pain described as constant and pressure type on EMS arrival and resolved approx 30m later with o2 administration.  Reports worsening edema over the past 2-3 months, treated with PO Furosemide for a week but stopped.  No abdominal or urinary complaints.  No PND/Orthopnea.  No fever/chills.   (03 Sep 2024 15:37)      PAST MEDICAL & SURGICAL HISTORY  HTN (hypertension)    Diabetes    Vertigo    Status post hip replacement        FAMILY HISTORY:  FAMILY HISTORY:      SOCIAL HISTORY:  []smoker  []Alcohol  []Drug    ALLERGIES:  Alcohol (Hives; Angioedema)  No Known Drug Allergies      MEDICATIONS:  MEDICATIONS  (STANDING):  atorvastatin 20 milliGRAM(s) Oral at bedtime  carvedilol 3.125 milliGRAM(s) Oral every 12 hours  chlorhexidine 2% Cloths 1 Application(s) Topical <User Schedule>  dextrose 5%. 1000 milliLiter(s) (50 mL/Hr) IV Continuous <Continuous>  dextrose 5%. 1000 milliLiter(s) (100 mL/Hr) IV Continuous <Continuous>  dextrose 50% Injectable 25 Gram(s) IV Push once  dextrose 50% Injectable 12.5 Gram(s) IV Push once  dextrose 50% Injectable 25 Gram(s) IV Push once  enoxaparin Injectable 40 milliGRAM(s) SubCutaneous every 24 hours  furosemide   Injectable 40 milliGRAM(s) IV Push every 12 hours  glucagon  Injectable 1 milliGRAM(s) IntraMuscular once  insulin glargine Injectable (LANTUS) 10 Unit(s) SubCutaneous at bedtime  insulin lispro (ADMELOG) corrective regimen sliding scale   SubCutaneous three times a day before meals  losartan 100 milliGRAM(s) Oral daily    MEDICATIONS  (PRN):  acetaminophen     Tablet .. 650 milliGRAM(s) Oral every 6 hours PRN Temp greater or equal to 38C (100.4F), Mild Pain (1 - 3)  aluminum hydroxide/magnesium hydroxide/simethicone Suspension 30 milliLiter(s) Oral every 4 hours PRN Dyspepsia  dextrose Oral Gel 15 Gram(s) Oral once PRN Blood Glucose LESS THAN 70 milliGRAM(s)/deciliter  melatonin 3 milliGRAM(s) Oral at bedtime PRN Insomnia  ondansetron Injectable 4 milliGRAM(s) IV Push every 8 hours PRN Nausea and/or Vomiting      HOME MEDICATIONS:  Home Medications:  acarbose 50 mg oral tablet: 1 tab(s) orally 3 times a day (03 Sep 2024 15:42)  carvedilol 3.125 mg oral tablet: 1 tab(s) orally 2 times a day (03 Sep 2024 15:42)  Janumet 50 mg-500 mg oral tablet: 1 tab(s) orally once a day (03 Sep 2024 15:43)  Jardiance 25 mg oral tablet: 1 tab(s) orally once a day (03 Sep 2024 15:43)  Lantus 100 units/mL subcutaneous solution: 10 unit(s) subcutaneous once a day (at bedtime) (03 Sep 2024 15:43)  losartan 100 mg oral tablet: 1 tab(s) orally once a day (03 Sep 2024 15:43)      VITALS:   T(F): 96.6 (09-03 @ 16:45), Max: 97.2 (09-03 @ 11:03)  HR: 71 (09-03 @ 16:45) (71 - 85)  BP: 128/66 (09-03 @ 16:45) (128/66 - 228/125)  BP(mean): --  RR: 18 (09-03 @ 16:45) (18 - 24)  SpO2: 98% (09-03 @ 16:45) (96% - 100%)    I&O's Summary      REVIEW OF SYSTEMS:  CONSTITUTIONAL: No weakness, fevers or chills  EYES: No visual changes  ENT: No vertigo or throat pain   NECK: No pain or stiffness  RESPIRATORY: No cough, wheezing, hemoptysis; No shortness of breath  CARDIOVASCULAR: No chest pain or palpitations  GASTROINTESTINAL: No abdominal or epigastric pain. No nausea, vomiting, or hematemesis; No diarrhea or constipation. No melena or hematochezia.  GENITOURINARY: No dysuria, frequency or hematuria  NEUROLOGICAL: No numbness or weakness  SKIN: No itching, no rashes  MSK: no    PHYSICAL EXAM:  NEURO: patient is awake, alert and oriented  GEN: Not in acute distress/ c/o B/L foot pain  NECK: no thyroid enlargement, no JVD  LUNGS: bilateral diminished   CARDIOVASCULAR: S1/S2 present, RRR , no murmurs or rubs, no carotid bruits,  + PP bilaterally  ABD: Soft, non-tender, non-distended, +BS  EXT: (+) B/L LLE  SKIN: Intact    LABS:                        13.4   62.50 )-----------( 453      ( 03 Sep 2024 11:20 )             41.2     09-03    120<L>  |  88<L>  |  22<H>  ----------------------------<  280<H>  5.8<H>   |  20  |  0.6<L>    Ca    8.5      03 Sep 2024 11:20    TPro  6.8  /  Alb  3.5  /  TBili  0.4  /  DBili  x   /  AST  62<H>  /  ALT  43<H>  /  AlkPhos  144<H>  09-03              Troponin trend: 46; 55            RADIOLOGY:  -CXR:  < from: Xray Chest 1 View-PORTABLE IMMEDIATE (09.03.24 @ 11:38) >  Impression:    Bilateral opacities and effusions.        --- End of Report ---            RICARDO MCCALL MD; Attending Radiologist  This document has been electronically signed. Sep  3 2024 12:08PM    < end of copied text >      ECG:  < from: 12 Lead ECG (09.03.24 @ 14:47) >  Ventricular Rate 78 BPM    Atrial Rate 78 BPM    P-R Interval 146 ms    QRS Duration 76 ms    Q-T Interval 390 ms    QTC Calculation(Bazett) 444 ms    P Axis 36 degrees    R Axis -63 degrees    T Axis 47 degrees    Diagnosis Line Normal sinus rhythm  Possible Left atrial enlargement  Left anterior fascicular block  Septal infarct , age undetermined  Possible Lateral infarct , age undetermined  Abnormal ECG    Confirmed by Sanket España ([id number]) on 9/3/2024 3:26:34 PM    < end of copied text >      TELEMETRY EVENTS:  None at this time

## 2024-09-03 NOTE — H&P ADULT - NSHPLABSRESULTS_GEN_ALL_CORE
13.4   62.50 )-----------( 453      ( 03 Sep 2024 11:20 )             41.2       09-03    120<L>  |  88<L>  |  22<H>  ----------------------------<  280<H>  5.8<H>   |  20  |  0.6<L>    Ca    8.5      03 Sep 2024 11:20    TPro  6.8  /  Alb  3.5  /  TBili  0.4  /  DBili  x   /  AST  62<H>  /  ALT  43<H>  /  AlkPhos  144<H>  09-03          Urinalysis Basic - ( 03 Sep 2024 11:20 )    Color: x / Appearance: x / SG: x / pH: x  Gluc: 280 mg/dL / Ketone: x  / Bili: x / Urobili: x   Blood: x / Protein: x / Nitrite: x   Leuk Esterase: x / RBC: x / WBC x   Sq Epi: x / Non Sq Epi: x / Bacteria: x    CXR    Impression:    Bilateral opacities and effusions.

## 2024-09-03 NOTE — ED ADULT NURSE NOTE - OBJECTIVE STATEMENT
pt in er for complaints of shortness of breath for one day   pts daughter stated yesterday pt was very anxious, anxiety for worse today causing difficulty breathing   pt on nonrebreather upon assessment

## 2024-09-03 NOTE — H&P ADULT - NS ATTEND AMEND GEN_ALL_CORE FT
pt seen and examined - discussed with daughter bedside   on exam bilat pitting edema, cold LEs, bilat crackles    #suspected acute on chronic diastolic CHF- was only on lasix for one week then stopped, per daughter gets her to drink excessive fluids  - iv lasix 40mg daily  - i<o  -fluid restriction   -echo  -cardio eval     #troponinemia - likely type II demand ischemia - + delta in trops but then flat - cont tele monitoring , follow up with cardio    #acute hypoxic resp failure - improved from Er - off NIV - now stable on NC - cont to wean and ambulate  #DMII - insulin basal bolus  CAPILLARY BLOOD GLUCOSE      POCT Blood Glucose.: 265 mg/dL (03 Sep 2024 20:59)  POCT Blood Glucose.: 262 mg/dL (03 Sep 2024 16:21)    #hyponatremia - fluid resitrication , IV lasix  - cont to monitor bmp- improving - avoid rapid over correction   #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

## 2024-09-03 NOTE — CONSULT NOTE ADULT - NS ATTEND AMEND GEN_ALL_CORE FT
88F w/PMHx of HTN, DM2, HLD presents to ED with SOB and chest pain. Hx obtained with . No pain now, Increased WBC , Heme to seeEKG abnormal She needs a echo. She needs a adenocard thallium. Heme to see re increased wbc. Note decreased Albumin. Watch lytes . PO lasix soon. To get Lexiscan

## 2024-09-03 NOTE — ED PROVIDER NOTE - CLINICAL SUMMARY MEDICAL DECISION MAKING FREE TEXT BOX
88-year-old female history of diabetes hypertension dyslipidemia here for evaluation of shortness of breath leg swelling for the past 2 days.  No fever or chills.  Of note patient stopped taking her Lasix 2 months ago.  Here initially evaluation patient tachypneic in mild respiratory distress bilateral crackles present abdomen soft nontender S1-S2 bilateral lower extremity edema  Impression  Patient here with shortness of breath & tachypnea.  Patient placed on BiPAP to help with work of breathing.  Patient's breathing improved was able to be transitioned back off to nasal cannula.  X-ray shows bilateral pleural effusions and opacities concerning for CHF.  Initial troponin was 47 repeat troponin is 55.  Cardiology was made aware of the patient and okay with telemetry admission.  Lastly patient's white count is 62,000 but was previously 36,000 and patient has a previous blood disorder/abnormality  that is being worked up.

## 2024-09-03 NOTE — CONSULT NOTE ADULT - ASSESSMENT
HPI- Cardiology:  88F w/PMHx of HTN, DM2, HLD presents to ED with SOB.  Dtr translates and reports that patient had increasing swelling and pain of B/L LE, and had midsternal chest pain, associated with sob which is what prompted her to activate EMS.  Patient daughter states that patient had worsening edema over the last few months to which she was prescribed furosemide 20 mg.  Patient took this medication for one week, but then stated that they were told not to take the furosemide because of patients kidney function.          Pt seen and evaluated at bedside.  Cardiology consulted for volume overload, CHF, and troponemia.  Pt denies any CP, SOB, palpitations and denies any other associated cardiac symptoms.  Radiologic tests and hospital records were reviewed, as well as previous notes on this patient.  Patient does not have an outpatient cardiologist, but was told to see one by her primary md.      *Troponemia  Continue to trend trops    *Volume Overload  Obtain TTE    *B/L LE swelling   B/L ankles/ feet swollen and cold to touch  Rec: LE duplex    *WBC 62.5  Patient was seen in June by heme onc for increased WBC, maybe recall?      Monitor on telemetry  Continue home medication  TTE for LV function and WMA  Strict I/Os and standing daily weights  lasix 40mg IVP daily  Trend BMP 2/2 diuresis and treat/replete as needed, k>4, mg> 2  LE duplex  *****Incomplete Note; Attending Attestation to follow*****

## 2024-09-03 NOTE — H&P ADULT - PARTICIPANTS
Personalized Preventive Plan for Charlie Rivers - 10/25/2022  Medicare offers a range of preventive health benefits. Some of the tests and screenings are paid in full while other may be subject to a deductible, co-insurance, and/or copay. Some of these benefits include a comprehensive review of your medical history including lifestyle, illnesses that may run in your family, and various assessments and screenings as appropriate. After reviewing your medical record and screening and assessments performed today your provider may have ordered immunizations, labs, imaging, and/or referrals for you. A list of these orders (if applicable) as well as your Preventive Care list are included within your After Visit Summary for your review. Other Preventive Recommendations:    A preventive eye exam performed by an eye specialist is recommended every 1-2 years to screen for glaucoma; cataracts, macular degeneration, and other eye disorders. A preventive dental visit is recommended every 6 months. Try to get at least 150 minutes of exercise per week or 10,000 steps per day on a pedometer . Order or download the FREE \"Exercise & Physical Activity: Your Everyday Guide\" from [employer]. Call 1-205.207.5289 or search [employer] online. You need 5095-4711 mg of calcium and 7726-3998 IU of vitamin D per day. It is possible to meet your calcium requirement with diet alone, but a vitamin D supplement is usually necessary to meet this goal.  When exposed to the sun, use a sunscreen that protects against both UVA and UVB radiation with an SPF of 30 or greater. Reapply every 2 to 3 hours or after sweating, drying off with a towel, or swimming. Always wear a seat belt when traveling in a car. Always wear a helmet when riding a bicycle or motorcycle.
Family

## 2024-09-03 NOTE — ED PROVIDER NOTE - OBJECTIVE STATEMENT
89 y/o female with hx of diabetes, HTN, hyperchol presents to the ED with increasing sob and leg swelling over past few days. denies any productive cough, hemoptysis. no back pain or chest pain . patient stopped taking her lasix two months ago. no back pain . no sick contacts, fevers.

## 2024-09-03 NOTE — H&P ADULT - NSHPPHYSICALEXAM_GEN_ALL_CORE
Vital Signs (24 Hrs):  T(C): 36.2 (09-03-24 @ 11:03), Max: 36.2 (09-03-24 @ 11:03)  HR: 77 (09-03-24 @ 15:00) (72 - 85)  BP: 150/67 (09-03-24 @ 15:00) (150/67 - 228/125)  RR: 20 (09-03-24 @ 15:00) (20 - 24)  SpO2: 97% (09-03-24 @ 15:00) (96% - 100%)    PHYSICAL EXAM:  GENERAL: NAD, well-developed  SKIN: No rashes or lesions  HEAD:  Atraumatic, Normocephalic  EYES: EOMI, PERRLA, conjunctiva and sclera clear  NECK: Supple, No JVD  CHEST/LUNG: +bibasilar rales  HEART: Regular rate and rhythm; No murmurs, rubs, or gallops  ABDOMEN: Soft, Nontender, Nondistended; Bowel sounds present  EXTREMITIES:  No clubbing, cyanosis; 2+ BLE pitting edema  CNS: AAOx3

## 2024-09-03 NOTE — PATIENT PROFILE ADULT - DEAF OR HARD OF HEARING?
PCP: Stephanie Delcid no

## 2024-09-03 NOTE — ED PROVIDER NOTE - PHYSICAL EXAMINATION
generalized: normocephalic , anxious, increased work of breathing  eyes: PERRLA, EOMI, clear conjunctiva  resp: b/l rales, increased resp distress, no chest wall tenderness or crepitation  cardiac: Regular rate, regular rhythm,  S1S2, no murmurs, no extrasystoles  abdomen: NT/ND, BSx4, no CVA tenderness, no palpable mass  msk: no calf tenderness , b/l pitting edema   skin: no redness or rashes  neuro: AOx3, gait steady, speech clear, motor and sensory in tact

## 2024-09-03 NOTE — PATIENT PROFILE ADULT - FALL HARM RISK - HARM RISK INTERVENTIONS

## 2024-09-03 NOTE — ED PROVIDER NOTE - PROGRESS NOTE DETAILS
patient placed on bipap for increased work of breathing spoke with daughter regarding elevation in WBC count. patient with prior evaluation by hematology in past . patient with baseline WBC 30s. patient improved. blood pressure 150/70, decreased work of breathing. will trial off bipap spoke with cardiology . will admit to tele to trend troponin

## 2024-09-03 NOTE — ED PROVIDER NOTE - CARE PLAN
Principal Discharge DX:	Acute CHF  Secondary Diagnosis:	Hyponatremia  Secondary Diagnosis:	Elevated WBCs   1

## 2024-09-04 ENCOUNTER — RESULT REVIEW (OUTPATIENT)
Age: 89
End: 2024-09-04

## 2024-09-04 LAB
A1C WITH ESTIMATED AVERAGE GLUCOSE RESULT: 8.6 % — HIGH (ref 4–5.6)
ALBUMIN SERPL ELPH-MCNC: 3.1 G/DL — LOW (ref 3.5–5.2)
ALP SERPL-CCNC: 125 U/L — HIGH (ref 30–115)
ALT FLD-CCNC: 56 U/L — HIGH (ref 0–41)
ANION GAP SERPL CALC-SCNC: 8 MMOL/L — SIGNIFICANT CHANGE UP (ref 7–14)
AST SERPL-CCNC: 55 U/L — HIGH (ref 0–41)
BASOPHILS # BLD AUTO: 0.16 K/UL — SIGNIFICANT CHANGE UP (ref 0–0.2)
BASOPHILS NFR BLD AUTO: 0.3 % — SIGNIFICANT CHANGE UP (ref 0–1)
BILIRUB SERPL-MCNC: 0.2 MG/DL — SIGNIFICANT CHANGE UP (ref 0.2–1.2)
BUN SERPL-MCNC: 28 MG/DL — HIGH (ref 10–20)
CALCIUM SERPL-MCNC: 8.6 MG/DL — SIGNIFICANT CHANGE UP (ref 8.4–10.5)
CHLORIDE SERPL-SCNC: 93 MMOL/L — LOW (ref 98–110)
CO2 SERPL-SCNC: 26 MMOL/L — SIGNIFICANT CHANGE UP (ref 17–32)
CREAT SERPL-MCNC: 0.8 MG/DL — SIGNIFICANT CHANGE UP (ref 0.7–1.5)
EGFR: 71 ML/MIN/1.73M2 — SIGNIFICANT CHANGE UP
EOSINOPHIL # BLD AUTO: 0.11 K/UL — SIGNIFICANT CHANGE UP (ref 0–0.7)
EOSINOPHIL NFR BLD AUTO: 0.2 % — SIGNIFICANT CHANGE UP (ref 0–8)
ESTIMATED AVERAGE GLUCOSE: 200 MG/DL — HIGH (ref 68–114)
GLUCOSE BLDC GLUCOMTR-MCNC: 235 MG/DL — HIGH (ref 70–99)
GLUCOSE BLDC GLUCOMTR-MCNC: 285 MG/DL — HIGH (ref 70–99)
GLUCOSE BLDC GLUCOMTR-MCNC: 311 MG/DL — HIGH (ref 70–99)
GLUCOSE SERPL-MCNC: 218 MG/DL — HIGH (ref 70–99)
HCT VFR BLD CALC: 41.7 % — SIGNIFICANT CHANGE UP (ref 37–47)
HGB BLD-MCNC: 13.6 G/DL — SIGNIFICANT CHANGE UP (ref 12–16)
IMM GRANULOCYTES NFR BLD AUTO: 0.2 % — SIGNIFICANT CHANGE UP (ref 0.1–0.3)
LYMPHOCYTES # BLD AUTO: 40.1 K/UL — HIGH (ref 1.2–3.4)
LYMPHOCYTES # BLD AUTO: 85.9 % — HIGH (ref 20.5–51.1)
MCHC RBC-ENTMCNC: 26.6 PG — LOW (ref 27–31)
MCHC RBC-ENTMCNC: 32.6 G/DL — SIGNIFICANT CHANGE UP (ref 32–37)
MCV RBC AUTO: 81.4 FL — SIGNIFICANT CHANGE UP (ref 81–99)
MONOCYTES # BLD AUTO: 1.42 K/UL — HIGH (ref 0.1–0.6)
MONOCYTES NFR BLD AUTO: 3 % — SIGNIFICANT CHANGE UP (ref 1.7–9.3)
NEUTROPHILS # BLD AUTO: 4.8 K/UL — SIGNIFICANT CHANGE UP (ref 1.4–6.5)
NEUTROPHILS NFR BLD AUTO: 10.4 % — LOW (ref 42.2–75.2)
NRBC # BLD: 0 /100 WBCS — SIGNIFICANT CHANGE UP (ref 0–0)
PLATELET # BLD AUTO: 353 K/UL — SIGNIFICANT CHANGE UP (ref 130–400)
PMV BLD: 8.6 FL — SIGNIFICANT CHANGE UP (ref 7.4–10.4)
POTASSIUM SERPL-MCNC: 5.3 MMOL/L — HIGH (ref 3.5–5)
POTASSIUM SERPL-SCNC: 5.3 MMOL/L — HIGH (ref 3.5–5)
PROT SERPL-MCNC: 6 G/DL — SIGNIFICANT CHANGE UP (ref 6–8)
RBC # BLD: 5.12 M/UL — SIGNIFICANT CHANGE UP (ref 4.2–5.4)
RBC # FLD: 15.6 % — HIGH (ref 11.5–14.5)
SODIUM SERPL-SCNC: 127 MMOL/L — LOW (ref 135–146)
SODIUM UR-SCNC: 34 MMOL/L — SIGNIFICANT CHANGE UP
WBC # BLD: 46.7 K/UL — CRITICAL HIGH (ref 4.8–10.8)
WBC # FLD AUTO: 46.7 K/UL — CRITICAL HIGH (ref 4.8–10.8)

## 2024-09-04 PROCEDURE — 78452 HT MUSCLE IMAGE SPECT MULT: CPT | Mod: 26

## 2024-09-04 PROCEDURE — 93925 LOWER EXTREMITY STUDY: CPT | Mod: 26

## 2024-09-04 PROCEDURE — 93018 CV STRESS TEST I&R ONLY: CPT

## 2024-09-04 PROCEDURE — 99233 SBSQ HOSP IP/OBS HIGH 50: CPT

## 2024-09-04 PROCEDURE — 93016 CV STRESS TEST SUPVJ ONLY: CPT

## 2024-09-04 PROCEDURE — 93306 TTE W/DOPPLER COMPLETE: CPT | Mod: 26

## 2024-09-04 PROCEDURE — 93010 ELECTROCARDIOGRAM REPORT: CPT | Mod: 59

## 2024-09-04 PROCEDURE — 99223 1ST HOSP IP/OBS HIGH 75: CPT

## 2024-09-04 RX ORDER — POLYETHYLENE GLYCOL 3350 17 G/17G
17 POWDER, FOR SOLUTION ORAL DAILY
Refills: 0 | Status: DISCONTINUED | OUTPATIENT
Start: 2024-09-04 | End: 2024-09-10

## 2024-09-04 RX ORDER — REGADENOSON 0.08 MG/ML
0.4 INJECTION, SOLUTION INTRAVENOUS ONCE
Refills: 0 | Status: DISCONTINUED | OUTPATIENT
Start: 2024-09-04 | End: 2024-09-10

## 2024-09-04 RX ORDER — POLYETHYLENE GLYCOL 3350 17 G/17G
17 POWDER, FOR SOLUTION ORAL ONCE
Refills: 0 | Status: COMPLETED | OUTPATIENT
Start: 2024-09-04 | End: 2024-09-04

## 2024-09-04 RX ORDER — SENNA 187 MG
2 TABLET ORAL AT BEDTIME
Refills: 0 | Status: DISCONTINUED | OUTPATIENT
Start: 2024-09-04 | End: 2024-09-10

## 2024-09-04 RX ADMIN — Medication 40 MILLIGRAM(S): at 05:37

## 2024-09-04 RX ADMIN — POLYETHYLENE GLYCOL 3350 17 GRAM(S): 17 POWDER, FOR SOLUTION ORAL at 17:10

## 2024-09-04 RX ADMIN — INSULIN GLARGINE 10 UNIT(S): 100 INJECTION, SOLUTION SUBCUTANEOUS at 21:52

## 2024-09-04 RX ADMIN — LOSARTAN POTASSIUM 100 MILLIGRAM(S): 50 TABLET ORAL at 05:38

## 2024-09-04 RX ADMIN — Medication 2 TABLET(S): at 21:52

## 2024-09-04 RX ADMIN — Medication 3: at 17:11

## 2024-09-04 RX ADMIN — Medication 2: at 07:59

## 2024-09-04 RX ADMIN — CARVEDILOL 3.12 MILLIGRAM(S): 6.25 TABLET ORAL at 05:38

## 2024-09-04 RX ADMIN — Medication 20 MILLIGRAM(S): at 21:52

## 2024-09-04 RX ADMIN — ENOXAPARIN SODIUM 40 MILLIGRAM(S): 100 INJECTION SUBCUTANEOUS at 18:11

## 2024-09-04 RX ADMIN — CARVEDILOL 3.12 MILLIGRAM(S): 6.25 TABLET ORAL at 18:11

## 2024-09-04 NOTE — CHART NOTE - NSCHARTNOTEFT_GEN_A_CORE
Patient with (+) stress test- LHC/ TAVR as per Dr. Rodriguez.  Cardiology team spoke with patients daughter Amie, explained the results of diagnostic testing.  We offered options for treatment depending on how aggressive Amie and patient want to be.  At this time, Amie would like to speak with the palliative care team to discuss potential options for symptom management, but hasn't decided definitively on proceeding/ or refusing LHC/ TAVR at this time. Patient with (+) stress test/ Moderate to severe aortic valve stenosis.    Recommendation for treatment is LHC/ TAVR as per Dr. Rodriguez.  Cardiology team spoke with patients daughter Amie, explained the results of diagnostic testing.  We offered options for treatment depending on how aggressive Amie and patient want to be.  At this time, Amie would like to speak with the palliative care team to discuss potential options for symptom management, but hasn't decided definitively on proceeding/ or refusing LHC/ TAVR at this time.

## 2024-09-04 NOTE — PROGRESS NOTE ADULT - ASSESSMENT
#suspected acute on chronic diastolic CHF- was only on lasix for one week then stopped, per daughter gets her to drink excessive fluids  - iv lasix 40mg daily  - i<o  -fluid restriction   -echo  < from: TTE Echo Complete w/o Contrast w/ Doppler (09.04.24 @ 07:37) >  Summary:   1. Left ventricular ejection fraction, by visual estimation, is 50 to   55%.   2. Mildly decreased segmental left ventricular systolic function.   3. Mid and apical anterior septum, apical inferior segment, and apex are   abnormal as described above.   4. Mild left ventricular hypertrophy.   5. Normal right ventricular size and function.   6. Mild to moderately enlarged left atrium.   7. Mild mitral annular calcification.   8. Mild to moderate mitral valve regurgitation.   9. Moderate tricuspid regurgitation.  10. Mild aortic regurgitation.  11. Moderate to severe aortic valve stenosis.  12. Mild pulmonic valve regurgitation.  13. There is mild aortic root calcification.    < end of copied text >    -cardio eval appreciated - LEXISCAN today - follow up results     #troponinemia - likely type II demand ischemia - + delta in trops but then flat - cont tele monitoring , follow up with cardio    #acute hypoxic resp failure - improved from Er - off NIV - now stable on NC - cont to wean and ambulate  #DMII - insulin basal bolus  CAPILLARY BLOOD GLUCOSE      POCT Blood Glucose.: 235 mg/dL (04 Sep 2024 07:40)  POCT Blood Glucose.: 265 mg/dL (03 Sep 2024 20:59)  POCT Blood Glucose.: 262 mg/dL (03 Sep 2024 16:21)      #hyponatremia - fluid resitrication , IV lasix  - cont to monitor bmp- improving - avoid rapid over correction  120-125-127  #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

## 2024-09-05 LAB
GLUCOSE BLDC GLUCOMTR-MCNC: 162 MG/DL — HIGH (ref 70–99)
GLUCOSE BLDC GLUCOMTR-MCNC: 205 MG/DL — HIGH (ref 70–99)
GLUCOSE BLDC GLUCOMTR-MCNC: 390 MG/DL — HIGH (ref 70–99)
MANUAL DIF COMMENT BLD-IMP: SIGNIFICANT CHANGE UP
OSMOLALITY SERPL: 290 MOS/KG — SIGNIFICANT CHANGE UP (ref 280–301)
OSMOLALITY UR: 583 MOS/KG — SIGNIFICANT CHANGE UP (ref 50–1400)

## 2024-09-05 PROCEDURE — 99233 SBSQ HOSP IP/OBS HIGH 50: CPT

## 2024-09-05 RX ORDER — ASPIRIN 81 MG
81 TABLET, DELAYED RELEASE (ENTERIC COATED) ORAL DAILY
Refills: 0 | Status: DISCONTINUED | OUTPATIENT
Start: 2024-09-05 | End: 2024-09-10

## 2024-09-05 RX ADMIN — Medication 2 TABLET(S): at 21:28

## 2024-09-05 RX ADMIN — Medication 5: at 13:12

## 2024-09-05 RX ADMIN — POLYETHYLENE GLYCOL 3350 17 GRAM(S): 17 POWDER, FOR SOLUTION ORAL at 13:13

## 2024-09-05 RX ADMIN — CARVEDILOL 3.12 MILLIGRAM(S): 6.25 TABLET ORAL at 17:25

## 2024-09-05 RX ADMIN — Medication 3 MILLIGRAM(S): at 21:29

## 2024-09-05 RX ADMIN — Medication 81 MILLIGRAM(S): at 17:27

## 2024-09-05 RX ADMIN — Medication 40 MILLIGRAM(S): at 06:39

## 2024-09-05 RX ADMIN — Medication 20 MILLIGRAM(S): at 21:28

## 2024-09-05 RX ADMIN — CARVEDILOL 3.12 MILLIGRAM(S): 6.25 TABLET ORAL at 06:39

## 2024-09-05 RX ADMIN — LOSARTAN POTASSIUM 100 MILLIGRAM(S): 50 TABLET ORAL at 06:39

## 2024-09-05 RX ADMIN — ENOXAPARIN SODIUM 40 MILLIGRAM(S): 100 INJECTION SUBCUTANEOUS at 17:27

## 2024-09-05 RX ADMIN — Medication 2: at 17:24

## 2024-09-05 RX ADMIN — INSULIN GLARGINE 10 UNIT(S): 100 INJECTION, SOLUTION SUBCUTANEOUS at 21:28

## 2024-09-05 NOTE — PHYSICAL THERAPY INITIAL EVALUATION ADULT - ADDITIONAL COMMENTS
per pt she lives Daughter in PH W/ 4 Steps to enter W/ LT HR and 1 FOS ot Bedroom and Shower W/ RT HR , as per pt she was independent W/ bed mobility , transfer and ambulation W/ Rollator ,  Need  Assistance for ADLS Which her daughter provides her , need Assistance for Stair Nego  Which Daughter gives her as well.

## 2024-09-05 NOTE — PHYSICAL THERAPY INITIAL EVALUATION ADULT - GENERAL OBSERVATIONS, REHAB EVAL
13:10 - 13:40 Chart reviewed. Order received.  Patient is ok to be  seen for PT,confirmed with RN. pt encountered   Sitting at  Bedside chair  denies pain, and agrees to participate in session, +  Heplock , + O2 2 LPM via NC  , Disconnected as per RN Madeleine,JYOTI.

## 2024-09-05 NOTE — DIETITIAN INITIAL EVALUATION ADULT - NS FNS DIET ORDER
Diet, Consistent Carbohydrate/No Snacks:   DASH/TLC {Sodium & Cholesterol Restricted} (DASH)  1500mL Fluid Restriction ([id number]) (09-04-24 @ 16:02) [Active]

## 2024-09-05 NOTE — DIETITIAN INITIAL EVALUATION ADULT - PERTINENT MEDS FT
MEDICATIONS  (STANDING):  aspirin enteric coated 81 milliGRAM(s) Oral daily  atorvastatin 20 milliGRAM(s) Oral at bedtime  carvedilol 3.125 milliGRAM(s) Oral every 12 hours  furosemide   Injectable 40 milliGRAM(s) IV Push daily  insulin glargine Injectable (LANTUS) 10 Unit(s) SubCutaneous at bedtime  insulin lispro (ADMELOG) corrective regimen sliding scale   SubCutaneous three times a day before meals  losartan 100 milliGRAM(s) Oral daily  polyethylene glycol 3350 17 Gram(s) Oral daily  regadenoson Injectable 0.4 milliGRAM(s) IV Push once  senna 2 Tablet(s) Oral at bedtime    MEDICATIONS  (PRN):  acetaminophen     Tablet .. 650 milliGRAM(s) Oral every 6 hours PRN Temp greater or equal to 38C (100.4F), Mild Pain (1 - 3)  aluminum hydroxide/magnesium hydroxide/simethicone Suspension 30 milliLiter(s) Oral every 4 hours PRN Dyspepsia  dextrose Oral Gel 15 Gram(s) Oral once PRN Blood Glucose LESS THAN 70 milliGRAM(s)/deciliter  melatonin 3 milliGRAM(s) Oral at bedtime PRN Insomnia  ondansetron Injectable 4 milliGRAM(s) IV Push every 8 hours PRN Nausea and/or Vomiting

## 2024-09-05 NOTE — DIETITIAN INITIAL EVALUATION ADULT - ORAL INTAKE PTA/DIET HISTORY
as per daughter at bedside pt consumes mostly fruits and vegetables, no processed or packaged foods. weight has been stable. Daughter states a previous MD instructed pt to consumed 3 L of water daily. NKFA or intolerances    presently on a DASH/TLC CHO Consistent diet with 1500 ml fluid restriction 2/2 hyponatremia tolerating well consumes >75% of meals. Diet reviewed with pt and daughter.

## 2024-09-05 NOTE — DIETITIAN INITIAL EVALUATION ADULT - PERTINENT LABORATORY DATA
09-04    127<L>  |  93<L>  |  28<H>  ----------------------------<  218<H>  5.3<H>   |  26  |  0.8    Ca    8.6      04 Sep 2024 06:46    TPro  6.0  /  Alb  3.1<L>  /  TBili  0.2  /  DBili  x   /  AST  55<H>  /  ALT  56<H>  /  AlkPhos  125<H>  09-04  POCT Blood Glucose.: 162 mg/dL (09-05-24 @ 20:53)  A1C with Estimated Average Glucose Result: 8.6 % (09-04-24 @ 06:46)  A1C with Estimated Average Glucose Result: 8.6 % (06-05-24 @ 08:17)

## 2024-09-05 NOTE — PHYSICAL THERAPY INITIAL EVALUATION ADULT - BALANCE DISTURBANCE, SYSTEM IMPAIRMENT CONTRIBUTE, REHAB EVAL
Take Tylenol or ibuprofen as needed for pain  Oral hydration is important-keep hydrated.  Hand-foot-and-mouth disease (HFM) incubation period (about 3-7days), and that HFMD is contagious.  HFM lesions can take up 7-10 days to heal.   musculoskeletal

## 2024-09-05 NOTE — DIETITIAN INITIAL EVALUATION ADULT - OTHER INFO
pt is 88 year old female with hx of HTN, DM p/w SOB and anxiety for 1-2 days and worsening edema. pt admitted with AHRF 2/2v volume overload 2/2 presumed CHF, elevated trops noted r/o ACS and hyponatremia

## 2024-09-05 NOTE — PROGRESS NOTE ADULT - ASSESSMENT
#suspected acute on chronic diastolic CHF- was only on lasix for one week then stopped, per daughter gets her to drink excessive fluids  - iv lasix 40mg daily  - i<o  -fluid restriction   -echo  < from: TTE Echo Complete w/o Contrast w/ Doppler (09.04.24 @ 07:37) >  Summary:   1. Left ventricular ejection fraction, by visual estimation, is 50 to   55%.   2. Mildly decreased segmental left ventricular systolic function.   3. Mid and apical anterior septum, apical inferior segment, and apex are   abnormal as described above.   4. Mild left ventricular hypertrophy.   5. Normal right ventricular size and function.   6. Mild to moderately enlarged left atrium.   7. Mild mitral annular calcification.   8. Mild to moderate mitral valve regurgitation.   9. Moderate tricuspid regurgitation.  10. Mild aortic regurgitation.  11. Moderate to severe aortic valve stenosis.  12. Mild pulmonic valve regurgitation.  13. There is mild aortic root calcification.    < end of copied text >    -cardio eval appreciated - LEXISCAN :  Impression:  1.   Small to moderate size severe reversible defect in the inferolateral   wall and small reversible defect in the anteroseptal wall of the left   ventricle consistent with ischemia and suggesting ischemia in multiple   coronary territories.  2.  Dilated left ventricle with mild global hypokinesis. All walls of the   left ventricle thicken.  3.  Left ventricular ejection fraction calculated as 46% which is low.   Wall motion analysis suggests ejection fraction of 40-45%.   Echocardiographic ejection fraction by visual estimation 50-55% on   09/04/2024          #troponinemia - NSTEMI II - likely type II demand ischemia - + delta in trops but then flat - cont tele monitoring ,     discussed with cardio dr ritter as well as daughter over phone today 9/6  dicsussed that needs eval for CATH/TAVR  patient states she doesnt want "surgery" - but may be open to procedure such as cath - pt/family to make decision soon   follow up with pt/family and then will coordinate with cardio    #acute hypoxic resp failure - improved from Er - off NIV - now off o2 on RA - cont to ambulate   #DMII - insulin basal bolus - adjust   CAPILLARY BLOOD GLUCOSE      POCT Blood Glucose.: 390 mg/dL (05 Sep 2024 12:39)  POCT Blood Glucose.: 311 mg/dL (04 Sep 2024 21:03)  POCT Blood Glucose.: 285 mg/dL (04 Sep 2024 17:01)          #hyponatremia - fluid resitrication , IV lasix  - cont to monitor bmp- improving - avoid rapid over correction  [phone]- check bmp in am   #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

## 2024-09-06 LAB
ANION GAP SERPL CALC-SCNC: 9 MMOL/L — SIGNIFICANT CHANGE UP (ref 7–14)
BUN SERPL-MCNC: 25 MG/DL — HIGH (ref 10–20)
CALCIUM SERPL-MCNC: 8.4 MG/DL — SIGNIFICANT CHANGE UP (ref 8.4–10.5)
CHLORIDE SERPL-SCNC: 93 MMOL/L — LOW (ref 98–110)
CO2 SERPL-SCNC: 27 MMOL/L — SIGNIFICANT CHANGE UP (ref 17–32)
CREAT SERPL-MCNC: 0.5 MG/DL — LOW (ref 0.7–1.5)
EGFR: 91 ML/MIN/1.73M2 — SIGNIFICANT CHANGE UP
GLUCOSE BLDC GLUCOMTR-MCNC: 172 MG/DL — HIGH (ref 70–99)
GLUCOSE BLDC GLUCOMTR-MCNC: 224 MG/DL — HIGH (ref 70–99)
GLUCOSE BLDC GLUCOMTR-MCNC: 229 MG/DL — HIGH (ref 70–99)
GLUCOSE BLDC GLUCOMTR-MCNC: 290 MG/DL — HIGH (ref 70–99)
GLUCOSE SERPL-MCNC: 160 MG/DL — HIGH (ref 70–99)
HCT VFR BLD CALC: 39.5 % — SIGNIFICANT CHANGE UP (ref 37–47)
HGB BLD-MCNC: 12.9 G/DL — SIGNIFICANT CHANGE UP (ref 12–16)
MCHC RBC-ENTMCNC: 26.8 PG — LOW (ref 27–31)
MCHC RBC-ENTMCNC: 32.7 G/DL — SIGNIFICANT CHANGE UP (ref 32–37)
MCV RBC AUTO: 82 FL — SIGNIFICANT CHANGE UP (ref 81–99)
NRBC # BLD: 0 /100 WBCS — SIGNIFICANT CHANGE UP (ref 0–0)
PLATELET # BLD AUTO: 259 K/UL — SIGNIFICANT CHANGE UP (ref 130–400)
PMV BLD: 8.2 FL — SIGNIFICANT CHANGE UP (ref 7.4–10.4)
POTASSIUM SERPL-MCNC: 4.7 MMOL/L — SIGNIFICANT CHANGE UP (ref 3.5–5)
POTASSIUM SERPL-SCNC: 4.7 MMOL/L — SIGNIFICANT CHANGE UP (ref 3.5–5)
RBC # BLD: 4.82 M/UL — SIGNIFICANT CHANGE UP (ref 4.2–5.4)
RBC # FLD: 16 % — HIGH (ref 11.5–14.5)
SODIUM SERPL-SCNC: 129 MMOL/L — LOW (ref 135–146)
WBC # BLD: 41.92 K/UL — CRITICAL HIGH (ref 4.8–10.8)
WBC # FLD AUTO: 41.92 K/UL — CRITICAL HIGH (ref 4.8–10.8)

## 2024-09-06 PROCEDURE — 99233 SBSQ HOSP IP/OBS HIGH 50: CPT

## 2024-09-06 PROCEDURE — 99232 SBSQ HOSP IP/OBS MODERATE 35: CPT

## 2024-09-06 RX ORDER — FUROSEMIDE 40 MG
40 TABLET ORAL DAILY
Refills: 0 | Status: DISCONTINUED | OUTPATIENT
Start: 2024-09-06 | End: 2024-09-10

## 2024-09-06 RX ADMIN — LOSARTAN POTASSIUM 100 MILLIGRAM(S): 50 TABLET ORAL at 05:34

## 2024-09-06 RX ADMIN — Medication 2: at 18:20

## 2024-09-06 RX ADMIN — ACETAMINOPHEN 650 MILLIGRAM(S): 325 TABLET ORAL at 11:35

## 2024-09-06 RX ADMIN — ENOXAPARIN SODIUM 40 MILLIGRAM(S): 100 INJECTION SUBCUTANEOUS at 18:19

## 2024-09-06 RX ADMIN — Medication 1: at 12:45

## 2024-09-06 RX ADMIN — INSULIN GLARGINE 10 UNIT(S): 100 INJECTION, SOLUTION SUBCUTANEOUS at 21:29

## 2024-09-06 RX ADMIN — CHLORHEXIDINE GLUCONATE 1 APPLICATION(S): 40 SOLUTION TOPICAL at 05:34

## 2024-09-06 RX ADMIN — ACETAMINOPHEN 650 MILLIGRAM(S): 325 TABLET ORAL at 10:41

## 2024-09-06 RX ADMIN — CARVEDILOL 3.12 MILLIGRAM(S): 6.25 TABLET ORAL at 05:33

## 2024-09-06 RX ADMIN — POLYETHYLENE GLYCOL 3350 17 GRAM(S): 17 POWDER, FOR SOLUTION ORAL at 12:45

## 2024-09-06 RX ADMIN — CARVEDILOL 3.12 MILLIGRAM(S): 6.25 TABLET ORAL at 18:19

## 2024-09-06 RX ADMIN — Medication 81 MILLIGRAM(S): at 12:49

## 2024-09-06 RX ADMIN — Medication 20 MILLIGRAM(S): at 21:28

## 2024-09-06 RX ADMIN — Medication 40 MILLIGRAM(S): at 05:33

## 2024-09-06 RX ADMIN — Medication 3: at 09:29

## 2024-09-06 NOTE — PROGRESS NOTE ADULT - ASSESSMENT
#suspected acute on chronic diastolic CHF- was only on lasix for one week then stopped, per daughter gets her to drink excessive fluids  - iv lasix 40mg daily - change to 40mg po on 9/7  - i<o  -fluid restriction   -echo  < from: TTE Echo Complete w/o Contrast w/ Doppler (09.04.24 @ 07:37) >  Summary:   1. Left ventricular ejection fraction, by visual estimation, is 50 to   55%.   2. Mildly decreased segmental left ventricular systolic function.   3. Mid and apical anterior septum, apical inferior segment, and apex are   abnormal as described above.   4. Mild left ventricular hypertrophy.   5. Normal right ventricular size and function.   6. Mild to moderately enlarged left atrium.   7. Mild mitral annular calcification.   8. Mild to moderate mitral valve regurgitation.   9. Moderate tricuspid regurgitation.  10. Mild aortic regurgitation.  11. Moderate to severe aortic valve stenosis.  12. Mild pulmonic valve regurgitation.  13. There is mild aortic root calcification.    < end of copied text >    -cardio eval appreciated - LEXISCAN :  Impression:  1.   Small to moderate size severe reversible defect in the inferolateral   wall and small reversible defect in the anteroseptal wall of the left   ventricle consistent with ischemia and suggesting ischemia in multiple   coronary territories.  2.  Dilated left ventricle with mild global hypokinesis. All walls of the   left ventricle thicken.  3.  Left ventricular ejection fraction calculated as 46% which is low.   Wall motion analysis suggests ejection fraction of 40-45%.   Echocardiographic ejection fraction by visual estimation 50-55% on   09/04/2024          #troponinemia - NSTEMI II - likely type II demand ischemia - + delta in trops but then flat - cont tele monitoring ,   on asa/statin/beta    discussed with cardio dr ritter as well as daughter over phone today 9/6  dicsussed that needs eval for CATH/TAVR  patient states she doesnt want "surgery" - but now is agreeable for cath - informed cardio - follow up with cardio for schedule   #acute hypoxic resp failure -resolved - off NIV - now off o2 on RA - cont to ambulate   #DMII - insulin basal bolus - adjust   CAPILLARY BLOOD GLUCOSE      POCT Blood Glucose.: 290 mg/dL (06 Sep 2024 08:51)  POCT Blood Glucose.: 162 mg/dL (05 Sep 2024 20:53)  POCT Blood Glucose.: 205 mg/dL (05 Sep 2024 16:47)  POCT Blood Glucose.: 390 mg/dL (05 Sep 2024 12:39)        #hyponatremia - fluid resitrication , IV lasix  - cont to monitor bmp- improving - avoid rapid over correction  [phone]   #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

## 2024-09-06 NOTE — PROGRESS NOTE ADULT - ASSESSMENT
88F w/PMHx of HTN, DM2, HLD presents to ED with SOB and increased leg swelling     Plan  # Acute on chronic CHF  # Volume Overload  # Leukocytosis /  ? CLL /  # Hyponatremia  - Pt presented with SOB and B/L LE worsening over the last few months  - mild troponin elevation likely demand ischemia  - NM stress 09/04 +  reversible defect consistent with ischemia  - ECHO with LVEF 50-55%, +RWMA,, mod to sev AS  - discussed possibility of LHC/TAVR with patient's DTR Amie. They are agreeable to LHC but do not want TAVR at this time  - will f/u with Interventional cardiology re timing of cath  - c/w Asa / Atorvastatin / Coreg /  Losartan / Lasix  - Fluid restriction  - Monitor Lytes           88F w/PMHx of HTN, DM2, HLD presents to ED with SOB and increased leg swelling     Plan  # Acute on chronic CHF  # Aortic Stenosis  # Leukocytosis /  ? CLL /  # Hyponatremia  - Pt presented with SOB and B/L LE worsening over the last few months  - mild troponin elevation likely demand ischemia  - NM stress 09/04 +  reversible defect consistent with ischemia  - ECHO with LVEF 50-55%, +RWMA,, mod to sev AS  - discussed possibility of LHC/TAVR with patient's DTR Amie. They are agreeable to LHC but do not want TAVR at this time  - will f/u with Interventional cardiology re timing of cath  - c/w Asa / Atorvastatin / Coreg /  Losartan / Lasix  - Fluid restriction  - Monitor Lytes

## 2024-09-06 NOTE — CHART NOTE - NSCHARTNOTEFT_GEN_A_CORE
pt added on for LHC on Mon 9/9 at [facility]  keep npo from [place] on Sunday except meds  hold ppx Lovenox on Mon

## 2024-09-07 PROCEDURE — 99232 SBSQ HOSP IP/OBS MODERATE 35: CPT

## 2024-09-07 RX ORDER — INSULIN GLARGINE 100 [IU]/ML
15 INJECTION, SOLUTION SUBCUTANEOUS AT BEDTIME
Refills: 0 | Status: DISCONTINUED | OUTPATIENT
Start: 2024-09-07 | End: 2024-09-10

## 2024-09-07 RX ADMIN — Medication 6: at 16:29

## 2024-09-07 RX ADMIN — LOSARTAN POTASSIUM 100 MILLIGRAM(S): 50 TABLET ORAL at 05:57

## 2024-09-07 RX ADMIN — INSULIN GLARGINE 15 UNIT(S): 100 INJECTION, SOLUTION SUBCUTANEOUS at 21:51

## 2024-09-07 RX ADMIN — Medication 81 MILLIGRAM(S): at 11:34

## 2024-09-07 RX ADMIN — ENOXAPARIN SODIUM 40 MILLIGRAM(S): 100 INJECTION SUBCUTANEOUS at 17:14

## 2024-09-07 RX ADMIN — Medication 4: at 12:10

## 2024-09-07 RX ADMIN — CARVEDILOL 3.12 MILLIGRAM(S): 6.25 TABLET ORAL at 17:14

## 2024-09-07 RX ADMIN — Medication 5 UNIT(S): at 17:01

## 2024-09-07 RX ADMIN — ACETAMINOPHEN 650 MILLIGRAM(S): 325 TABLET ORAL at 09:54

## 2024-09-07 RX ADMIN — Medication 40 MILLIGRAM(S): at 05:53

## 2024-09-07 RX ADMIN — Medication 20 MILLIGRAM(S): at 21:37

## 2024-09-07 RX ADMIN — ACETAMINOPHEN 650 MILLIGRAM(S): 325 TABLET ORAL at 11:04

## 2024-09-07 RX ADMIN — CARVEDILOL 3.12 MILLIGRAM(S): 6.25 TABLET ORAL at 05:52

## 2024-09-07 RX ADMIN — Medication 4 UNIT(S): at 21:53

## 2024-09-07 NOTE — PROGRESS NOTE ADULT - ASSESSMENT
#suspected acute on chronic diastolic CHF- was only on lasix for one week then stopped, per daughter gets her to drink excessive fluids  - po lasix 40mg po   - i<o  -fluid restriction   -echo  < from: TTE Echo Complete w/o Contrast w/ Doppler (09.04.24 @ 07:37) >  Summary:   1. Left ventricular ejection fraction, by visual estimation, is 50 to   55%.   2. Mildly decreased segmental left ventricular systolic function.   3. Mid and apical anterior septum, apical inferior segment, and apex are   abnormal as described above.   4. Mild left ventricular hypertrophy.   5. Normal right ventricular size and function.   6. Mild to moderately enlarged left atrium.   7. Mild mitral annular calcification.   8. Mild to moderate mitral valve regurgitation.   9. Moderate tricuspid regurgitation.  10. Mild aortic regurgitation.  11. Moderate to severe aortic valve stenosis.  12. Mild pulmonic valve regurgitation.  13. There is mild aortic root calcification.    < end of copied text >    -cardio eval appreciated - LEXISCAN :  Impression:  1.   Small to moderate size severe reversible defect in the inferolateral   wall and small reversible defect in the anteroseptal wall of the left   ventricle consistent with ischemia and suggesting ischemia in multiple   coronary territories.  2.  Dilated left ventricle with mild global hypokinesis. All walls of the   left ventricle thicken.  3.  Left ventricular ejection fraction calculated as 46% which is low.   Wall motion analysis suggests ejection fraction of 40-45%.   Echocardiographic ejection fraction by visual estimation 50-55% on   09/04/2024          #troponinemia - NSTEMI II - likely type II demand ischemia - + delta in trops but then flat - cont tele monitoring ,   on asa/statin/beta    discussed with cardio dr ritter as well as daughter over phone today 9/6  dicsussed that needs eval for CATH/TAVR  patient states she doesnt want "surgery" - but now is agreeable for cath -plan for cardiac cath 9/9 at [facility]  #acute hypoxic resp failure -resolved - off NIV - now off o2 on RA - cont to ambulate   #DMII - insulin basal bolus - adjust   CAPILLARY BLOOD GLUCOSE      POCT Blood Glucose.: 290 mg/dL (06 Sep 2024 08:51)  POCT Blood Glucose.: 162 mg/dL (05 Sep 2024 20:53)  POCT Blood Glucose.: 205 mg/dL (05 Sep 2024 16:47)  POCT Blood Glucose.: 390 mg/dL (05 Sep 2024 12:39)        #hyponatremia - fluid resitrication , IV lasix  - cont to monitor bmp- improving - avoid rapid over correction  [phone]   #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

## 2024-09-08 LAB
ALBUMIN SERPL ELPH-MCNC: 3.2 G/DL — LOW (ref 3.5–5.2)
ALP SERPL-CCNC: 112 U/L — SIGNIFICANT CHANGE UP (ref 30–115)
ALT FLD-CCNC: 41 U/L — SIGNIFICANT CHANGE UP (ref 0–41)
ANION GAP SERPL CALC-SCNC: 6 MMOL/L — LOW (ref 7–14)
AST SERPL-CCNC: 25 U/L — SIGNIFICANT CHANGE UP (ref 0–41)
BASOPHILS # BLD AUTO: 0.12 K/UL — SIGNIFICANT CHANGE UP (ref 0–0.2)
BASOPHILS NFR BLD AUTO: 0.3 % — SIGNIFICANT CHANGE UP (ref 0–1)
BILIRUB SERPL-MCNC: 0.2 MG/DL — SIGNIFICANT CHANGE UP (ref 0.2–1.2)
BUN SERPL-MCNC: 24 MG/DL — HIGH (ref 10–20)
CALCIUM SERPL-MCNC: 8.9 MG/DL — SIGNIFICANT CHANGE UP (ref 8.4–10.5)
CHLORIDE SERPL-SCNC: 97 MMOL/L — LOW (ref 98–110)
CO2 SERPL-SCNC: 30 MMOL/L — SIGNIFICANT CHANGE UP (ref 17–32)
CREAT SERPL-MCNC: 0.6 MG/DL — LOW (ref 0.7–1.5)
EGFR: 87 ML/MIN/1.73M2 — SIGNIFICANT CHANGE UP
EOSINOPHIL # BLD AUTO: 0.16 K/UL — SIGNIFICANT CHANGE UP (ref 0–0.7)
EOSINOPHIL NFR BLD AUTO: 0.5 % — SIGNIFICANT CHANGE UP (ref 0–8)
GLUCOSE SERPL-MCNC: 150 MG/DL — HIGH (ref 70–99)
HCT VFR BLD CALC: 37.1 % — SIGNIFICANT CHANGE UP (ref 37–47)
HGB BLD-MCNC: 12.2 G/DL — SIGNIFICANT CHANGE UP (ref 12–16)
IMM GRANULOCYTES NFR BLD AUTO: 0.2 % — SIGNIFICANT CHANGE UP (ref 0.1–0.3)
LYMPHOCYTES # BLD AUTO: 30.58 K/UL — HIGH (ref 1.2–3.4)
LYMPHOCYTES # BLD AUTO: 86.2 % — HIGH (ref 20.5–51.1)
MAGNESIUM SERPL-MCNC: 1.9 MG/DL — SIGNIFICANT CHANGE UP (ref 1.8–2.4)
MCHC RBC-ENTMCNC: 27.4 PG — SIGNIFICANT CHANGE UP (ref 27–31)
MCHC RBC-ENTMCNC: 32.9 G/DL — SIGNIFICANT CHANGE UP (ref 32–37)
MCV RBC AUTO: 83.4 FL — SIGNIFICANT CHANGE UP (ref 81–99)
MONOCYTES # BLD AUTO: 0.87 K/UL — HIGH (ref 0.1–0.6)
MONOCYTES NFR BLD AUTO: 2.5 % — SIGNIFICANT CHANGE UP (ref 1.7–9.3)
NEUTROPHILS # BLD AUTO: 3.68 K/UL — SIGNIFICANT CHANGE UP (ref 1.4–6.5)
NEUTROPHILS NFR BLD AUTO: 10.3 % — LOW (ref 42.2–75.2)
NRBC # BLD: 0 /100 WBCS — SIGNIFICANT CHANGE UP (ref 0–0)
PLATELET # BLD AUTO: 261 K/UL — SIGNIFICANT CHANGE UP (ref 130–400)
PMV BLD: 8.7 FL — SIGNIFICANT CHANGE UP (ref 7.4–10.4)
POTASSIUM SERPL-MCNC: 4.8 MMOL/L — SIGNIFICANT CHANGE UP (ref 3.5–5)
POTASSIUM SERPL-SCNC: 4.8 MMOL/L — SIGNIFICANT CHANGE UP (ref 3.5–5)
PROT SERPL-MCNC: 5.9 G/DL — LOW (ref 6–8)
RBC # BLD: 4.45 M/UL — SIGNIFICANT CHANGE UP (ref 4.2–5.4)
RBC # FLD: 16.2 % — HIGH (ref 11.5–14.5)
SODIUM SERPL-SCNC: 133 MMOL/L — LOW (ref 135–146)
WBC # BLD: 35.47 K/UL — HIGH (ref 4.8–10.8)
WBC # FLD AUTO: 35.47 K/UL — HIGH (ref 4.8–10.8)

## 2024-09-08 PROCEDURE — 99232 SBSQ HOSP IP/OBS MODERATE 35: CPT

## 2024-09-08 RX ADMIN — LOSARTAN POTASSIUM 100 MILLIGRAM(S): 50 TABLET ORAL at 05:54

## 2024-09-08 RX ADMIN — CARVEDILOL 3.12 MILLIGRAM(S): 6.25 TABLET ORAL at 17:29

## 2024-09-08 RX ADMIN — Medication 40 MILLIGRAM(S): at 05:55

## 2024-09-08 RX ADMIN — Medication 2: at 17:29

## 2024-09-08 RX ADMIN — Medication 5 UNIT(S): at 11:58

## 2024-09-08 RX ADMIN — Medication 20 MILLIGRAM(S): at 21:03

## 2024-09-08 RX ADMIN — Medication 3 MILLIGRAM(S): at 21:03

## 2024-09-08 RX ADMIN — Medication 5 UNIT(S): at 08:20

## 2024-09-08 RX ADMIN — Medication 2: at 11:58

## 2024-09-08 RX ADMIN — INSULIN GLARGINE 15 UNIT(S): 100 INJECTION, SOLUTION SUBCUTANEOUS at 21:02

## 2024-09-08 RX ADMIN — Medication 5 UNIT(S): at 17:30

## 2024-09-08 RX ADMIN — Medication 2: at 08:19

## 2024-09-08 RX ADMIN — CARVEDILOL 3.12 MILLIGRAM(S): 6.25 TABLET ORAL at 05:55

## 2024-09-08 RX ADMIN — Medication 81 MILLIGRAM(S): at 11:58

## 2024-09-08 NOTE — PROGRESS NOTE ADULT - ASSESSMENT
#suspected acute on chronic diastolic CHF- was only on lasix for one week then stopped, per daughter gets her to drink excessive fluids  - po lasix 40mg po   - i<o  -fluid restriction   < from: TTE Echo Complete w/o Contrast w/ Doppler (09.04.24 @ 07:37) >  Summary:   1. Left ventricular ejection fraction, by visual estimation, is 50 to   55%.   2. Mildly decreased segmental left ventricular systolic function.   3. Mid and apical anterior septum, apical inferior segment, and apex are   abnormal as described above.   4. Mild left ventricular hypertrophy.   5. Normal right ventricular size and function.   6. Mild to moderately enlarged left atrium.   7. Mild mitral annular calcification.   8. Mild to moderate mitral valve regurgitation.   9. Moderate tricuspid regurgitation.  10. Mild aortic regurgitation.  11. Moderate to severe aortic valve stenosis.  12. Mild pulmonic valve regurgitation.  13. There is mild aortic root calcification.    < end of copied text >    -cardio eval appreciated - LEXISCAN :  Impression:  1.   Small to moderate size severe reversible defect in the inferolateral   wall and small reversible defect in the anteroseptal wall of the left   ventricle consistent with ischemia and suggesting ischemia in multiple   coronary territories.  2.  Dilated left ventricle with mild global hypokinesis. All walls of the   left ventricle thicken.  3.  Left ventricular ejection fraction calculated as 46% which is low.   Wall motion analysis suggests ejection fraction of 40-45%.   Echocardiographic ejection fraction by visual estimation 50-55% on   09/04/2024          #troponinemia - NSTEMI II - likely type II demand ischemia - + delta in trops but then flat - cont tele monitoring ,   on asa/statin/beta    discussed with cardio dr ritter as well as daughter over phone today 9/6  dicsussed that needs eval for CATH/TAVR  patient states she doesnt want "surgery" - but now is agreeable for cath -plan for cardiac cath 9/9 at [facility]  #acute hypoxic resp failure -resolved - off NIV - now off o2 on RA - cont to ambulate   #DMII - insulin basal bolus - adjust     #hyponatremia - fluid resitrication - cont to monitor bmp- improving - avoid rapid over correction  [phone]   #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

## 2024-09-09 LAB
ALBUMIN SERPL ELPH-MCNC: 3.1 G/DL — LOW (ref 3.5–5.2)
ALP SERPL-CCNC: 115 U/L — SIGNIFICANT CHANGE UP (ref 30–115)
ALT FLD-CCNC: 44 U/L — HIGH (ref 0–41)
ANION GAP SERPL CALC-SCNC: 7 MMOL/L — SIGNIFICANT CHANGE UP (ref 7–14)
APTT BLD: 30.6 SEC — SIGNIFICANT CHANGE UP (ref 27–39.2)
AST SERPL-CCNC: 35 U/L — SIGNIFICANT CHANGE UP (ref 0–41)
BASOPHILS # BLD AUTO: 0.14 K/UL — SIGNIFICANT CHANGE UP (ref 0–0.2)
BASOPHILS NFR BLD AUTO: 0.4 % — SIGNIFICANT CHANGE UP (ref 0–1)
BILIRUB SERPL-MCNC: 0.2 MG/DL — SIGNIFICANT CHANGE UP (ref 0.2–1.2)
BUN SERPL-MCNC: 27 MG/DL — HIGH (ref 10–20)
CALCIUM SERPL-MCNC: 8.8 MG/DL — SIGNIFICANT CHANGE UP (ref 8.4–10.5)
CHLORIDE SERPL-SCNC: 96 MMOL/L — LOW (ref 98–110)
CO2 SERPL-SCNC: 29 MMOL/L — SIGNIFICANT CHANGE UP (ref 17–32)
CREAT SERPL-MCNC: <0.5 MG/DL — LOW (ref 0.7–1.5)
EGFR: 96 ML/MIN/1.73M2 — SIGNIFICANT CHANGE UP
EOSINOPHIL # BLD AUTO: 0.13 K/UL — SIGNIFICANT CHANGE UP (ref 0–0.7)
EOSINOPHIL NFR BLD AUTO: 0.4 % — SIGNIFICANT CHANGE UP (ref 0–8)
GLUCOSE BLDC GLUCOMTR-MCNC: 102 MG/DL — HIGH (ref 70–99)
GLUCOSE BLDC GLUCOMTR-MCNC: 273 MG/DL — HIGH (ref 70–99)
GLUCOSE SERPL-MCNC: 62 MG/DL — LOW (ref 70–99)
HCT VFR BLD CALC: 38.1 % — SIGNIFICANT CHANGE UP (ref 37–47)
HGB BLD-MCNC: 12.3 G/DL — SIGNIFICANT CHANGE UP (ref 12–16)
IMM GRANULOCYTES NFR BLD AUTO: 0.2 % — SIGNIFICANT CHANGE UP (ref 0.1–0.3)
INR BLD: 0.85 RATIO — SIGNIFICANT CHANGE UP (ref 0.65–1.3)
LYMPHOCYTES # BLD AUTO: 30.51 K/UL — HIGH (ref 1.2–3.4)
LYMPHOCYTES # BLD AUTO: 84.5 % — HIGH (ref 20.5–51.1)
MAGNESIUM SERPL-MCNC: 1.9 MG/DL — SIGNIFICANT CHANGE UP (ref 1.8–2.4)
MCHC RBC-ENTMCNC: 27.5 PG — SIGNIFICANT CHANGE UP (ref 27–31)
MCHC RBC-ENTMCNC: 32.3 G/DL — SIGNIFICANT CHANGE UP (ref 32–37)
MCV RBC AUTO: 85.2 FL — SIGNIFICANT CHANGE UP (ref 81–99)
MONOCYTES # BLD AUTO: 0.92 K/UL — HIGH (ref 0.1–0.6)
MONOCYTES NFR BLD AUTO: 2.5 % — SIGNIFICANT CHANGE UP (ref 1.7–9.3)
NEUTROPHILS # BLD AUTO: 4.33 K/UL — SIGNIFICANT CHANGE UP (ref 1.4–6.5)
NEUTROPHILS NFR BLD AUTO: 12 % — LOW (ref 42.2–75.2)
NRBC # BLD: 0 /100 WBCS — SIGNIFICANT CHANGE UP (ref 0–0)
PLATELET # BLD AUTO: 261 K/UL — SIGNIFICANT CHANGE UP (ref 130–400)
PMV BLD: 8.8 FL — SIGNIFICANT CHANGE UP (ref 7.4–10.4)
POTASSIUM SERPL-MCNC: 4.3 MMOL/L — SIGNIFICANT CHANGE UP (ref 3.5–5)
POTASSIUM SERPL-SCNC: 4.3 MMOL/L — SIGNIFICANT CHANGE UP (ref 3.5–5)
PROT SERPL-MCNC: 6 G/DL — SIGNIFICANT CHANGE UP (ref 6–8)
PROTHROM AB SERPL-ACNC: 9.7 SEC — LOW (ref 9.95–12.87)
RBC # BLD: 4.47 M/UL — SIGNIFICANT CHANGE UP (ref 4.2–5.4)
RBC # FLD: 16.3 % — HIGH (ref 11.5–14.5)
SODIUM SERPL-SCNC: 132 MMOL/L — LOW (ref 135–146)
WBC # BLD: 36.1 K/UL — HIGH (ref 4.8–10.8)
WBC # FLD AUTO: 36.1 K/UL — HIGH (ref 4.8–10.8)

## 2024-09-09 PROCEDURE — 93460 R&L HRT ART/VENTRICLE ANGIO: CPT | Mod: 26

## 2024-09-09 PROCEDURE — 99232 SBSQ HOSP IP/OBS MODERATE 35: CPT

## 2024-09-09 PROCEDURE — 99233 SBSQ HOSP IP/OBS HIGH 50: CPT

## 2024-09-09 RX ORDER — SODIUM CHLORIDE 9 MG/ML
1000 INJECTION INTRAMUSCULAR; INTRAVENOUS; SUBCUTANEOUS
Refills: 0 | Status: DISCONTINUED | OUTPATIENT
Start: 2024-09-09 | End: 2024-09-10

## 2024-09-09 RX ORDER — DEXTROSE 15 G/33 G
25 GEL IN PACKET (GRAM) ORAL ONCE
Refills: 0 | Status: COMPLETED | OUTPATIENT
Start: 2024-09-09 | End: 2024-09-09

## 2024-09-09 RX ORDER — SODIUM CHLORIDE 9 MG/ML
3 INJECTION INTRAMUSCULAR; INTRAVENOUS; SUBCUTANEOUS EVERY 8 HOURS
Refills: 0 | Status: DISCONTINUED | OUTPATIENT
Start: 2024-09-09 | End: 2024-09-10

## 2024-09-09 RX ORDER — DEXTROSE 15 G/33 G
12 GEL IN PACKET (GRAM) ORAL
Refills: 0 | Status: DISCONTINUED | OUTPATIENT
Start: 2024-09-09 | End: 2024-09-10

## 2024-09-09 RX ORDER — CHLORHEXIDINE GLUCONATE 40 MG/ML
1 SOLUTION TOPICAL ONCE
Refills: 0 | Status: DISCONTINUED | OUTPATIENT
Start: 2024-09-09 | End: 2024-09-10

## 2024-09-09 RX ADMIN — INSULIN GLARGINE 15 UNIT(S): 100 INJECTION, SOLUTION SUBCUTANEOUS at 23:34

## 2024-09-09 RX ADMIN — Medication 12 MILLILITER(S): at 12:35

## 2024-09-09 RX ADMIN — Medication 20 MILLIGRAM(S): at 23:32

## 2024-09-09 RX ADMIN — SODIUM CHLORIDE 3 MILLILITER(S): 9 INJECTION INTRAMUSCULAR; INTRAVENOUS; SUBCUTANEOUS at 23:45

## 2024-09-09 RX ADMIN — Medication 25 MILLILITER(S): at 14:58

## 2024-09-09 RX ADMIN — Medication 40 MILLIGRAM(S): at 05:16

## 2024-09-09 RX ADMIN — CHLORHEXIDINE GLUCONATE 1 APPLICATION(S): 40 SOLUTION TOPICAL at 05:14

## 2024-09-09 RX ADMIN — Medication 81 MILLIGRAM(S): at 11:47

## 2024-09-09 RX ADMIN — CARVEDILOL 3.12 MILLIGRAM(S): 6.25 TABLET ORAL at 05:15

## 2024-09-09 RX ADMIN — LOSARTAN POTASSIUM 100 MILLIGRAM(S): 50 TABLET ORAL at 05:16

## 2024-09-09 RX ADMIN — SODIUM CHLORIDE 75 MILLILITER(S): 9 INJECTION INTRAMUSCULAR; INTRAVENOUS; SUBCUTANEOUS at 19:44

## 2024-09-09 NOTE — PROGRESS NOTE ADULT - SUBJECTIVE AND OBJECTIVE BOX
CATHY SEPULVEDA  88y, Female  Allergy: Alcohol (Hives; Angioedema)  No Known Drug Allergies      CHIEF COMPLAINT: SOB x 1 day (03 Sep 2024 16:53)      HPI:  88F w/PMHx of HTN, DM2, HLD presents to ED with SOB.  Dtr translates reports patient with worsening anxiety x 1-2 days, this AM woke up SOB at rest so activated EMS for transport to ED.  Dtr reports patient developed CP/neck pain described as constant and pressure type on EMS arrival and resolved approx 30m later with o2 administration.  Reports worsening edema over the past 2-3 months, treated with PO Furosemide for a week but stopped.  No abdominal or urinary complaints.  No PND/Orthopnea.  No fever/chills.   (03 Sep 2024 15:37)    HPI:    FAMILY HISTORY:    PAST MEDICAL & SURGICAL HISTORY:  HTN (hypertension)      Diabetes      Vertigo      Status post hip replacement          SOCIAL HISTORY  Social History:  Substance Use (street drugs): ( x ) never used  (  ) other:  Tobacco Usage:  ( x  ) never smoked  Alcohol Usage: None (04 Jun 2024 21:54)      Home Medications:  acarbose 50 mg oral tablet: 1 tab(s) orally 3 times a day (03 Sep 2024 15:42)  carvedilol 3.125 mg oral tablet: 1 tab(s) orally 2 times a day (03 Sep 2024 15:42)  Janumet 50 mg-500 mg oral tablet: 1 tab(s) orally once a day (03 Sep 2024 15:43)  Jardiance 25 mg oral tablet: 1 tab(s) orally once a day (03 Sep 2024 15:43)  Lantus 100 units/mL subcutaneous solution: 10 unit(s) subcutaneous once a day (at bedtime) (03 Sep 2024 15:43)  losartan 100 mg oral tablet: 1 tab(s) orally once a day (03 Sep 2024 15:43)      ROS  General: Denies fevers, chills, nightsweats, weight loss  HEENT: Denies headache, rhinorrhea, sore throat, eye pain  CV: Denies CP, palpitations  PULM: Denies SOB, cough  GI: Denies abdominal pain, diarrhea  : Denies dysuria, hematuria  MSK: Denies arthralgias  SKIN: Denies rash   NEURO: Denies paresthesias, weakness      VITALS:  ICU Vital Signs Last 24 Hrs  T(C): 36.6 (06 Sep 2024 05:00), Max: 36.7 (05 Sep 2024 12:52)  T(F): 97.9 (06 Sep 2024 05:00), Max: 98.1 (05 Sep 2024 12:52)  HR: 69 (06 Sep 2024 05:00) (69 - 83)  BP: 143/70 (06 Sep 2024 05:00) (126/60 - 145/73)  BP(mean): --  ABP: --  ABP(mean): --  RR: 18 (05 Sep 2024 20:39) (16 - 18)  SpO2: 95% (05 Sep 2024 20:39) (95% - 99%)    O2 Parameters below as of 05 Sep 2024 20:39  Patient On (Oxygen Delivery Method): room air                      PHYSICAL EXAM:  Gen: NAD, resting in bed  HEENT: Normocephalic, atraumatic  Neck: supple, no lymphadenopathy  CV: Regular rate & regular rhythm  Lungs: CTABL no wheeze  Abdomen: Soft, NTND+ BS present  Ext: Warm, well perfused + le edema pitting improved   Neuro: non focal, awake, CN II-XII intact       TESTS & MEASUREMENTS:    CARDIOLOGY TESTING  12 Lead ECG:   Ventricular Rate 59 BPM    Atrial Rate 59 BPM    P-R Interval 136 ms    QRS Duration 78 ms                            12.9   41.92 )-----------( 259      ( 06 Sep 2024 07:28 )             39.5       09-06    129<L>  |  93<L>  |  25<H>  ----------------------------<  160<H>  4.7   |  27  |  0.5<L>    Ca    8.4      06 Sep 2024 07:28                Urinalysis Basic - ( 06 Sep 2024 07:28 )    Color: x / Appearance: x / SG: x / pH: x  Gluc: 160 mg/dL / Ketone: x  / Bili: x / Urobili: x   Blood: x / Protein: x / Nitrite: x   Leuk Esterase: x / RBC: x / WBC x   Sq Epi: x / Non Sq Epi: x / Bacteria: x            Lactate Trend            CAPILLARY BLOOD GLUCOSE      POCT Blood Glucose.: 320 mg/dL (07 Sep 2024 11:55)        Q-T Interval 500 ms    QTC Calculation(Bazett) 495 ms    P Axis 11 degrees    R Axis -51 degrees    T Axis 220 degrees    Diagnosis Line Sinus bradycardia  Left anterior fascicular block  Anteroseptal infarct , age undetermined  T wave abnormality, consider inferolateral ischemia  Abnormal ECG    Confirmed by Sanket España ([id number]) on 9/4/2024 8:07:07 AM (09-04-24 @ 07:30)  12 Lead ECG:   Ventricular Rate 78 BPM    Atrial Rate 78 BPM    P-R Interval 146 ms    QRS Duration 76 ms    Q-T Interval 390 ms    QTC Calculation(Bazett) 444 ms    P Axis 36 degrees    R Axis -63 degrees    T Axis 47 degrees    Diagnosis Line Normal sinus rhythm  Possible Left atrial enlargement  Left anterior fascicular block  Septal infarct , age undetermined  Possible Lateral infarct , age undetermined  Abnormal ECG    Confirmed by Sanket España ([id number]) on 9/3/2024 3:26:34 PM (09-03-24 @ 14:47)      MEDICATIONS  (STANDING):  atorvastatin 20 milliGRAM(s) Oral at bedtime  carvedilol 3.125 milliGRAM(s) Oral every 12 hours  chlorhexidine 2% Cloths 1 Application(s) Topical <User Schedule>  dextrose 5%. 1000 milliLiter(s) (50 mL/Hr) IV Continuous <Continuous>  dextrose 5%. 1000 milliLiter(s) (100 mL/Hr) IV Continuous <Continuous>  dextrose 50% Injectable 25 Gram(s) IV Push once  dextrose 50% Injectable 12.5 Gram(s) IV Push once  dextrose 50% Injectable 25 Gram(s) IV Push once  enoxaparin Injectable 40 milliGRAM(s) SubCutaneous every 24 hours  furosemide   Injectable 40 milliGRAM(s) IV Push daily  glucagon  Injectable 1 milliGRAM(s) IntraMuscular once  insulin glargine Injectable (LANTUS) 10 Unit(s) SubCutaneous at bedtime  insulin lispro (ADMELOG) corrective regimen sliding scale   SubCutaneous three times a day before meals  losartan 100 milliGRAM(s) Oral daily  regadenoson Injectable 0.4 milliGRAM(s) IV Push once    MEDICATIONS  (PRN):  acetaminophen     Tablet .. 650 milliGRAM(s) Oral every 6 hours PRN Temp greater or equal to 38C (100.4F), Mild Pain (1 - 3)  aluminum hydroxide/magnesium hydroxide/simethicone Suspension 30 milliLiter(s) Oral every 4 hours PRN Dyspepsia  dextrose Oral Gel 15 Gram(s) Oral once PRN Blood Glucose LESS THAN 70 milliGRAM(s)/deciliter  melatonin 3 milliGRAM(s) Oral at bedtime PRN Insomnia  ondansetron Injectable 4 milliGRAM(s) IV Push every 8 hours PRN Nausea and/or Vomiting      ANTIBIOTICS:      All available historical data has been reviewed    ASSESSMENT  88y F admitted with Heart failure        PROBLEMS  
    SEPULVEDALAZX  88y, Female  Allergy: Alcohol (Hives; Angioedema)  No Known Drug Allergies      CHIEF COMPLAINT: SOB x 1 day (03 Sep 2024 16:53)      HPI:  88F w/PMHx of HTN, DM2, HLD presents to ED with SOB.  Dtr translates reports patient with worsening anxiety x 1-2 days, this AM woke up SOB at rest so activated EMS for transport to ED.  Dtr reports patient developed CP/neck pain described as constant and pressure type on EMS arrival and resolved approx 30m later with o2 administration.  Reports worsening edema over the past 2-3 months, treated with PO Furosemide for a week but stopped.  No abdominal or urinary complaints.  No PND/Orthopnea.  No fever/chills.   (03 Sep 2024 15:37)    HPI:    FAMILY HISTORY:    PAST MEDICAL & SURGICAL HISTORY:  HTN (hypertension)      Diabetes      Vertigo      Status post hip replacement          SOCIAL HISTORY  Social History:  Substance Use (street drugs): ( x ) never used  (  ) other:  Tobacco Usage:  ( x  ) never smoked  Alcohol Usage: None (04 Jun 2024 21:54)      Home Medications:  acarbose 50 mg oral tablet: 1 tab(s) orally 3 times a day (03 Sep 2024 15:42)  carvedilol 3.125 mg oral tablet: 1 tab(s) orally 2 times a day (03 Sep 2024 15:42)  Janumet 50 mg-500 mg oral tablet: 1 tab(s) orally once a day (03 Sep 2024 15:43)  Jardiance 25 mg oral tablet: 1 tab(s) orally once a day (03 Sep 2024 15:43)  Lantus 100 units/mL subcutaneous solution: 10 unit(s) subcutaneous once a day (at bedtime) (03 Sep 2024 15:43)  losartan 100 mg oral tablet: 1 tab(s) orally once a day (03 Sep 2024 15:43)      ROS  General: Denies fevers, chills, nightsweats, weight loss  HEENT: Denies headache, rhinorrhea, sore throat, eye pain  CV: Denies CP, palpitations  PULM: Denies SOB, cough  GI: Denies abdominal pain, diarrhea +constipation   : Denies dysuria, hematuria  MSK: Denies arthralgias  SKIN: Denies rash   NEURO: Denies paresthesias, weakness      VITALS:  ICU Vital Signs Last 24 Hrs  T(C): 36.7 (05 Sep 2024 12:52), Max: 36.7 (05 Sep 2024 12:52)  T(F): 98.1 (05 Sep 2024 12:52), Max: 98.1 (05 Sep 2024 12:52)  HR: 74 (05 Sep 2024 12:52) (59 - 74)  BP: 126/60 (05 Sep 2024 12:52) (123/59 - 132/71)  BP(mean): 80 (04 Sep 2024 18:06) (80 - 80)  ABP: --  ABP(mean): --  RR: 16 (05 Sep 2024 12:52) (16 - 18)  SpO2: 95% (05 Sep 2024 14:44) (95% - 99%)    O2 Parameters below as of 05 Sep 2024 14:44  Patient On (Oxygen Delivery Method): room air              PHYSICAL EXAM:  Gen: NAD, resting in bed  HEENT: Normocephalic, atraumatic  Neck: supple, no lymphadenopathy  CV: Regular rate & regular rhythm  Lungs: CTABL no wheeze  Abdomen: Soft, NTND+ BS present  Ext: Warm, well perfused + le edema pitting  Neuro: non focal, awake, CN II-XII intact       TESTS & MEASUREMENTS:                                     13.6   46.70 )-----------( 353      ( 04 Sep 2024 06:46 )             41.7     09-04    127<L>  |  93<L>  |  28<H>  ----------------------------<  218<H>  5.3<H>   |  26  |  0.8    Ca    8.6      04 Sep 2024 06:46    TPro  6.0  /  Alb  3.1<L>  /  TBili  0.2  /  DBili  x   /  AST  55<H>  /  ALT  56<H>  /  AlkPhos  125<H>  09-04      LIVER FUNCTIONS - ( 04 Sep 2024 06:46 )  Alb: 3.1 g/dL / Pro: 6.0 g/dL / ALK PHOS: 125 U/L / ALT: 56 U/L / AST: 55 U/L / GGT: x           Urinalysis Basic - ( 04 Sep 2024 06:46 )    Color: x / Appearance: x / SG: x / pH: x  Gluc: 218 mg/dL / Ketone: x  / Bili: x / Urobili: x   Blood: x / Protein: x / Nitrite: x   Leuk Esterase: x / RBC: x / WBC x   Sq Epi: x / Non Sq Epi: x / Bacteria: x          Blood Gas Venous - Lactate: 1.9 mmol/L (09-03-24 @ 11:41)    QRS axis to [] ° and NSR at a rate of [] BPM. There was no atrial enlargement. There was no ventricular hypertrophy. There were no ST-T changes and all intervals were normal.      INFECTIOUS DISEASES TESTING      RADIOLOGY & ADDITIONAL TESTS:  I have personally reviewed the last Chest xray  CXR      CT      CARDIOLOGY TESTING  12 Lead ECG:   Ventricular Rate 59 BPM    Atrial Rate 59 BPM    P-R Interval 136 ms    QRS Duration 78 ms    Q-T Interval 500 ms    QTC Calculation(Bazett) 495 ms    P Axis 11 degrees    R Axis -51 degrees    T Axis 220 degrees    Diagnosis Line Sinus bradycardia  Left anterior fascicular block  Anteroseptal infarct , age undetermined  T wave abnormality, consider inferolateral ischemia  Abnormal ECG    Confirmed by Sanket España ([id number]) on 9/4/2024 8:07:07 AM (09-04-24 @ 07:30)  12 Lead ECG:   Ventricular Rate 78 BPM    Atrial Rate 78 BPM    P-R Interval 146 ms    QRS Duration 76 ms    Q-T Interval 390 ms    QTC Calculation(Bazett) 444 ms    P Axis 36 degrees    R Axis -63 degrees    T Axis 47 degrees    Diagnosis Line Normal sinus rhythm  Possible Left atrial enlargement  Left anterior fascicular block  Septal infarct , age undetermined  Possible Lateral infarct , age undetermined  Abnormal ECG    Confirmed by Sanket España ([id number]) on 9/3/2024 3:26:34 PM (09-03-24 @ 14:47)      MEDICATIONS  (STANDING):  atorvastatin 20 milliGRAM(s) Oral at bedtime  carvedilol 3.125 milliGRAM(s) Oral every 12 hours  chlorhexidine 2% Cloths 1 Application(s) Topical <User Schedule>  dextrose 5%. 1000 milliLiter(s) (50 mL/Hr) IV Continuous <Continuous>  dextrose 5%. 1000 milliLiter(s) (100 mL/Hr) IV Continuous <Continuous>  dextrose 50% Injectable 25 Gram(s) IV Push once  dextrose 50% Injectable 12.5 Gram(s) IV Push once  dextrose 50% Injectable 25 Gram(s) IV Push once  enoxaparin Injectable 40 milliGRAM(s) SubCutaneous every 24 hours  furosemide   Injectable 40 milliGRAM(s) IV Push daily  glucagon  Injectable 1 milliGRAM(s) IntraMuscular once  insulin glargine Injectable (LANTUS) 10 Unit(s) SubCutaneous at bedtime  insulin lispro (ADMELOG) corrective regimen sliding scale   SubCutaneous three times a day before meals  losartan 100 milliGRAM(s) Oral daily  regadenoson Injectable 0.4 milliGRAM(s) IV Push once    MEDICATIONS  (PRN):  acetaminophen     Tablet .. 650 milliGRAM(s) Oral every 6 hours PRN Temp greater or equal to 38C (100.4F), Mild Pain (1 - 3)  aluminum hydroxide/magnesium hydroxide/simethicone Suspension 30 milliLiter(s) Oral every 4 hours PRN Dyspepsia  dextrose Oral Gel 15 Gram(s) Oral once PRN Blood Glucose LESS THAN 70 milliGRAM(s)/deciliter  melatonin 3 milliGRAM(s) Oral at bedtime PRN Insomnia  ondansetron Injectable 4 milliGRAM(s) IV Push every 8 hours PRN Nausea and/or Vomiting      ANTIBIOTICS:      All available historical data has been reviewed    ASSESSMENT  88y F admitted with Heart failure        PROBLEMS  
    CATHY SEPULVEDA  88y, Female  Allergy: Alcohol (Hives; Angioedema)  No Known Drug Allergies      CHIEF COMPLAINT: SOB x 1 day (03 Sep 2024 16:53)      HPI:  88F w/PMHx of HTN, DM2, HLD presents to ED with SOB.  Dtr translates reports patient with worsening anxiety x 1-2 days, this AM woke up SOB at rest so activated EMS for transport to ED.  Dtr reports patient developed CP/neck pain described as constant and pressure type on EMS arrival and resolved approx 30m later with o2 administration.  Reports worsening edema over the past 2-3 months, treated with PO Furosemide for a week but stopped.  No abdominal or urinary complaints.  No PND/Orthopnea.  No fever/chills.   (03 Sep 2024 15:37)    HPI:    FAMILY HISTORY:    PAST MEDICAL & SURGICAL HISTORY:  HTN (hypertension)      Diabetes      Vertigo      Status post hip replacement          SOCIAL HISTORY  Social History:  Substance Use (street drugs): ( x ) never used  (  ) other:  Tobacco Usage:  ( x  ) never smoked  Alcohol Usage: None (04 Jun 2024 21:54)      Home Medications:  acarbose 50 mg oral tablet: 1 tab(s) orally 3 times a day (03 Sep 2024 15:42)  carvedilol 3.125 mg oral tablet: 1 tab(s) orally 2 times a day (03 Sep 2024 15:42)  Janumet 50 mg-500 mg oral tablet: 1 tab(s) orally once a day (03 Sep 2024 15:43)  Jardiance 25 mg oral tablet: 1 tab(s) orally once a day (03 Sep 2024 15:43)  Lantus 100 units/mL subcutaneous solution: 10 unit(s) subcutaneous once a day (at bedtime) (03 Sep 2024 15:43)  losartan 100 mg oral tablet: 1 tab(s) orally once a day (03 Sep 2024 15:43)      ROS  General: Denies fevers, chills, nightsweats, weight loss  HEENT: Denies headache, rhinorrhea, sore throat, eye pain  CV: Denies CP, palpitations  PULM: Denies SOB, cough  GI: Denies abdominal pain, diarrhea  : Denies dysuria, hematuria  MSK: Denies arthralgias  SKIN: Denies rash   NEURO: Denies paresthesias, weakness      VITALS:    T(C): 36.8 (09 Sep 2024 05:06), Max: 37.7 (08 Sep 2024 17:07)  T(F): 98.2 (09 Sep 2024 05:06), Max: 99.9 (08 Sep 2024 17:07)  HR: 72 (09 Sep 2024 05:06) (69 - 75)  BP: 151/70 (09 Sep 2024 05:06) (120/57 - 151/70)  RR: 16 (09 Sep 2024 05:06) (16 - 16)  SpO2: 94% (09 Sep 2024 05:06) (94% - 97%)    O2 Parameters below as of 09 Sep 2024 05:06  Patient On (Oxygen Delivery Method): room air                                PHYSICAL EXAM:  Gen: NAD, resting in bed  HEENT: Normocephalic, atraumatic  Neck: supple, no lymphadenopathy  CV: Regular rate & regular rhythm  Lungs: CTABL no wheeze  Abdomen: Soft, NTND+ BS present  Ext: Warm, well perfused + le edema pitting   Neuro: non focal, awake, CN II-XII intact       TESTS & MEASUREMENTS:    CARDIOLOGY TESTING  12 Lead ECG:   Ventricular Rate 59 BPM          MEDICATIONS  (STANDING):  atorvastatin 20 milliGRAM(s) Oral at bedtime  carvedilol 3.125 milliGRAM(s) Oral every 12 hours  chlorhexidine 2% Cloths 1 Application(s) Topical <User Schedule>  dextrose 5%. 1000 milliLiter(s) (50 mL/Hr) IV Continuous <Continuous>  dextrose 5%. 1000 milliLiter(s) (100 mL/Hr) IV Continuous <Continuous>  dextrose 50% Injectable 25 Gram(s) IV Push once  dextrose 50% Injectable 12.5 Gram(s) IV Push once  dextrose 50% Injectable 25 Gram(s) IV Push once  enoxaparin Injectable 40 milliGRAM(s) SubCutaneous every 24 hours  furosemide   Injectable 40 milliGRAM(s) IV Push daily  glucagon  Injectable 1 milliGRAM(s) IntraMuscular once  insulin glargine Injectable (LANTUS) 10 Unit(s) SubCutaneous at bedtime  insulin lispro (ADMELOG) corrective regimen sliding scale   SubCutaneous three times a day before meals  losartan 100 milliGRAM(s) Oral daily  regadenoson Injectable 0.4 milliGRAM(s) IV Push once    MEDICATIONS  (PRN):  acetaminophen     Tablet .. 650 milliGRAM(s) Oral every 6 hours PRN Temp greater or equal to 38C (100.4F), Mild Pain (1 - 3)  aluminum hydroxide/magnesium hydroxide/simethicone Suspension 30 milliLiter(s) Oral every 4 hours PRN Dyspepsia  dextrose Oral Gel 15 Gram(s) Oral once PRN Blood Glucose LESS THAN 70 milliGRAM(s)/deciliter  melatonin 3 milliGRAM(s) Oral at bedtime PRN Insomnia  ondansetron Injectable 4 milliGRAM(s) IV Push every 8 hours PRN Nausea and/or Vomiting      ANTIBIOTICS:      All available historical data has been reviewed    ASSESSMENT  88y F admitted with Heart failure        PROBLEMS  
    SEPULVEDALAZX  88y, Female  Allergy: Alcohol (Hives; Angioedema)  No Known Drug Allergies      CHIEF COMPLAINT: SOB x 1 day (03 Sep 2024 16:53)      HPI:  88F w/PMHx of HTN, DM2, HLD presents to ED with SOB.  Dtr translates reports patient with worsening anxiety x 1-2 days, this AM woke up SOB at rest so activated EMS for transport to ED.  Dtr reports patient developed CP/neck pain described as constant and pressure type on EMS arrival and resolved approx 30m later with o2 administration.  Reports worsening edema over the past 2-3 months, treated with PO Furosemide for a week but stopped.  No abdominal or urinary complaints.  No PND/Orthopnea.  No fever/chills.   (03 Sep 2024 15:37)    HPI:    FAMILY HISTORY:    PAST MEDICAL & SURGICAL HISTORY:  HTN (hypertension)      Diabetes      Vertigo      Status post hip replacement          SOCIAL HISTORY  Social History:  Substance Use (street drugs): ( x ) never used  (  ) other:  Tobacco Usage:  ( x  ) never smoked  Alcohol Usage: None (04 Jun 2024 21:54)      Home Medications:  acarbose 50 mg oral tablet: 1 tab(s) orally 3 times a day (03 Sep 2024 15:42)  carvedilol 3.125 mg oral tablet: 1 tab(s) orally 2 times a day (03 Sep 2024 15:42)  Janumet 50 mg-500 mg oral tablet: 1 tab(s) orally once a day (03 Sep 2024 15:43)  Jardiance 25 mg oral tablet: 1 tab(s) orally once a day (03 Sep 2024 15:43)  Lantus 100 units/mL subcutaneous solution: 10 unit(s) subcutaneous once a day (at bedtime) (03 Sep 2024 15:43)  losartan 100 mg oral tablet: 1 tab(s) orally once a day (03 Sep 2024 15:43)      ROS  General: Denies fevers, chills, nightsweats, weight loss  HEENT: Denies headache, rhinorrhea, sore throat, eye pain  CV: Denies CP, palpitations  PULM: Denies SOB, cough  GI: Denies abdominal pain, diarrhea +constipation   : Denies dysuria, hematuria  MSK: Denies arthralgias  SKIN: Denies rash   NEURO: Denies paresthesias, weakness      VITALS:  T(F): 97.7, Max: 97.8 (09-03-24 @ 20:10)  HR: 58  BP: 129/71  RR: 18Vital Signs Last 24 Hrs  T(C): 36.5 (04 Sep 2024 04:30), Max: 36.6 (03 Sep 2024 20:10)  T(F): 97.7 (04 Sep 2024 04:30), Max: 97.8 (03 Sep 2024 20:10)  HR: 58 (04 Sep 2024 07:19) (58 - 77)  BP: 129/71 (04 Sep 2024 04:30) (117/71 - 228/125)  BP(mean): --  RR: 18 (04 Sep 2024 07:19) (18 - 24)  SpO2: 98% (04 Sep 2024 07:19) (96% - 100%)    Parameters below as of 04 Sep 2024 07:19  Patient On (Oxygen Delivery Method): nasal cannula  O2 Flow (L/min): 3      PHYSICAL EXAM:  Gen: NAD, resting in bed  HEENT: Normocephalic, atraumatic  Neck: supple, no lymphadenopathy  CV: Regular rate & regular rhythm  Lungs: CTABL no wheeze  Abdomen: Soft, NTND+ BS present  Ext: Warm, well perfused + le edema pitting  Neuro: non focal, awake, CN II-XII intact       TESTS & MEASUREMENTS:                        13.6   46.70 )-----------( 353      ( 04 Sep 2024 06:46 )             41.7     09-04    127<L>  |  93<L>  |  28<H>  ----------------------------<  218<H>  5.3<H>   |  26  |  0.8    Ca    8.6      04 Sep 2024 06:46    TPro  6.0  /  Alb  3.1<L>  /  TBili  0.2  /  DBili  x   /  AST  55<H>  /  ALT  56<H>  /  AlkPhos  125<H>  09-04      LIVER FUNCTIONS - ( 04 Sep 2024 06:46 )  Alb: 3.1 g/dL / Pro: 6.0 g/dL / ALK PHOS: 125 U/L / ALT: 56 U/L / AST: 55 U/L / GGT: x           Urinalysis Basic - ( 04 Sep 2024 06:46 )    Color: x / Appearance: x / SG: x / pH: x  Gluc: 218 mg/dL / Ketone: x  / Bili: x / Urobili: x   Blood: x / Protein: x / Nitrite: x   Leuk Esterase: x / RBC: x / WBC x   Sq Epi: x / Non Sq Epi: x / Bacteria: x          Blood Gas Venous - Lactate: 1.9 mmol/L (09-03-24 @ 11:41)    QRS axis to [] ° and NSR at a rate of [] BPM. There was no atrial enlargement. There was no ventricular hypertrophy. There were no ST-T changes and all intervals were normal.      INFECTIOUS DISEASES TESTING      RADIOLOGY & ADDITIONAL TESTS:  I have personally reviewed the last Chest xray  CXR      CT      CARDIOLOGY TESTING  12 Lead ECG:   Ventricular Rate 59 BPM    Atrial Rate 59 BPM    P-R Interval 136 ms    QRS Duration 78 ms    Q-T Interval 500 ms    QTC Calculation(Bazett) 495 ms    P Axis 11 degrees    R Axis -51 degrees    T Axis 220 degrees    Diagnosis Line Sinus bradycardia  Left anterior fascicular block  Anteroseptal infarct , age undetermined  T wave abnormality, consider inferolateral ischemia  Abnormal ECG    Confirmed by Sanket España ([id number]) on 9/4/2024 8:07:07 AM (09-04-24 @ 07:30)  12 Lead ECG:   Ventricular Rate 78 BPM    Atrial Rate 78 BPM    P-R Interval 146 ms    QRS Duration 76 ms    Q-T Interval 390 ms    QTC Calculation(Bazett) 444 ms    P Axis 36 degrees    R Axis -63 degrees    T Axis 47 degrees    Diagnosis Line Normal sinus rhythm  Possible Left atrial enlargement  Left anterior fascicular block  Septal infarct , age undetermined  Possible Lateral infarct , age undetermined  Abnormal ECG    Confirmed by Sanket España ([id number]) on 9/3/2024 3:26:34 PM (09-03-24 @ 14:47)      MEDICATIONS  (STANDING):  atorvastatin 20 milliGRAM(s) Oral at bedtime  carvedilol 3.125 milliGRAM(s) Oral every 12 hours  chlorhexidine 2% Cloths 1 Application(s) Topical <User Schedule>  dextrose 5%. 1000 milliLiter(s) (50 mL/Hr) IV Continuous <Continuous>  dextrose 5%. 1000 milliLiter(s) (100 mL/Hr) IV Continuous <Continuous>  dextrose 50% Injectable 25 Gram(s) IV Push once  dextrose 50% Injectable 12.5 Gram(s) IV Push once  dextrose 50% Injectable 25 Gram(s) IV Push once  enoxaparin Injectable 40 milliGRAM(s) SubCutaneous every 24 hours  furosemide   Injectable 40 milliGRAM(s) IV Push daily  glucagon  Injectable 1 milliGRAM(s) IntraMuscular once  insulin glargine Injectable (LANTUS) 10 Unit(s) SubCutaneous at bedtime  insulin lispro (ADMELOG) corrective regimen sliding scale   SubCutaneous three times a day before meals  losartan 100 milliGRAM(s) Oral daily  regadenoson Injectable 0.4 milliGRAM(s) IV Push once    MEDICATIONS  (PRN):  acetaminophen     Tablet .. 650 milliGRAM(s) Oral every 6 hours PRN Temp greater or equal to 38C (100.4F), Mild Pain (1 - 3)  aluminum hydroxide/magnesium hydroxide/simethicone Suspension 30 milliLiter(s) Oral every 4 hours PRN Dyspepsia  dextrose Oral Gel 15 Gram(s) Oral once PRN Blood Glucose LESS THAN 70 milliGRAM(s)/deciliter  melatonin 3 milliGRAM(s) Oral at bedtime PRN Insomnia  ondansetron Injectable 4 milliGRAM(s) IV Push every 8 hours PRN Nausea and/or Vomiting      ANTIBIOTICS:      All available historical data has been reviewed    ASSESSMENT  88y F admitted with Heart failure        PROBLEMS  
Subjective/Objective:     88 years old female admitted for heart failure    MEDICATIONS  (STANDING):  aspirin enteric coated 81 milliGRAM(s) Oral daily  atorvastatin 20 milliGRAM(s) Oral at bedtime  carvedilol 3.125 milliGRAM(s) Oral every 12 hours  chlorhexidine 2% Cloths 1 Application(s) Topical <User Schedule>  dextrose 5%. 1000 milliLiter(s) (50 mL/Hr) IV Continuous <Continuous>  dextrose 5%. 1000 milliLiter(s) (100 mL/Hr) IV Continuous <Continuous>  dextrose 50% Injectable 25 Gram(s) IV Push once  dextrose 50% Injectable 12.5 Gram(s) IV Push once  dextrose 50% Injectable 25 Gram(s) IV Push once  enoxaparin Injectable 40 milliGRAM(s) SubCutaneous every 24 hours  furosemide    Tablet 40 milliGRAM(s) Oral daily  glucagon  Injectable 1 milliGRAM(s) IntraMuscular once  insulin glargine Injectable (LANTUS) 10 Unit(s) SubCutaneous at bedtime  insulin lispro (ADMELOG) corrective regimen sliding scale   SubCutaneous three times a day before meals  losartan 100 milliGRAM(s) Oral daily  polyethylene glycol 3350 17 Gram(s) Oral daily  regadenoson Injectable 0.4 milliGRAM(s) IV Push once  senna 2 Tablet(s) Oral at bedtime    MEDICATIONS  (PRN):  acetaminophen     Tablet .. 650 milliGRAM(s) Oral every 6 hours PRN Temp greater or equal to 38C (100.4F), Mild Pain (1 - 3)  aluminum hydroxide/magnesium hydroxide/simethicone Suspension 30 milliLiter(s) Oral every 4 hours PRN Dyspepsia  dextrose Oral Gel 15 Gram(s) Oral once PRN Blood Glucose LESS THAN 70 milliGRAM(s)/deciliter  melatonin 3 milliGRAM(s) Oral at bedtime PRN Insomnia  ondansetron Injectable 4 milliGRAM(s) IV Push every 8 hours PRN Nausea and/or Vomiting          Vital Signs Last 24 Hrs  T(C): 36.7 (06 Sep 2024 14:00), Max: 36.7 (06 Sep 2024 14:00)  T(F): 98 (06 Sep 2024 14:00), Max: 98 (06 Sep 2024 14:00)  HR: 68 (06 Sep 2024 14:00) (68 - 83)  BP: 134/63 (06 Sep 2024 14:00) (134/63 - 145/73)  BP(mean): --  RR: 17 (06 Sep 2024 14:00) (17 - 18)  SpO2: 96% (06 Sep 2024 14:00) (95% - 96%)    Parameters below as of 06 Sep 2024 14:00  Patient On (Oxygen Delivery Method): room air      I&O's Detail    05 Sep 2024 07:01  -  06 Sep 2024 07:00  --------------------------------------------------------  IN:    Oral Fluid: 660 mL  Total IN: 660 mL    OUT:    Voided (mL): 1100 mL  Total OUT: 1100 mL    Total NET: -440 mL      06 Sep 2024 07:01  -  06 Sep 2024 15:37  --------------------------------------------------------  IN:  Total IN: 0 mL    OUT:    Voided (mL): 250 mL  Total OUT: 250 mL    Total NET: -250 mL              GENERAL:  87y/o Female NAD, resting comfortably.  HEAD:  Atraumatic, Normocephalic  EYES: EOMI, PERRLA, conjunctiva and sclera clear  NECK: Supple, No JVD, no cervical lymphadenopathy, non-tender  CHEST/LUNG: Clear to auscultation bilaterally; No wheeze, rhonchi, or rales  HEART: Regular rate and rhythm; S1&S2  ABDOMEN: Soft, Nontender, Nondistended x 4 quadrants; Bowel sounds present  EXTREMITIES:   Peripheral Pulses Present, No clubbing, no cyanosis, or no edema, no calf tenderness  PSYCH: AAOx3, cooperative, appropriate  NEUROLOGY: WNL  SKIN: WNL      EKG/ TELEM:    LABS:                          12.9   41.92 )-----------( 259      ( 06 Sep 2024 07:28 )             39.5       06 Sep 2024 07:28    129<L>  |  93<L>  |  25<H>  ----------------------------<  160<H>  4.7     |  27     |  0.5<L>    Ca    8.4        06 Sep 2024 07:28                        Diagnostic testing:    < from: NM Nuclear Stress Pharmacologic Multiple (09.04.24 @ 14:47) >  Impression:  1.   Small to moderate size severe reversible defect in the inferolateral   wall and small reversible defect in the anteroseptal wall of the left   ventricle consistent with ischemia and suggesting ischemia in multiple   coronary territories.  2.  Dilated left ventricle with mild global hypokinesis. All walls of the   left ventricle thicken.  3.  Left ventricular ejection fraction calculated as 46% which is low.   Wall motion analysis suggests ejection fraction of 40-45%.   Echocardiographic ejection fraction by visual estimation 50-55% on   09/04/2024    --- End of Report ---    < end of copied text >      Assessment and Plan:    < from: TTE Echo Complete w/o Contrast w/ Doppler (09.04.24 @ 07:37) >  Summary:   1. Left ventricular ejection fraction, by visual estimation, is 50 to   55%.   2. Mildly decreased segmental left ventricular systolic function.   3. Mid and apical anterior septum, apical inferior segment, and apex are   abnormal as described above.   4. Mild left ventricular hypertrophy.   5. Normal right ventricular size and function.   6. Mild to moderately enlarged left atrium.   7. Mild mitral annular calcification.   8. Mild to moderate mitral valve regurgitation.   9. Moderate tricuspid regurgitation.  10. Mild aortic regurgitation.  11. Moderate to severe aortic valve stenosis.  12. Mild pulmonic valve regurgitation.  13. There is mild aortic root calcification.    PHYSICIAN INTERPRETATION:  Left Ventricle: The left ventricular internal cavity size is normal.   There is mild left ventricular hypertrophy. Left ventricular ejection   fraction, by visual estimation, is 50 to 55%. Mildly decreased segmental   left ventricular systolic function.      LV Wall Scoring:  The mid and apical anterior septum, apical inferior segment, and apex are  hypokinetic. All remaining scored segments are normal.    Right Ventricle: Normal right ventricular size and function.  Left Atrium: Mild to moderately enlarged left atrium.  Pericardium: There is a small pleural effusion in both left and right   lateral regions.  Mitral Valve: There is mild mitral annular calcification. Mild to   moderate mitral valve regurgitation is seen.  Tricuspid Valve: Moderate tricuspid regurgitation is visualized.  Aortic Valve: Moderate to severe aortic stenosis is present. Mild aortic   valve regurgitation is seen.  Pulmonic Valve: Mild pulmonic valve regurgitation.  Aorta: There is mild aortic root calcification.    < end of copied text >      
    CATHY SEPULVEDA  88y, Female  Allergy: Alcohol (Hives; Angioedema)  No Known Drug Allergies      CHIEF COMPLAINT: SOB x 1 day (03 Sep 2024 16:53)      HPI:  88F w/PMHx of HTN, DM2, HLD presents to ED with SOB.  Dtr translates reports patient with worsening anxiety x 1-2 days, this AM woke up SOB at rest so activated EMS for transport to ED.  Dtr reports patient developed CP/neck pain described as constant and pressure type on EMS arrival and resolved approx 30m later with o2 administration.  Reports worsening edema over the past 2-3 months, treated with PO Furosemide for a week but stopped.  No abdominal or urinary complaints.  No PND/Orthopnea.  No fever/chills.   (03 Sep 2024 15:37)    HPI:    FAMILY HISTORY:    PAST MEDICAL & SURGICAL HISTORY:  HTN (hypertension)      Diabetes      Vertigo      Status post hip replacement          SOCIAL HISTORY  Social History:  Substance Use (street drugs): ( x ) never used  (  ) other:  Tobacco Usage:  ( x  ) never smoked  Alcohol Usage: None (04 Jun 2024 21:54)      Home Medications:  acarbose 50 mg oral tablet: 1 tab(s) orally 3 times a day (03 Sep 2024 15:42)  carvedilol 3.125 mg oral tablet: 1 tab(s) orally 2 times a day (03 Sep 2024 15:42)  Janumet 50 mg-500 mg oral tablet: 1 tab(s) orally once a day (03 Sep 2024 15:43)  Jardiance 25 mg oral tablet: 1 tab(s) orally once a day (03 Sep 2024 15:43)  Lantus 100 units/mL subcutaneous solution: 10 unit(s) subcutaneous once a day (at bedtime) (03 Sep 2024 15:43)  losartan 100 mg oral tablet: 1 tab(s) orally once a day (03 Sep 2024 15:43)      ROS  General: Denies fevers, chills, nightsweats, weight loss  HEENT: Denies headache, rhinorrhea, sore throat, eye pain  CV: Denies CP, palpitations  PULM: Denies SOB, cough  GI: Denies abdominal pain, diarrhea  : Denies dysuria, hematuria  MSK: Denies arthralgias  SKIN: Denies rash   NEURO: Denies paresthesias, weakness      VITALS:  ICU Vital Signs Last 24 Hrs  T(C): 36.6 (06 Sep 2024 05:00), Max: 36.7 (05 Sep 2024 12:52)  T(F): 97.9 (06 Sep 2024 05:00), Max: 98.1 (05 Sep 2024 12:52)  HR: 69 (06 Sep 2024 05:00) (69 - 83)  BP: 143/70 (06 Sep 2024 05:00) (126/60 - 145/73)  BP(mean): --  ABP: --  ABP(mean): --  RR: 18 (05 Sep 2024 20:39) (16 - 18)  SpO2: 95% (05 Sep 2024 20:39) (95% - 99%)    O2 Parameters below as of 05 Sep 2024 20:39  Patient On (Oxygen Delivery Method): room air                      PHYSICAL EXAM:  Gen: NAD, resting in bed  HEENT: Normocephalic, atraumatic  Neck: supple, no lymphadenopathy  CV: Regular rate & regular rhythm  Lungs: CTABL no wheeze  Abdomen: Soft, NTND+ BS present  Ext: Warm, well perfused + le edema pitting improved   Neuro: non focal, awake, CN II-XII intact       TESTS & MEASUREMENTS:  09-06    129<L>  |  93<L>  |  25<H>  ----------------------------<  160<H>  4.7   |  27  |  0.5<L>    Ca    8.4      06 Sep 2024 07:28                            12.9   41.92 )-----------( 259      ( 06 Sep 2024 07:28 )             39.5                                        13.6   46.70 )-----------( 353      ( 04 Sep 2024 06:46 )             41.7     09-04    127<L>  |  93<L>  |  28<H>  ----------------------------<  218<H>  5.3<H>   |  26  |  0.8    Ca    8.6      04 Sep 2024 06:46    TPro  6.0  /  Alb  3.1<L>  /  TBili  0.2  /  DBili  x   /  AST  55<H>  /  ALT  56<H>  /  AlkPhos  125<H>  09-04      LIVER FUNCTIONS - ( 04 Sep 2024 06:46 )  Alb: 3.1 g/dL / Pro: 6.0 g/dL / ALK PHOS: 125 U/L / ALT: 56 U/L / AST: 55 U/L / GGT: x           Urinalysis Basic - ( 04 Sep 2024 06:46 )    Color: x / Appearance: x / SG: x / pH: x  Gluc: 218 mg/dL / Ketone: x  / Bili: x / Urobili: x   Blood: x / Protein: x / Nitrite: x   Leuk Esterase: x / RBC: x / WBC x   Sq Epi: x / Non Sq Epi: x / Bacteria: x          Blood Gas Venous - Lactate: 1.9 mmol/L (09-03-24 @ 11:41)    QRS axis to [] ° and NSR at a rate of [] BPM. There was no atrial enlargement. There was no ventricular hypertrophy. There were no ST-T changes and all intervals were normal.      INFECTIOUS DISEASES TESTING      RADIOLOGY & ADDITIONAL TESTS:  I have personally reviewed the last Chest xray  CXR      CT      CARDIOLOGY TESTING  12 Lead ECG:   Ventricular Rate 59 BPM    Atrial Rate 59 BPM    P-R Interval 136 ms    QRS Duration 78 ms    Q-T Interval 500 ms    QTC Calculation(Bazett) 495 ms    P Axis 11 degrees    R Axis -51 degrees    T Axis 220 degrees    Diagnosis Line Sinus bradycardia  Left anterior fascicular block  Anteroseptal infarct , age undetermined  T wave abnormality, consider inferolateral ischemia  Abnormal ECG    Confirmed by Sanket España ([id number]) on 9/4/2024 8:07:07 AM (09-04-24 @ 07:30)  12 Lead ECG:   Ventricular Rate 78 BPM    Atrial Rate 78 BPM    P-R Interval 146 ms    QRS Duration 76 ms    Q-T Interval 390 ms    QTC Calculation(Bazett) 444 ms    P Axis 36 degrees    R Axis -63 degrees    T Axis 47 degrees    Diagnosis Line Normal sinus rhythm  Possible Left atrial enlargement  Left anterior fascicular block  Septal infarct , age undetermined  Possible Lateral infarct , age undetermined  Abnormal ECG    Confirmed by Sanket España ([id number]) on 9/3/2024 3:26:34 PM (09-03-24 @ 14:47)      MEDICATIONS  (STANDING):  atorvastatin 20 milliGRAM(s) Oral at bedtime  carvedilol 3.125 milliGRAM(s) Oral every 12 hours  chlorhexidine 2% Cloths 1 Application(s) Topical <User Schedule>  dextrose 5%. 1000 milliLiter(s) (50 mL/Hr) IV Continuous <Continuous>  dextrose 5%. 1000 milliLiter(s) (100 mL/Hr) IV Continuous <Continuous>  dextrose 50% Injectable 25 Gram(s) IV Push once  dextrose 50% Injectable 12.5 Gram(s) IV Push once  dextrose 50% Injectable 25 Gram(s) IV Push once  enoxaparin Injectable 40 milliGRAM(s) SubCutaneous every 24 hours  furosemide   Injectable 40 milliGRAM(s) IV Push daily  glucagon  Injectable 1 milliGRAM(s) IntraMuscular once  insulin glargine Injectable (LANTUS) 10 Unit(s) SubCutaneous at bedtime  insulin lispro (ADMELOG) corrective regimen sliding scale   SubCutaneous three times a day before meals  losartan 100 milliGRAM(s) Oral daily  regadenoson Injectable 0.4 milliGRAM(s) IV Push once    MEDICATIONS  (PRN):  acetaminophen     Tablet .. 650 milliGRAM(s) Oral every 6 hours PRN Temp greater or equal to 38C (100.4F), Mild Pain (1 - 3)  aluminum hydroxide/magnesium hydroxide/simethicone Suspension 30 milliLiter(s) Oral every 4 hours PRN Dyspepsia  dextrose Oral Gel 15 Gram(s) Oral once PRN Blood Glucose LESS THAN 70 milliGRAM(s)/deciliter  melatonin 3 milliGRAM(s) Oral at bedtime PRN Insomnia  ondansetron Injectable 4 milliGRAM(s) IV Push every 8 hours PRN Nausea and/or Vomiting      ANTIBIOTICS:      All available historical data has been reviewed    ASSESSMENT  88y F admitted with Heart failure        PROBLEMS  
    CATHY SEPULVEDA  88y, Female  Allergy: Alcohol (Hives; Angioedema)  No Known Drug Allergies      CHIEF COMPLAINT: SOB x 1 day (03 Sep 2024 16:53)      HPI:  88F w/PMHx of HTN, DM2, HLD presents to ED with SOB.  Dtr translates reports patient with worsening anxiety x 1-2 days, this AM woke up SOB at rest so activated EMS for transport to ED.  Dtr reports patient developed CP/neck pain described as constant and pressure type on EMS arrival and resolved approx 30m later with o2 administration.  Reports worsening edema over the past 2-3 months, treated with PO Furosemide for a week but stopped.  No abdominal or urinary complaints.  No PND/Orthopnea.  No fever/chills.   (03 Sep 2024 15:37)    HPI:    FAMILY HISTORY:    PAST MEDICAL & SURGICAL HISTORY:  HTN (hypertension)      Diabetes      Vertigo      Status post hip replacement          SOCIAL HISTORY  Social History:  Substance Use (street drugs): ( x ) never used  (  ) other:  Tobacco Usage:  ( x  ) never smoked  Alcohol Usage: None (04 Jun 2024 21:54)      Home Medications:  acarbose 50 mg oral tablet: 1 tab(s) orally 3 times a day (03 Sep 2024 15:42)  carvedilol 3.125 mg oral tablet: 1 tab(s) orally 2 times a day (03 Sep 2024 15:42)  Janumet 50 mg-500 mg oral tablet: 1 tab(s) orally once a day (03 Sep 2024 15:43)  Jardiance 25 mg oral tablet: 1 tab(s) orally once a day (03 Sep 2024 15:43)  Lantus 100 units/mL subcutaneous solution: 10 unit(s) subcutaneous once a day (at bedtime) (03 Sep 2024 15:43)  losartan 100 mg oral tablet: 1 tab(s) orally once a day (03 Sep 2024 15:43)      ROS  General: Denies fevers, chills, nightsweats, weight loss  HEENT: Denies headache, rhinorrhea, sore throat, eye pain  CV: Denies CP, palpitations  PULM: Denies SOB, cough  GI: Denies abdominal pain, diarrhea  : Denies dysuria, hematuria  MSK: Denies arthralgias  SKIN: Denies rash   NEURO: Denies paresthesias, weakness      VITALS:  T(C): 36.6 (08 Sep 2024 05:00), Max: 36.7 (07 Sep 2024 21:00)  T(F): 97.8 (08 Sep 2024 05:00), Max: 98.1 (07 Sep 2024 21:00)  HR: 63 (08 Sep 2024 05:00) (63 - 76)  BP: 144/63 (08 Sep 2024 05:00) (134/66 - 144/63)  RR: 16 (08 Sep 2024 05:00) (16 - 16)  SpO2: 97% (08 Sep 2024 05:00) (95% - 97%)    O2 Parameters below as of 08 Sep 2024 05:00  Patient On (Oxygen Delivery Method): room air                              PHYSICAL EXAM:  Gen: NAD, resting in bed  HEENT: Normocephalic, atraumatic  Neck: supple, no lymphadenopathy  CV: Regular rate & regular rhythm  Lungs: CTABL no wheeze  Abdomen: Soft, NTND+ BS present  Ext: Warm, well perfused + le edema pitting improved   Neuro: non focal, awake, CN II-XII intact       TESTS & MEASUREMENTS:    CARDIOLOGY TESTING  12 Lead ECG:   Ventricular Rate 59 BPM          MEDICATIONS  (STANDING):  atorvastatin 20 milliGRAM(s) Oral at bedtime  carvedilol 3.125 milliGRAM(s) Oral every 12 hours  chlorhexidine 2% Cloths 1 Application(s) Topical <User Schedule>  dextrose 5%. 1000 milliLiter(s) (50 mL/Hr) IV Continuous <Continuous>  dextrose 5%. 1000 milliLiter(s) (100 mL/Hr) IV Continuous <Continuous>  dextrose 50% Injectable 25 Gram(s) IV Push once  dextrose 50% Injectable 12.5 Gram(s) IV Push once  dextrose 50% Injectable 25 Gram(s) IV Push once  enoxaparin Injectable 40 milliGRAM(s) SubCutaneous every 24 hours  furosemide   Injectable 40 milliGRAM(s) IV Push daily  glucagon  Injectable 1 milliGRAM(s) IntraMuscular once  insulin glargine Injectable (LANTUS) 10 Unit(s) SubCutaneous at bedtime  insulin lispro (ADMELOG) corrective regimen sliding scale   SubCutaneous three times a day before meals  losartan 100 milliGRAM(s) Oral daily  regadenoson Injectable 0.4 milliGRAM(s) IV Push once    MEDICATIONS  (PRN):  acetaminophen     Tablet .. 650 milliGRAM(s) Oral every 6 hours PRN Temp greater or equal to 38C (100.4F), Mild Pain (1 - 3)  aluminum hydroxide/magnesium hydroxide/simethicone Suspension 30 milliLiter(s) Oral every 4 hours PRN Dyspepsia  dextrose Oral Gel 15 Gram(s) Oral once PRN Blood Glucose LESS THAN 70 milliGRAM(s)/deciliter  melatonin 3 milliGRAM(s) Oral at bedtime PRN Insomnia  ondansetron Injectable 4 milliGRAM(s) IV Push every 8 hours PRN Nausea and/or Vomiting      ANTIBIOTICS:      All available historical data has been reviewed    ASSESSMENT  88y F admitted with Heart failure        PROBLEMS  
INTERVAL EVENTS: Patient with daughter at bedside.   -No complaints.   Discussed [facility] procedure and possible TAVR in future.     PAST MEDICAL & SURGICAL HISTORY:  HTN (hypertension)    Diabetes    Vertigo    No significant past surgical history    Status post hip replacement        MEDICATIONS  (STANDING):  aspirin enteric coated 81 milliGRAM(s) Oral daily  atorvastatin 20 milliGRAM(s) Oral at bedtime  carvedilol 3.125 milliGRAM(s) Oral every 12 hours  chlorhexidine 2% Cloths 1 Application(s) Topical <User Schedule>  dextrose 5%. 1000 milliLiter(s) (50 mL/Hr) IV Continuous <Continuous>  dextrose 5%. 1000 milliLiter(s) (100 mL/Hr) IV Continuous <Continuous>  dextrose 50% Injectable 25 Gram(s) IV Push once  dextrose 50% Injectable 12.5 Gram(s) IV Push once  dextrose 50% Injectable 25 Gram(s) IV Push once  furosemide    Tablet 40 milliGRAM(s) Oral daily  glucagon  Injectable 1 milliGRAM(s) IntraMuscular once  insulin glargine Injectable (LANTUS) 15 Unit(s) SubCutaneous at bedtime  insulin lispro (ADMELOG) corrective regimen sliding scale   SubCutaneous three times a day before meals  insulin lispro Injectable (ADMELOG) 5 Unit(s) SubCutaneous three times a day before meals  losartan 100 milliGRAM(s) Oral daily  polyethylene glycol 3350 17 Gram(s) Oral daily  regadenoson Injectable 0.4 milliGRAM(s) IV Push once  senna 2 Tablet(s) Oral at bedtime    MEDICATIONS  (PRN):  acetaminophen     Tablet .. 650 milliGRAM(s) Oral every 6 hours PRN Temp greater or equal to 38C (100.4F), Mild Pain (1 - 3)  aluminum hydroxide/magnesium hydroxide/simethicone Suspension 30 milliLiter(s) Oral every 4 hours PRN Dyspepsia  dextrose Oral Gel 15 Gram(s) Oral once PRN Blood Glucose LESS THAN 70 milliGRAM(s)/deciliter  melatonin 3 milliGRAM(s) Oral at bedtime PRN Insomnia  ondansetron Injectable 4 milliGRAM(s) IV Push every 8 hours PRN Nausea and/or Vomiting      Vital Signs Last 24 Hrs  T(C): 36.8 (09 Sep 2024 05:06), Max: 37.7 (08 Sep 2024 17:07)  T(F): 98.2 (09 Sep 2024 05:06), Max: 99.9 (08 Sep 2024 17:07)  HR: 72 (09 Sep 2024 05:06) (69 - 75)  BP: 151/70 (09 Sep 2024 05:06) (120/57 - 151/70)  BP(mean): --  RR: 16 (09 Sep 2024 05:06) (16 - 16)  SpO2: 94% (09 Sep 2024 05:06) (94% - 97%)    Parameters below as of 09 Sep 2024 05:06  Patient On (Oxygen Delivery Method): room air         PHYSICAL EXAM:  GEN: Awake, alert. NAD.   HEENT: NCAT, PERRL, EOMI. Mucosa moist. No JVD.  RESP: CTA b/l  CV: RRR. Normal S1/S2. 3/6 crescendo/decrescendo murmur.   ABD: Soft. NT/ND. BS+  EXT: Warm. No edema, clubbing, or cyanosis.   NEURO: AAOx3. No focal deficits.     LABS:                        12.3   36.10 )-----------( 261      ( 09 Sep 2024 07:05 )             38.1     09-09    132<L>  |  96<L>  |  27<H>  ----------------------------<  62<L>  4.3   |  29  |  <0.5<L>    Ca    8.8      09 Sep 2024 07:05  Mg     1.9     09-09    TPro  6.0  /  Alb  3.1<L>  /  TBili  0.2  /  DBili  x   /  AST  35  /  ALT  44<H>  /  AlkPhos  115  09-09        PT/INR - ( 09 Sep 2024 07:05 )   PT: 9.70 sec;   INR: 0.85 ratio         PTT - ( 09 Sep 2024 07:05 )  PTT:30.6 sec  Urinalysis Basic - ( 09 Sep 2024 07:05 )    Color: x / Appearance: x / SG: x / pH: x  Gluc: 62 mg/dL / Ketone: x  / Bili: x / Urobili: x   Blood: x / Protein: x / Nitrite: x   Leuk Esterase: x / RBC: x / WBC x   Sq Epi: x / Non Sq Epi: x / Bacteria: x      I&O's Summary    08 Sep 2024 07:01  -  09 Sep 2024 07:00  --------------------------------------------------------  IN: 476 mL / OUT: 1200 mL / NET: -724 mL      BNP  RADIOLOGY & ADDITIONAL STUDIES:    TELEMETRY: NSR    EKG: < from: 12 Lead ECG (09.04.24 @ 07:30) >  Sinus bradycardia  Left anterior fascicular block  Anteroseptal infarct , age undetermined  T wave abnormality, consider inferolateral ischemia  Abnormal ECG    < end of copied text >    Echo:< from: TTE Echo Complete w/o Contrast w/ Doppler (09.04.24 @ 07:37) >  Summary:   1. Left ventricular ejection fraction, by visual estimation, is 50 to   55%.   2. Mildly decreased segmental left ventricular systolic function.   3. Mid and apical anterior septum, apical inferior segment, and apex are   abnormal as described above.   4. Mild left ventricular hypertrophy.   5. Normal right ventricular size and function.   6. Mild to moderately enlarged left atrium.   7. Mild mitral annular calcification.   8. Mild to moderate mitral valve regurgitation.   9. Moderate tricuspid regurgitation.  10. Mild aortic regurgitation.  11. Moderate to severe aortic valve stenosis.  12. Mild pulmonic valve regurgitation.  13. There is mild aortic root calcification.      < end of copied text >    Stress: < from: NM Nuclear Stress Pharmacologic Multiple (09.04.24 @ 14:47) >  Impression:  1.   Small to moderate size severe reversible defect in the inferolateral   wall and small reversible defect in the anteroseptal wall of the left   ventricle consistent with ischemia and suggesting ischemia in multiple   coronary territories.  2.  Dilated left ventricle with mild global hypokinesis. All walls of the   left ventricle thicken.  3.  Left ventricular ejection fraction calculated as 46% which is low.   Wall motion analysis suggests ejection fraction of 40-45%.   Echocardiographic ejection fraction by visual estimation 50-55% on   09/04/2024    < end of copied text >          
no discrete location documentation necessary

## 2024-09-09 NOTE — PROGRESS NOTE ADULT - ASSESSMENT
#suspected acute on chronic diastolic CHF- was only on lasix for one week then stopped, per daughter gets her to drink excessive fluids  - po lasix 40mg po   - i<o  -fluid restriction   < from: TTE Echo Complete w/o Contrast w/ Doppler (09.04.24 @ 07:37) >  Summary:   1. Left ventricular ejection fraction, by visual estimation, is 50 to   55%.   2. Mildly decreased segmental left ventricular systolic function.   3. Mid and apical anterior septum, apical inferior segment, and apex are   abnormal as described above.   4. Mild left ventricular hypertrophy.   5. Normal right ventricular size and function.   6. Mild to moderately enlarged left atrium.   7. Mild mitral annular calcification.   8. Mild to moderate mitral valve regurgitation.   9. Moderate tricuspid regurgitation.  10. Mild aortic regurgitation.  11. Moderate to severe aortic valve stenosis.  12. Mild pulmonic valve regurgitation.  13. There is mild aortic root calcification.    < end of copied text >    -cardio eval appreciated - LEXISCAN :  Impression:  1.   Small to moderate size severe reversible defect in the inferolateral   wall and small reversible defect in the anteroseptal wall of the left   ventricle consistent with ischemia and suggesting ischemia in multiple   coronary territories.  2.  Dilated left ventricle with mild global hypokinesis. All walls of the   left ventricle thicken.  3.  Left ventricular ejection fraction calculated as 46% which is low.   Wall motion analysis suggests ejection fraction of 40-45%.   Echocardiographic ejection fraction by visual estimation 50-55% on   09/04/2024          #troponinemia - NSTEMI II - likely type II demand ischemia - + delta in trops but then flat - cont tele monitoring ,   on asa/statin/beta    discussed with cardio dr ritter as well as daughter over phone 9/6  dicsussed that needs eval for CATH/TAVR  patient states she doesnt want "surgery" - but now is agreeable for cath - cardiac cath today 9/9 at [facility]  #acute hypoxic resp failure -resolved - off NIV - now off o2 on RA - cont to ambulate   #DMII - insulin basal bolus - adjust     #hyponatremia - fluid resitrication - cont to monitor bmp- improving - avoid rapid over correction   #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

## 2024-09-09 NOTE — CHART NOTE - NSCHARTNOTEFT_GEN_A_CORE
PROCEDURE:   - Left heart cath  - Right heart cath    PHYSICIAN: Dr. Chaudhry  FELLOW: Dr. Amado, Dr. Lee, Dr. Fabricio Ron    Pre-procedure Diagnosis: Moderate to Severe Aortic Stenosis    Consent: Patient  Used   Anesthesia: Sedation | Local     ACCESS & CLOSURE:  6 Fr R L Femoral Artery   6 Fr R L Femoral Vein     IV Contrast: 30 mL      Intervention: None  Implants: None    AUC: 7     FINDINGS:     Coronary Dominance: Right    LM: short segment engaged    LAD: Medium Sized Vessel  pLAD: 80% lesion at distal third   mLAD: moderate atherosclerotic disease  dLAD: mild atherosclerotic disease  D1: severe ostial stenosis, small vessel  D2: minor luminal irregularities    CX: medium sized vessel  pLcx: minor luminal irregularities  dLcx: tortuous vessel with minor luminal irregularities  OM1: Large tortuous vessel, no significant disease    RCA: Medium sized, dominant vessel  pRCA: mild atherosclerotic disease  mRCA: mild atherosclerotic disease  dRCA: minor luminal irregularities  RPDA: no significant disease  RPL: no significant disease        LVEDP: 25 mmHg     EF: 50 % by Echo    PA % sat: 75.2%  SaO2 % sat: 98.5%    RA pressure: 9 mmHg  RV pressure: 40/13 mmHg  PA pressure: 44/17/25 mmHg  PCWP: 23/17/14 mmHg    CVP: 9 mmHg  CO/CI Jc: 3.24 L/min - 2.16 L/min/m2  PVR (woods units): 2.47 Woods units  SVR (Dynes.s/cm5):   1728 dynes.s/cm5    AV gradient: 40 mmHg (peak to peak gradient)      ESTIMATED BLOOD LOSS: < 10 mL      CONDITION: Good   SPECIMEN REMOVED: N/A     POST-OP DIAGNOSIS:    - Normal Coronary Angiogram   - Mild Coronary Artery Disease (< 50% stenosis)   - 1-  2-  3- Vessel Coronary Artery Disease      PLAN OF CARE:    [X] Return to In-patient bed   [X] Medications:   - Continue with inpatient medications  - Consult structural cardiology for TAVR evaluation  [X] LVEDP guided IV Fluids: NS @ 100cc/h x 4 hours  [X] Remove arterial and venous femoral sheath. Hold manual pressure if signs of hematoma or bleeding over femoral access sites. PROCEDURE:   - Left heart cath  - Right heart cath    PHYSICIAN: Dr. Chaudhry  FELLOW: Dr. Amado, Dr. Lee, Dr. Fabricio Ron    Pre-procedure Diagnosis: Moderate to Severe Aortic Stenosis    Consent: Patient  Used   Anesthesia: Sedation | Local     ACCESS & CLOSURE:  6 Fr R L Femoral Artery   6 Fr R L Femoral Vein     IV Contrast: 30 mL      Intervention: None  Implants: None    AUC: 7     FINDINGS:     Coronary Dominance: Right    LM: short segment engaged    LAD: Medium Sized Vessel  pLAD: 80% lesion at distal third   mLAD: moderate atherosclerotic disease  dLAD: mild atherosclerotic disease  D1: severe ostial stenosis, small vessel  D2: minor luminal irregularities    CX: medium sized vessel  pLcx: minor luminal irregularities  dLcx: tortuous vessel with minor luminal irregularities  OM1: Large tortuous vessel, no significant disease    RCA: Medium sized, dominant vessel  pRCA: mild atherosclerotic disease  mRCA: mild atherosclerotic disease  dRCA: minor luminal irregularities  RPDA: no significant disease  RPL: no significant disease        LVEDP: 25 mmHg     EF: 50 % by Echo    PA % sat: 75.2%  SaO2 % sat: 98.5%    RA pressure: 9 mmHg  RV pressure: 40/13 mmHg  PA pressure: 44/17/25 mmHg  PCWP: 23/17/14 mmHg    CVP: 9 mmHg  CO/CI Jc: 3.24 L/min - 2.16 L/min/m2  PVR (woods units): 2.47 Woods units  SVR (Dynes.s/cm5):   1728 dynes.s/cm5    AV gradient: 40 mmHg (peak to peak gradient)  ALEX: 0.51 cm2 (by Hakki equation)      ESTIMATED BLOOD LOSS: < 10 mL      CONDITION: Good   SPECIMEN REMOVED: N/A     POST-OP DIAGNOSIS:    - Normal Coronary Angiogram   - Mild Coronary Artery Disease (< 50% stenosis)   - 1-  2-  3- Vessel Coronary Artery Disease      PLAN OF CARE:    [X] Return to In-patient bed   [X] Medications:   - Continue with inpatient medications  - Consult structural cardiology for TAVR evaluation  [X] LVEDP guided IV Fluids: NS @ 100cc/h x 4 hours  [X] Remove arterial and venous femoral sheath. Hold manual pressure if signs of hematoma or bleeding over femoral access sites. PROCEDURE:   - Left heart cath  - Right heart cath    PHYSICIAN: Dr. Chaudhry  FELLOW: Dr. Amado, Dr. Lee, Dr. Fabricio Ron    Pre-procedure Diagnosis: CHF, Moderate to Severe Aortic Stenosis    Consent: Patient  Used   Anesthesia: Sedation | Local     ACCESS & CLOSURE:  6 Fr R L Femoral Artery -> manual compression  6 Fr R L Femoral Vein  -> manual compression    IV Contrast: 30 mL      Intervention: None  Implants: None     FINDINGS:     Coronary Dominance: Right    LM: short segment engaged    LAD: Medium Sized Vessel  pLAD: 85% lesion at distal third   mLAD: moderate atherosclerotic disease  dLAD: mild atherosclerotic disease  D1: severe ostial stenosis, small vessel  D2: minor luminal irregularities    CX: medium sized vessel  pLcx: minor luminal irregularities  dLcx: tortuous vessel with minor luminal irregularities  OM1: Large tortuous vessel, no significant disease    RCA: Medium sized, dominant vessel  pRCA: mild atherosclerotic disease  mRCA: mild atherosclerotic disease  dRCA: minor luminal irregularities  RPDA: no significant disease  RPL: no significant disease        LVEDP: 25 mmHg     EF: 50 % by Echo    PA % sat: 75.2%  SaO2 % sat: 98.5%    RA pressure: 9 mmHg  RV pressure: 40/13 mmHg  PA pressure: 44/17/25 mmHg  PCWP: 23/17/14 mmHg    CVP: 9 mmHg  CO/CI Jc: 3.24 L/min - 2.16 L/min/m2  PVR (woods units): 2.47 Woods units  SVR (Dynes.s/cm5):   1728 dynes.s/cm5    AV gradient: 40 mmHg (peak to peak gradient)  ALEX: 0.51 cm2 (by Hakki equation)      ESTIMATED BLOOD LOSS: < 10 mL      CONDITION: Good   SPECIMEN REMOVED: N/A     POST-OP DIAGNOSIS:    Severe single vessel disease  Severe AS     PLAN OF CARE:    [X] Return to In-patient bed   [X] Medications:   - Continue with inpatient medications  - Consult structural cardiology for TAVR evaluation  [X] LVEDP guided IV Fluids: NS @ 100cc/h x 4 hours  [X] Remove arterial and venous femoral sheath. Hold manual pressure if signs of hematoma or bleeding over femoral access sites. PROCEDURE:   - Left heart cath  - Right heart cath    PHYSICIAN: Dr. Chaudhry  FELLOW: Dr. Amado, Dr. Lee, Dr. Fabricio Ron    Pre-procedure Diagnosis: CHF, Moderate to Severe Aortic Stenosis    Consent: Patient  Used   Anesthesia: Sedation | Local     ACCESS & CLOSURE:  6 Fr R L Femoral Artery -> manual compression  6 Fr R L Femoral Vein  -> manual compression    IV Contrast: 30 mL      Intervention: None  Implants: None     FINDINGS:     Coronary Dominance: Right    LM: short segment engaged    LAD: Medium Sized Vessel  pLAD: 85% lesion at distal third   mLAD: moderate atherosclerotic disease  dLAD: mild atherosclerotic disease  D1: severe ostial stenosis, small vessel  D2: minor luminal irregularities    CX: medium sized vessel  pLcx: minor luminal irregularities  dLcx: tortuous vessel with minor luminal irregularities  OM1: Large tortuous vessel, no significant disease    RCA: Medium sized, dominant vessel  pRCA: mild atherosclerotic disease  mRCA: mild atherosclerotic disease  dRCA: minor luminal irregularities  RPDA: no significant disease  RPL: no significant disease        LVEDP: 15 mmHg     EF: 50 % by Echo    PA % sat: 75.2%  SaO2 % sat: 98.5%    RA pressure: 9 mmHg  RV pressure: 40/13 mmHg  PA pressure: 44/17/25 mmHg  PCWP: 23/17/14 mmHg    CVP: 9 mmHg  CO/CI Jc: 3.24 L/min - 2.16 L/min/m2  PVR (woods units): 2.47 Woods units  SVR (Dynes.s/cm5):   1728 dynes.s/cm5    AV gradient: 40 mmHg (peak to peak gradient)  ALEX: 0.51 cm2 (by Hakki equation)      ESTIMATED BLOOD LOSS: < 10 mL      CONDITION: Good   SPECIMEN REMOVED: N/A     POST-OP DIAGNOSIS:    Severe single vessel disease  Severe AS     PLAN OF CARE:    [X] Return to In-patient bed   [X] Medications:   - Continue with inpatient medications  - Consult structural cardiology for TAVR evaluation  [X] LVEDP guided IV Fluids: NS @ 100cc/h x 4 hours  [X] Remove arterial and venous femoral sheath. Hold manual pressure if signs of hematoma or bleeding over femoral access sites.

## 2024-09-09 NOTE — PROGRESS NOTE ADULT - ASSESSMENT
88F w/PMHx of HTN, DM2, HLD presents to ED with SOB and increased leg swelling     Plan  # Acute on chronic CHF  # Aortic Stenosis  # Leukocytosis /  ? CLL /  # Hyponatremia  - Pt presented with SOB and B/L LE worsening over the last few months  - mild troponin elevation likely demand ischemia  - NM stress 09/04 +  reversible defect consistent with ischemia  - ECHO with LVEF 50-55%, +RWMA,, mod to sev AS  - discussed possibility of LHC/TAVR with patient's DTR Amie. They are agreeable to C but do not want TAVR at this time  - NPO for C today.   - Daughter may be interested in pursuing TAVR in future.   - c/w Asa / Atorvastatin / Coreg /  Losartan / Lasix  - Fluid restriction  - Monitor Lytes

## 2024-09-09 NOTE — PROGRESS NOTE ADULT - PROVIDER SPECIALTY LIST ADULT
Cardiology
Hospitalist
Internal Medicine
Hospitalist
Hospitalist
Cardiology
Internal Medicine
Internal Medicine

## 2024-09-09 NOTE — CHART NOTE - NSCHARTNOTEFT_GEN_A_CORE
88F w/PMHx of HTN, DM2, HLD presents to ED with SOB At [facility].   As per daughter   reports patient with worsening anxiety x 1-2 days, prior to hospital visit that woke up that am with SOB at rest so activated EMS for transport to ED.  Dtr reports patient developed CP/neck pain described as constant and pressure type on EMS arrival and resolved approx 30m later with o2 administration.  Also experienced leg edema for last 2-3 months.  Troponins 58/55/46 and was found to have abormal stress test and Echo revealing  Moderate to severe aortic valve stenosis but daughter only consents to cardiac cath but no TAVR at present.      NM Nuclear Stress Pharmacologic Multiple (09.04.24 @ 14:47) >  Impression:  1.   Small to moderate size severe reversible defect in the inferolateral   wall and small reversible defect in the anteroseptal wall of the left   ventricle consistent with ischemia and suggesting ischemia in multiple   coronary territories.  2.  Dilated left ventricle with mild global hypokinesis. All walls of the   left ventricle thicken.  3.  Left ventricular ejection fraction calculated as 46% which is low.   Wall motion analysis suggests ejection fraction of 40-45%.   Echocardiographic ejection fraction by visual estimation 50-55% on   09/04/2024    TRANSTHORACIC ECHOCARDIOGRAM REPORT  Patient Name:   CATHY SEPULVEDA Accession #: [id number]  Medical Rec #:  [id number]   Height:      62.0 in 157.5 cm  YOB: 1935   Weight:      112.0 lb 50.80 kg  Patient Age:    88 years    BSA:         1.49 m²  Patient Gender: F           BP:          129/71 mmHg       Date of Exam:        9/4/2024 7:37:58 AM  Referring Physician: Kitty Rodriguez  Sonographer:         Samia Wilkerson  Reading Physician:   Alvin Pena M.D.    Procedure:   2D Echo/Doppler/Color Doppler Complete.  Indications: R60.9 - Edema, unspecified  Diagnosis:   R60.9 - Edema, unspecified     Summary:   1. Left ventricular ejection fraction, by visual estimation, is 50 to 55%.   2. Mildly decreased segmental left ventricular systolic function.   3. Mid and apical anterior septum, apical inferior segment, and apex are abnormal as described above.   4. Mild left ventricular hypertrophy.   5. Normal right ventricular size and function.   6. Mild to moderately enlarged left atrium.   7. Mild mitral annular calcification.   8. Mild to moderate mitral valve regurgitation.   9. Moderate tricuspid regurgitation.  10. Mild aortic regurgitation.  11. Moderate to severe aortic valve stenosis.  12. Mild pulmonic valve regurgitation.  13. There is mild aortic root calcification.    PHYSICIAN INTERPRETATION:  Left Ventricle: The left ventricular internal cavity size is normal. There is mild left ventricular hypertrophy. Left ventricular ejection fraction, by visual estimation, is 50 to 55%. Mildly decreased segmental left ventricular systolic function.                                              PREOPERATIVE DAY OF PROCEDURE EVALUATION:  I have personally seen and examined the patient.  I agree with the history and physical which I have reviewed and noted any changes below.  (Signed electronically by __________)  09-09-24 @ 11:29      CathI Bleeding Risk score is: 3.3%    RIGHT RADIAL ARTERY EVALUATION:  CONCEPCION TEST: [ ] Negative          [x ] Positive    Anti- Anginal medications:                        [ ] not used                       [ x] used:  [ ]CCB  [] BB  [ ] Nitrate [ ] Ranexa                [ ] not used but strong indication not to use. 88F w/PMHx of HTN, DM2, HLD presents to ED with SOB At [facility].   As per daughter  reports patient with worsening anxiety x 1-2 days, prior to hospital visit that woke up that am with SOB at rest so activated EMS for transport to ED.  Dtr reports patient developed CP/neck pain described as constant and pressure type on EMS arrival and resolved approx 30m later with o2 administration.  Also experienced leg edema for last 2-3 months.  Troponins 58/55/46 and was found to have abormal stress test and Echo revealing  Moderate to severe aortic valve stenosis but daughter only consents to cardiac cath but no TAVR at present.      NM Nuclear Stress Pharmacologic Multiple (09.04.24 @ 14:47) >  Impression:  1.   Small to moderate size severe reversible defect in the inferolateral   wall and small reversible defect in the anteroseptal wall of the left   ventricle consistent with ischemia and suggesting ischemia in multiple   coronary territories.  2.  Dilated left ventricle with mild global hypokinesis. All walls of the   left ventricle thicken.  3.  Left ventricular ejection fraction calculated as 46% which is low.   Wall motion analysis suggests ejection fraction of 40-45%.   Echocardiographic ejection fraction by visual estimation 50-55% on   09/04/2024    TRANSTHORACIC ECHOCARDIOGRAM REPORT  Patient Name:   CATHY SEPULVEDA Accession #: [id number]  Medical Rec #:  [id number]   Height:      62.0 in 157.5 cm  YOB: 1935   Weight:      112.0 lb 50.80 kg  Patient Age:    88 years    BSA:         1.49 m²  Patient Gender: F           BP:          129/71 mmHg       Date of Exam:        9/4/2024 7:37:58 AM  Referring Physician: Kitty Rodriguez  Sonographer:         Samia Wilkerson  Reading Physician:   Alvin Pean M.D.    Procedure:   2D Echo/Doppler/Color Doppler Complete.  Indications: R60.9 - Edema, unspecified  Diagnosis:   R60.9 - Edema, unspecified     Summary:   1. Left ventricular ejection fraction, by visual estimation, is 50 to 55%.   2. Mildly decreased segmental left ventricular systolic function.   3. Mid and apical anterior septum, apical inferior segment, and apex are abnormal as described above.   4. Mild left ventricular hypertrophy.   5. Normal right ventricular size and function.   6. Mild to moderately enlarged left atrium.   7. Mild mitral annular calcification.   8. Mild to moderate mitral valve regurgitation.   9. Moderate tricuspid regurgitation.  10. Mild aortic regurgitation.  11. Moderate to severe aortic valve stenosis.  12. Mild pulmonic valve regurgitation.  13. There is mild aortic root calcification.    PHYSICIAN INTERPRETATION:  Left Ventricle: The left ventricular internal cavity size is normal. There is mild left ventricular hypertrophy. Left ventricular ejection fraction, by visual estimation, is 50 to 55%. Mildly decreased segmental left ventricular systolic function.                                              PREOPERATIVE DAY OF PROCEDURE EVALUATION:  I have personally seen and examined the patient.  I agree with the history and physical which I have reviewed and noted any changes below.  (Signed electronically by __________)  09-09-24 @ 11:29      CathI Bleeding Risk score is: 3.3%    RIGHT RADIAL ARTERY EVALUATION:  CONCEPCION TEST: [ ] Negative          [x ] Positive    Anti- Anginal medications:                        [ ] not used                       [ x] used:  [ ]CCB  [] BB  [ ] Nitrate [ ] Ranexa                [ ] not used but strong indication not to use. 88F w/PMHx of HTN, DM2, HLD presents to ED with SOB At [facility].   As per daughter  reports patient with worsening anxiety x 1-2 days, prior to hospital visit that woke up that am with SOB at rest so activated EMS for transport to ED.  Dtr reports patient developed CP/neck pain described as constant and pressure type on EMS arrival and resolved approx 30m later with o2 administration.  Also experienced leg edema for last 2-3 months.  Troponins 58/55/46 and was found to have abnormal stress test and Echo revealing  Moderate to severe aortic valve stenosis but daughter only consents to cardiac cath but no TAVR at present.      NM Nuclear Stress Pharmacologic Multiple (09.04.24 @ 14:47) >  Impression:  1.   Small to moderate size severe reversible defect in the inferolateral   wall and small reversible defect in the anteroseptal wall of the left   ventricle consistent with ischemia and suggesting ischemia in multiple   coronary territories.  2.  Dilated left ventricle with mild global hypokinesis. All walls of the   left ventricle thicken.  3.  Left ventricular ejection fraction calculated as 46% which is low.   Wall motion analysis suggests ejection fraction of 40-45%.   Echocardiographic ejection fraction by visual estimation 50-55% on   09/04/2024    TRANSTHORACIC ECHOCARDIOGRAM REPORT  Patient Name:   CATHY SEPULVEDA Accession #: [id number]  Medical Rec #:  [id number]   Height:      62.0 in 157.5 cm  YOB: 1935   Weight:      112.0 lb 50.80 kg  Patient Age:    88 years    BSA:         1.49 m²  Patient Gender: F           BP:          129/71 mmHg       Date of Exam:        9/4/2024 7:37:58 AM  Referring Physician: Kitty Rodriguez  Sonographer:         Samia Wilkerson  Reading Physician:   Alvin Pena M.D.    Procedure:   2D Echo/Doppler/Color Doppler Complete.  Indications: R60.9 - Edema, unspecified  Diagnosis:   R60.9 - Edema, unspecified     Summary:   1. Left ventricular ejection fraction, by visual estimation, is 50 to 55%.   2. Mildly decreased segmental left ventricular systolic function.   3. Mid and apical anterior septum, apical inferior segment, and apex are abnormal as described above.   4. Mild left ventricular hypertrophy.   5. Normal right ventricular size and function.   6. Mild to moderately enlarged left atrium.   7. Mild mitral annular calcification.   8. Mild to moderate mitral valve regurgitation.   9. Moderate tricuspid regurgitation.  10. Mild aortic regurgitation.  11. Moderate to severe aortic valve stenosis.  12. Mild pulmonic valve regurgitation.  13. There is mild aortic root calcification.    PHYSICIAN INTERPRETATION:  Left Ventricle: The left ventricular internal cavity size is normal. There is mild left ventricular hypertrophy. Left ventricular ejection fraction, by visual estimation, is 50 to 55%. Mildly decreased segmental left ventricular systolic function.                                              PREOPERATIVE DAY OF PROCEDURE EVALUATION:  I have personally seen and examined the patient.  I agree with the history and physical which I have reviewed and noted any changes below.  (Signed electronically by __________)  09-09-24 @ 11:29      CathI Bleeding Risk score is: 3.3%    RIGHT RADIAL ARTERY EVALUATION:  CONCEPCION TEST: [ ] Negative          [x ] Positive    Anti- Anginal medications:                        [ ] not used                       [ x] used:  [ ]CCB  [] BB  [ ] Nitrate [ ] Ranexa                [ ] not used but strong indication not to use.

## 2024-09-10 ENCOUNTER — TRANSCRIPTION ENCOUNTER (OUTPATIENT)
Age: 89
End: 2024-09-10

## 2024-09-10 VITALS
DIASTOLIC BLOOD PRESSURE: 61 MMHG | SYSTOLIC BLOOD PRESSURE: 134 MMHG | HEART RATE: 67 BPM | RESPIRATION RATE: 18 BRPM | TEMPERATURE: 98 F

## 2024-09-10 LAB
GLUCOSE BLDC GLUCOMTR-MCNC: 108 MG/DL — HIGH (ref 70–99)
GLUCOSE BLDC GLUCOMTR-MCNC: 217 MG/DL — HIGH (ref 70–99)

## 2024-09-10 PROCEDURE — 99239 HOSP IP/OBS DSCHRG MGMT >30: CPT

## 2024-09-10 RX ORDER — ASPIRIN 81 MG
1 TABLET, DELAYED RELEASE (ENTERIC COATED) ORAL
Qty: 0 | Refills: 0 | DISCHARGE
Start: 2024-09-10

## 2024-09-10 RX ADMIN — Medication 4: at 08:10

## 2024-09-10 RX ADMIN — ACETAMINOPHEN 650 MILLIGRAM(S): 325 TABLET ORAL at 06:09

## 2024-09-10 RX ADMIN — SODIUM CHLORIDE 3 MILLILITER(S): 9 INJECTION INTRAMUSCULAR; INTRAVENOUS; SUBCUTANEOUS at 06:10

## 2024-09-10 RX ADMIN — CHLORHEXIDINE GLUCONATE 1 APPLICATION(S): 40 SOLUTION TOPICAL at 06:08

## 2024-09-10 RX ADMIN — Medication 40 MILLIGRAM(S): at 06:09

## 2024-09-10 RX ADMIN — LOSARTAN POTASSIUM 100 MILLIGRAM(S): 50 TABLET ORAL at 06:09

## 2024-09-10 RX ADMIN — Medication 81 MILLIGRAM(S): at 11:28

## 2024-09-10 RX ADMIN — Medication 5 UNIT(S): at 08:08

## 2024-09-10 RX ADMIN — CARVEDILOL 3.12 MILLIGRAM(S): 6.25 TABLET ORAL at 06:09

## 2024-09-10 NOTE — DISCHARGE NOTE NURSING/CASE MANAGEMENT/SOCIAL WORK - NSSCCONTNUM_GEN_ALL_CORE
[phone] Patient completed function tests wondering when doctor would like her to follow up with her again. Please advise.

## 2024-09-10 NOTE — DISCHARGE NOTE PROVIDER - PROVIDER TOKENS
PROVIDER:[TOKEN:[[id number]],FOLLOWUP:[1 week]],PROVIDER:[TOKEN:[[id number]],FOLLOWUP:[1 week]]

## 2024-09-10 NOTE — DISCHARGE NOTE PROVIDER - NSDCMRMEDTOKEN_GEN_ALL_CORE_FT
acarbose 50 mg oral tablet: 1 tab(s) orally 3 times a day  aspirin 81 mg oral delayed release tablet: 1 tab(s) orally once a day  atorvastatin 20 mg oral tablet: 1 tab(s) orally once a day   carvedilol 3.125 mg oral tablet: 1 tab(s) orally 2 times a day  Janumet 50 mg-500 mg oral tablet: 1 tab(s) orally once a day  Jardiance 25 mg oral tablet: 1 tab(s) orally once a day  Lantus 100 units/mL subcutaneous solution: 10 unit(s) subcutaneous once a day (at bedtime)  losartan 100 mg oral tablet: 1 tab(s) orally once a day

## 2024-09-10 NOTE — DISCHARGE NOTE NURSING/CASE MANAGEMENT/SOCIAL WORK - NSDCPEFALRISK_GEN_ALL_CORE
For information on Fall & Injury Prevention, visit: [email or web address] OR  [email or web address] OR  https://www.cdc.gov/steadi/patient.html

## 2024-09-10 NOTE — DISCHARGE NOTE PROVIDER - NSDCCPCAREPLAN_GEN_ALL_CORE_FT
PRINCIPAL DISCHARGE DIAGNOSIS  Diagnosis: CAD (coronary artery disease)  Assessment and Plan of Treatment: You were admitted because of shortness of breath and chest pain. You underwent left heart catheterization procedure which showed no critical obstructions of the arteries of the heart (coronary arteris). You also underwent ultrasound of the heart which showed severe stenosis of the aortic valve.  After discharge, please take medications as prescribed. Please follow up with primary doctor and cardiologist for possible further management of aortic stenosis.      SECONDARY DISCHARGE DIAGNOSES  Diagnosis: Hyponatremia  Assessment and Plan of Treatment:     Diagnosis: Elevated WBCs  Assessment and Plan of Treatment:

## 2024-09-10 NOTE — DISCHARGE NOTE PROVIDER - HOSPITAL COURSE
88F w/PMHx of HTN, DM2, HLD presents to ED with SOB At [facility].  As per daughter reports patient with worsening anxiety x 1-2 days, prior to hospital visit that woke up that am with SOB at rest so activated EMS for transport to ED.  Dtr reports patient developed CP/neck pain described as constant and pressure type on EMS arrival and resolved approx 30m later with o2 administration.  Also experienced leg edema for last 2-3 months.  Troponins 58/55/46 and was found to have abnormal stress test and Echo revealing Moderate to severe aortic valve stenosis but daughter only consents to cardiac cath but no TAVR at present      Stress test :   1.   Small to moderate size severe reversible defect in the inferolateral wall and small reversible defect in the anteroseptal wall of the left ventricle consistent with ischemia and suggesting ischemia in multiple coronary territories. 2.  Dilated left ventricle with mild global hypokinesis. All walls of the left ventricle thicken. 3.  Left ventricular ejection fraction calculated as 46% which is low. Wall motion analysis suggests ejection fraction of 40-45%.       LHC on 9/9 showed non-obstructive CAD:  LM: short segment engaged  LAD: Medium Sized Vessel  pLAD: 85% lesion at distal third   mLAD: moderate atherosclerotic disease  dLAD: mild atherosclerotic disease  D1: severe ostial stenosis, small vessel  D2: minor luminal irregularities    CX: medium sized vessel  pLcx: minor luminal irregularities  dLcx: tortuous vessel with minor luminal irregularities  OM1: Large tortuous vessel, no significant disease    RCA: Medium sized, dominant vessel  pRCA: mild atherosclerotic disease  mRCA: mild atherosclerotic disease  dRCA: minor luminal irregularities  RPDA: no significant disease  RPL: no significant disease    On exam today, pt has no acute complaints. Euvolemic. B/l clear lung sounds. Systolic murmur. Pt is stable for discharge with home physical therapy

## 2024-09-10 NOTE — PHYSICAL THERAPY INITIAL EVALUATION ADULT - WEIGHT-BEARING RESTRICTIONS: STAND/SIT, REHAB EVAL
Subjective     Aravind Wilburn is a 4 y.o. male who presents with Faint (Changing color /Last about five seconds /Seizure like activity )            Here with mom and dad who are the pleasant, helpful, and independent historians for this visit.  Aravind has had 3 episodes of loss of consciousness.  2 of the episodes were preceded by a painful stimuli such as tripping or falling.  He would cry both parents deny that he held his breath.  He would go into a posturing and shaking type episode.  He does turn pale white his lips had turned blue.  The episodes last approximately 5 to 6 seconds.  He then becomes disoriented and says that was confusing and then goes back to being playful.  The third episode was near a car in the driveway.  Dad reports hearing a thud and by the time he got around the car Aravind was on the ground in a posturing position with his eyes rolled back.  Parents are concerned about focal seizures.  They have also considered breath-holding spells but again deny the crying followed by the breath-holding.  He has not been fevered.  No known sick symptoms.  No other questions or concerns.        ROS  See above. All other systems reviewed and negative.    Patient Active Problem List   Diagnosis    Eczema    Failure to thrive (child)    Multicystic kidney disease    Hydronephrosis    Renal abnormality of fetus on prenatal ultrasound    Underweight in childhood    Thrombocytopenia (HCC)     History reviewed. No pertinent surgical history.    Family History   Problem Relation Age of Onset    Other Mother         Neutropenia    Other Maternal Grandmother         Neutropenia              Objective     Pulse 100   Temp 36.8 °C (98.3 °F) (Temporal)   Resp 32   Wt 11.7 kg (25 lb 12.7 oz)      Physical Exam  Vitals reviewed.   Constitutional:       General: He is active. He is not in acute distress.     Appearance: Normal appearance. He is well-developed. He is not toxic-appearing.   HENT:      Head:  Normocephalic and atraumatic.      Right Ear: Tympanic membrane, ear canal and external ear normal. There is no impacted cerumen. Tympanic membrane is not erythematous or bulging.      Left Ear: Tympanic membrane, ear canal and external ear normal. There is no impacted cerumen. Tympanic membrane is not erythematous or bulging.      Nose: Nose normal. No congestion or rhinorrhea.      Mouth/Throat:      Mouth: Mucous membranes are moist.      Pharynx: Oropharynx is clear. No oropharyngeal exudate or posterior oropharyngeal erythema.   Eyes:      General: Red reflex is present bilaterally.         Right eye: No discharge.         Left eye: No discharge.      Extraocular Movements: Extraocular movements intact.      Conjunctiva/sclera: Conjunctivae normal.      Pupils: Pupils are equal, round, and reactive to light.   Cardiovascular:      Rate and Rhythm: Normal rate and regular rhythm.      Pulses: Normal pulses.      Heart sounds: Normal heart sounds. No murmur heard.  Pulmonary:      Effort: Pulmonary effort is normal. No respiratory distress, nasal flaring or retractions.      Breath sounds: Normal breath sounds. No stridor or decreased air movement. No wheezing or rhonchi.   Abdominal:      General: Bowel sounds are normal. There is no distension.      Palpations: Abdomen is soft. There is no mass.      Tenderness: There is no abdominal tenderness. There is no guarding.      Hernia: No hernia is present.   Musculoskeletal:         General: No swelling, tenderness, deformity or signs of injury. Normal range of motion.      Cervical back: Normal range of motion and neck supple. No rigidity.   Lymphadenopathy:      Cervical: No cervical adenopathy.   Skin:     General: Skin is warm and dry.      Capillary Refill: Capillary refill takes less than 2 seconds.      Coloration: Skin is not cyanotic, jaundiced, mottled or pale.      Findings: No erythema, petechiae or rash.      Comments: Kerby   Neurological:      General:  No focal deficit present.      Mental Status: He is alert.                             Assessment & Plan      Send is a generally healthy well-appearing 4-year-old male.  He is currently afebrile and nontoxic-appearing.  He has moist mucous membranes.  His skin is pink, warm, and dry.  He is awake, alert, and appropriate for age with no obvious signs or symptoms of distress or discomfort at this time.    I have encouraged parents to start documenting and recording each episode.  They will be mindful of what preceded the event, how long the event lasted, and how long it takes him to return to baseline.    I do suspect that these episodes may be breath-holding spells.  However given the number and description I do feel assessment from a neurological and cardiac standpoint is warranted.  Parents agree and would like to be seen by specialist.    Strict return precautions have been reviewed to include increased work of breathing, shortness of breath, persistent fever, persistent vomiting, lethargy, dehydration, recurrent episodes, change in mentation, or any other concerns.  Assessment & Plan  Episode of shaking    Orders:    Referral to Pediatric Neurology    Syncope, unspecified syncope type    Orders:    Referral to Pediatric Cardiology    Referral to Pediatric Neurology      Red flags discussed and when to RTC or seek care in the ER  Supportive care, differential diagnoses, and indications for immediate follow-up discussed with patient.    Pathogenesis of diagnosis discussed including typical length and natural progression.       Instructed to return to office or nearest emergency department if symptoms fail to improve, for any change in condition, further concerns, or new concerning symptoms.  Patient states understanding of the plan of care and discharge instructions.    [place] decision making was used between myself and the family for this encounter, home care, and follow up.    Portions of this record were made  with voice recognition software.  Despite my review, spelling/grammar/context errors may still remain.  Interpretation of this chart should be taken in this context.                   full weight-bearing

## 2024-09-10 NOTE — DISCHARGE NOTE NURSING/CASE MANAGEMENT/SOCIAL WORK - PATIENT PORTAL LINK FT
You can access the FollowMyHealth Patient Portal offered by [facility] by registering at the following website: [email or web address]. By joining [facility]’s FollowMyHealth portal, you will also be able to view your health information using other applications (apps) compatible with our system.

## 2024-09-10 NOTE — DISCHARGE NOTE PROVIDER - ATTENDING DISCHARGE PHYSICAL EXAMINATION:
Vital Signs Last 24 Hrs  T(F): 98.1 (10 Sep 2024 04:48), Max: 98.8 (09 Sep 2024 21:48)  HR: 72 (10 Sep 2024 04:48) (60 - 72)  BP: 154/63 (10 Sep 2024 04:48) (138/55 - 157/75)  RR: 18 (10 Sep 2024 04:48) (14 - 19)  SpO2: 96% (09 Sep 2024 21:48) (96% - 100%)    PHYSICAL EXAM:  GENERAL: NAD, well-groomed, well-developed  HEAD:  Atraumatic, Normocephalic  EYES: EOMI, conjunctiva and sclera clear  ENMT: Moist mucous membranes, Good dentition, no thrush  NECK: Supple, No JVD  CHEST/LUNG: Clear to auscultation bilaterally, good air entry, non-labored breathing  HEART: RRR; S1/S2, 3/6 murmur   ABDOMEN: Soft, Nontender, Nondistended; Bowel sounds present  VASCULAR: Normal pulses, Normal capillary refill  EXTREMITIES: No calf tenderness, No cyanosis, No edema  LYMPH: Normal; No lymphadenopathy noted  SKIN: Warm, Intact  PSYCH: Normal mood, Normal affect  NERVOUS SYSTEM:  A/O x3, Good concentration

## 2024-09-10 NOTE — DISCHARGE NOTE PROVIDER - CARE PROVIDER_API CALL
JOSY LINDA, Phys,    Phone: [phone]  Fax: ([phone]  Follow Up Time: 1 week    Sanket España  Cardiovascular Disease  [address] [place] [id number]  Phone: [phone]  Fax: [phone]  Follow Up Time: 1 week

## 2024-09-16 ENCOUNTER — INPATIENT (INPATIENT)
Facility: HOSPITAL | Age: 89
LOS: 8 days | Discharge: HOME HEALTH | DRG: 200 | End: 2024-09-25
Attending: STUDENT IN AN ORGANIZED HEALTH CARE EDUCATION/TRAINING PROGRAM | Admitting: INTERNAL MEDICINE
Payer: MEDICAID

## 2024-09-16 VITALS
SYSTOLIC BLOOD PRESSURE: 135 MMHG | DIASTOLIC BLOOD PRESSURE: 71 MMHG | TEMPERATURE: 98 F | HEART RATE: 95 BPM | WEIGHT: 110.01 LBS | HEIGHT: 62 IN | RESPIRATION RATE: 18 BRPM | OXYGEN SATURATION: 95 %

## 2024-09-16 DIAGNOSIS — Z96.649 PRESENCE OF UNSPECIFIED ARTIFICIAL HIP JOINT: Chronic | ICD-10-CM

## 2024-09-16 DIAGNOSIS — Z98.890 OTHER SPECIFIED POSTPROCEDURAL STATES: Chronic | ICD-10-CM

## 2024-09-16 DIAGNOSIS — I50.9 HEART FAILURE, UNSPECIFIED: ICD-10-CM

## 2024-09-16 LAB
ALBUMIN SERPL ELPH-MCNC: 3.7 G/DL — SIGNIFICANT CHANGE UP (ref 3.5–5.2)
ALBUMIN SERPL ELPH-MCNC: 3.8 G/DL — SIGNIFICANT CHANGE UP (ref 3.5–5.2)
ALP SERPL-CCNC: 217 U/L — HIGH (ref 30–115)
ALP SERPL-CCNC: 228 U/L — HIGH (ref 30–115)
ALT FLD-CCNC: 127 U/L — HIGH (ref 0–41)
ALT FLD-CCNC: 138 U/L — HIGH (ref 0–41)
ANION GAP SERPL CALC-SCNC: 12 MMOL/L — SIGNIFICANT CHANGE UP (ref 7–14)
ANION GAP SERPL CALC-SCNC: 14 MMOL/L — SIGNIFICANT CHANGE UP (ref 7–14)
APTT BLD: 30.6 SEC — SIGNIFICANT CHANGE UP (ref 27–39.2)
AST SERPL-CCNC: 146 U/L — HIGH (ref 0–41)
AST SERPL-CCNC: 92 U/L — HIGH (ref 0–41)
BASOPHILS # BLD AUTO: 0.14 K/UL — SIGNIFICANT CHANGE UP (ref 0–0.2)
BASOPHILS NFR BLD AUTO: 0.3 % — SIGNIFICANT CHANGE UP (ref 0–1)
BILIRUB SERPL-MCNC: 0.5 MG/DL — SIGNIFICANT CHANGE UP (ref 0.2–1.2)
BILIRUB SERPL-MCNC: 0.5 MG/DL — SIGNIFICANT CHANGE UP (ref 0.2–1.2)
BUN SERPL-MCNC: 35 MG/DL — HIGH (ref 10–20)
BUN SERPL-MCNC: 37 MG/DL — HIGH (ref 10–20)
CALCIUM SERPL-MCNC: 9.1 MG/DL — SIGNIFICANT CHANGE UP (ref 8.4–10.5)
CALCIUM SERPL-MCNC: 9.1 MG/DL — SIGNIFICANT CHANGE UP (ref 8.4–10.5)
CHLORIDE SERPL-SCNC: 100 MMOL/L — SIGNIFICANT CHANGE UP (ref 98–110)
CHLORIDE SERPL-SCNC: 101 MMOL/L — SIGNIFICANT CHANGE UP (ref 98–110)
CO2 SERPL-SCNC: 21 MMOL/L — SIGNIFICANT CHANGE UP (ref 17–32)
CO2 SERPL-SCNC: 22 MMOL/L — SIGNIFICANT CHANGE UP (ref 17–32)
CREAT SERPL-MCNC: 0.6 MG/DL — LOW (ref 0.7–1.5)
CREAT SERPL-MCNC: 0.7 MG/DL — SIGNIFICANT CHANGE UP (ref 0.7–1.5)
EGFR: 84 ML/MIN/1.73M2 — SIGNIFICANT CHANGE UP
EGFR: 87 ML/MIN/1.73M2 — SIGNIFICANT CHANGE UP
EOSINOPHIL # BLD AUTO: 0.1 K/UL — SIGNIFICANT CHANGE UP (ref 0–0.7)
EOSINOPHIL NFR BLD AUTO: 0.2 % — SIGNIFICANT CHANGE UP (ref 0–8)
GLUCOSE BLDC GLUCOMTR-MCNC: 118 MG/DL — HIGH (ref 70–99)
GLUCOSE BLDC GLUCOMTR-MCNC: 159 MG/DL — HIGH (ref 70–99)
GLUCOSE BLDC GLUCOMTR-MCNC: 245 MG/DL — HIGH (ref 70–99)
GLUCOSE BLDC GLUCOMTR-MCNC: 253 MG/DL — HIGH (ref 70–99)
GLUCOSE SERPL-MCNC: 135 MG/DL — HIGH (ref 70–99)
GLUCOSE SERPL-MCNC: 211 MG/DL — HIGH (ref 70–99)
HCT VFR BLD CALC: 34.1 % — LOW (ref 37–47)
HGB BLD-MCNC: 11 G/DL — LOW (ref 12–16)
IMM GRANULOCYTES NFR BLD AUTO: 0.2 % — SIGNIFICANT CHANGE UP (ref 0.1–0.3)
INR BLD: 0.93 RATIO — SIGNIFICANT CHANGE UP (ref 0.65–1.3)
LYMPHOCYTES # BLD AUTO: 34.64 K/UL — HIGH (ref 1.2–3.4)
LYMPHOCYTES # BLD AUTO: 84.6 % — HIGH (ref 20.5–51.1)
MAGNESIUM SERPL-MCNC: 2.2 MG/DL — SIGNIFICANT CHANGE UP (ref 1.8–2.4)
MCHC RBC-ENTMCNC: 26.9 PG — LOW (ref 27–31)
MCHC RBC-ENTMCNC: 32.3 G/DL — SIGNIFICANT CHANGE UP (ref 32–37)
MCV RBC AUTO: 83.4 FL — SIGNIFICANT CHANGE UP (ref 81–99)
MONOCYTES # BLD AUTO: 1.74 K/UL — HIGH (ref 0.1–0.6)
MONOCYTES NFR BLD AUTO: 4.2 % — SIGNIFICANT CHANGE UP (ref 1.7–9.3)
NEUTROPHILS # BLD AUTO: 4.25 K/UL — SIGNIFICANT CHANGE UP (ref 1.4–6.5)
NEUTROPHILS NFR BLD AUTO: 10.5 % — LOW (ref 42.2–75.2)
NRBC # BLD: 0 /100 WBCS — SIGNIFICANT CHANGE UP (ref 0–0)
NT-PROBNP SERPL-SCNC: HIGH PG/ML (ref 0–300)
PLATELET # BLD AUTO: 246 K/UL — SIGNIFICANT CHANGE UP (ref 130–400)
PMV BLD: 9 FL — SIGNIFICANT CHANGE UP (ref 7.4–10.4)
POTASSIUM SERPL-MCNC: 4.7 MMOL/L — SIGNIFICANT CHANGE UP (ref 3.5–5)
POTASSIUM SERPL-MCNC: 4.8 MMOL/L — SIGNIFICANT CHANGE UP (ref 3.5–5)
POTASSIUM SERPL-SCNC: 4.7 MMOL/L — SIGNIFICANT CHANGE UP (ref 3.5–5)
POTASSIUM SERPL-SCNC: 4.8 MMOL/L — SIGNIFICANT CHANGE UP (ref 3.5–5)
PROT SERPL-MCNC: 6.6 G/DL — SIGNIFICANT CHANGE UP (ref 6–8)
PROT SERPL-MCNC: 6.8 G/DL — SIGNIFICANT CHANGE UP (ref 6–8)
PROTHROM AB SERPL-ACNC: 10.6 SEC — SIGNIFICANT CHANGE UP (ref 9.95–12.87)
RBC # BLD: 4.09 M/UL — LOW (ref 4.2–5.4)
RBC # FLD: 17.1 % — HIGH (ref 11.5–14.5)
SODIUM SERPL-SCNC: 133 MMOL/L — LOW (ref 135–146)
SODIUM SERPL-SCNC: 137 MMOL/L — SIGNIFICANT CHANGE UP (ref 135–146)
TROPONIN T, HIGH SENSITIVITY RESULT: 60 NG/L — CRITICAL HIGH (ref 6–13)
TROPONIN T, HIGH SENSITIVITY RESULT: 68 NG/L — CRITICAL HIGH (ref 6–13)
TROPONIN T, HIGH SENSITIVITY RESULT: 74 NG/L — CRITICAL HIGH (ref 6–13)
WBC # BLD: 40.95 K/UL — CRITICAL HIGH (ref 4.8–10.8)
WBC # FLD AUTO: 40.95 K/UL — CRITICAL HIGH (ref 4.8–10.8)

## 2024-09-16 PROCEDURE — 80061 LIPID PANEL: CPT

## 2024-09-16 PROCEDURE — 80048 BASIC METABOLIC PNL TOTAL CA: CPT

## 2024-09-16 PROCEDURE — 84439 ASSAY OF FREE THYROXINE: CPT

## 2024-09-16 PROCEDURE — 99285 EMERGENCY DEPT VISIT HI MDM: CPT

## 2024-09-16 PROCEDURE — 82728 ASSAY OF FERRITIN: CPT

## 2024-09-16 PROCEDURE — 84484 ASSAY OF TROPONIN QUANT: CPT

## 2024-09-16 PROCEDURE — 84480 ASSAY TRIIODOTHYRONINE (T3): CPT

## 2024-09-16 PROCEDURE — 84443 ASSAY THYROID STIM HORMONE: CPT

## 2024-09-16 PROCEDURE — 85610 PROTHROMBIN TIME: CPT

## 2024-09-16 PROCEDURE — 93880 EXTRACRANIAL BILAT STUDY: CPT

## 2024-09-16 PROCEDURE — D0150: CPT

## 2024-09-16 PROCEDURE — 85730 THROMBOPLASTIN TIME PARTIAL: CPT

## 2024-09-16 PROCEDURE — 82962 GLUCOSE BLOOD TEST: CPT

## 2024-09-16 PROCEDURE — 80053 COMPREHEN METABOLIC PANEL: CPT

## 2024-09-16 PROCEDURE — 83735 ASSAY OF MAGNESIUM: CPT

## 2024-09-16 PROCEDURE — 74174 CTA ABD&PLVS W/CONTRAST: CPT | Mod: MC

## 2024-09-16 PROCEDURE — 83540 ASSAY OF IRON: CPT

## 2024-09-16 PROCEDURE — 71045 X-RAY EXAM CHEST 1 VIEW: CPT | Mod: 26

## 2024-09-16 PROCEDURE — 97530 THERAPEUTIC ACTIVITIES: CPT | Mod: GP

## 2024-09-16 PROCEDURE — 87641 MR-STAPH DNA AMP PROBE: CPT

## 2024-09-16 PROCEDURE — 75574 CT ANGIO HRT W/3D IMAGE: CPT | Mod: MC

## 2024-09-16 PROCEDURE — 73502 X-RAY EXAM HIP UNI 2-3 VIEWS: CPT | Mod: LT

## 2024-09-16 PROCEDURE — 97116 GAIT TRAINING THERAPY: CPT | Mod: GP

## 2024-09-16 PROCEDURE — 85027 COMPLETE CBC AUTOMATED: CPT

## 2024-09-16 PROCEDURE — 83036 HEMOGLOBIN GLYCOSYLATED A1C: CPT

## 2024-09-16 PROCEDURE — 82248 BILIRUBIN DIRECT: CPT

## 2024-09-16 PROCEDURE — 85025 COMPLETE CBC W/AUTO DIFF WBC: CPT

## 2024-09-16 PROCEDURE — 87640 STAPH A DNA AMP PROBE: CPT

## 2024-09-16 PROCEDURE — D0330: CPT

## 2024-09-16 PROCEDURE — 36415 COLL VENOUS BLD VENIPUNCTURE: CPT

## 2024-09-16 PROCEDURE — 99233 SBSQ HOSP IP/OBS HIGH 50: CPT

## 2024-09-16 PROCEDURE — 93880 EXTRACRANIAL BILAT STUDY: CPT | Mod: 26

## 2024-09-16 PROCEDURE — 83880 ASSAY OF NATRIURETIC PEPTIDE: CPT

## 2024-09-16 PROCEDURE — 97162 PT EVAL MOD COMPLEX 30 MIN: CPT | Mod: GP

## 2024-09-16 PROCEDURE — 93005 ELECTROCARDIOGRAM TRACING: CPT

## 2024-09-16 PROCEDURE — 99222 1ST HOSP IP/OBS MODERATE 55: CPT

## 2024-09-16 PROCEDURE — 83550 IRON BINDING TEST: CPT

## 2024-09-16 RX ORDER — INSULIN LISPRO 100/ML
VIAL (ML) SUBCUTANEOUS
Refills: 0 | Status: DISCONTINUED | OUTPATIENT
Start: 2024-09-16 | End: 2024-09-21

## 2024-09-16 RX ORDER — FUROSEMIDE 10 MG/ML
40 INJECTION INTRAVENOUS ONCE
Refills: 0 | Status: COMPLETED | OUTPATIENT
Start: 2024-09-16 | End: 2024-09-16

## 2024-09-16 RX ORDER — LOSARTAN POTASSIUM 50 MG/1
1 TABLET ORAL
Refills: 0 | DISCHARGE

## 2024-09-16 RX ORDER — CARVEDILOL 3.125 MG
3.12 TABLET ORAL EVERY 12 HOURS
Refills: 0 | Status: DISCONTINUED | OUTPATIENT
Start: 2024-09-16 | End: 2024-09-25

## 2024-09-16 RX ORDER — ENOXAPARIN SODIUM 150 MG/ML
40 INJECTION SUBCUTANEOUS EVERY 24 HOURS
Refills: 0 | Status: DISCONTINUED | OUTPATIENT
Start: 2024-09-16 | End: 2024-09-25

## 2024-09-16 RX ORDER — INSULIN LISPRO 100/ML
3 VIAL (ML) SUBCUTANEOUS
Refills: 0 | Status: DISCONTINUED | OUTPATIENT
Start: 2024-09-16 | End: 2024-09-18

## 2024-09-16 RX ORDER — ALCOHOL ANTISEPTIC PADS
12.5 PADS, MEDICATED (EA) TOPICAL ONCE
Refills: 0 | Status: DISCONTINUED | OUTPATIENT
Start: 2024-09-16 | End: 2024-09-25

## 2024-09-16 RX ORDER — ALCOHOL ANTISEPTIC PADS
15 PADS, MEDICATED (EA) TOPICAL ONCE
Refills: 0 | Status: DISCONTINUED | OUTPATIENT
Start: 2024-09-16 | End: 2024-09-25

## 2024-09-16 RX ORDER — SODIUM CHLORIDE IRRIG SOLUTION 0.9 %
1000 SOLUTION, IRRIGATION IRRIGATION
Refills: 0 | Status: DISCONTINUED | OUTPATIENT
Start: 2024-09-16 | End: 2024-09-25

## 2024-09-16 RX ORDER — INSULIN GLARGINE 300 U/ML
12 INJECTION, SOLUTION SUBCUTANEOUS AT BEDTIME
Refills: 0 | Status: DISCONTINUED | OUTPATIENT
Start: 2024-09-16 | End: 2024-09-18

## 2024-09-16 RX ORDER — ASPIRIN 325 MG
81 TABLET ORAL DAILY
Refills: 0 | Status: DISCONTINUED | OUTPATIENT
Start: 2024-09-16 | End: 2024-09-25

## 2024-09-16 RX ORDER — FUROSEMIDE 10 MG/ML
40 INJECTION INTRAVENOUS DAILY
Refills: 0 | Status: DISCONTINUED | OUTPATIENT
Start: 2024-09-16 | End: 2024-09-17

## 2024-09-16 RX ORDER — ALCOHOL ANTISEPTIC PADS
25 PADS, MEDICATED (EA) TOPICAL ONCE
Refills: 0 | Status: DISCONTINUED | OUTPATIENT
Start: 2024-09-16 | End: 2024-09-25

## 2024-09-16 RX ORDER — GLUCAGON INJECTION, SOLUTION 0.5 MG/.1ML
1 INJECTION, SOLUTION SUBCUTANEOUS ONCE
Refills: 0 | Status: DISCONTINUED | OUTPATIENT
Start: 2024-09-16 | End: 2024-09-25

## 2024-09-16 RX ORDER — LOSARTAN POTASSIUM 100 MG/1
50 TABLET, FILM COATED ORAL EVERY 12 HOURS
Refills: 0 | Status: DISCONTINUED | OUTPATIENT
Start: 2024-09-16 | End: 2024-09-25

## 2024-09-16 RX ORDER — LOSARTAN POTASSIUM 100 MG/1
1 TABLET, FILM COATED ORAL
Refills: 0 | DISCHARGE

## 2024-09-16 RX ORDER — GABAPENTIN 800 MG/1
100 TABLET, FILM COATED ORAL EVERY 8 HOURS
Refills: 0 | Status: DISCONTINUED | OUTPATIENT
Start: 2024-09-16 | End: 2024-09-25

## 2024-09-16 RX ORDER — ATORVASTATIN CALCIUM 10 MG/1
20 TABLET, FILM COATED ORAL AT BEDTIME
Refills: 0 | Status: DISCONTINUED | OUTPATIENT
Start: 2024-09-16 | End: 2024-09-25

## 2024-09-16 RX ADMIN — INSULIN GLARGINE 12 UNIT(S): 300 INJECTION, SOLUTION SUBCUTANEOUS at 21:21

## 2024-09-16 RX ADMIN — LOSARTAN POTASSIUM 50 MILLIGRAM(S): 100 TABLET, FILM COATED ORAL at 06:44

## 2024-09-16 RX ADMIN — LOSARTAN POTASSIUM 50 MILLIGRAM(S): 100 TABLET, FILM COATED ORAL at 17:23

## 2024-09-16 RX ADMIN — GABAPENTIN 100 MILLIGRAM(S): 800 TABLET, FILM COATED ORAL at 15:46

## 2024-09-16 RX ADMIN — Medication 3 UNIT(S): at 17:19

## 2024-09-16 RX ADMIN — Medication 3: at 17:20

## 2024-09-16 RX ADMIN — Medication 3 UNIT(S): at 08:02

## 2024-09-16 RX ADMIN — GABAPENTIN 100 MILLIGRAM(S): 800 TABLET, FILM COATED ORAL at 21:22

## 2024-09-16 RX ADMIN — Medication 3.12 MILLIGRAM(S): at 17:24

## 2024-09-16 RX ADMIN — ENOXAPARIN SODIUM 40 MILLIGRAM(S): 150 INJECTION SUBCUTANEOUS at 17:19

## 2024-09-16 RX ADMIN — Medication 3.12 MILLIGRAM(S): at 06:44

## 2024-09-16 RX ADMIN — Medication 81 MILLIGRAM(S): at 11:43

## 2024-09-16 RX ADMIN — FUROSEMIDE 40 MILLIGRAM(S): 10 INJECTION INTRAVENOUS at 02:28

## 2024-09-16 RX ADMIN — Medication 3 UNIT(S): at 11:43

## 2024-09-16 RX ADMIN — ATORVASTATIN CALCIUM 20 MILLIGRAM(S): 10 TABLET, FILM COATED ORAL at 21:23

## 2024-09-16 RX ADMIN — Medication 1: at 11:44

## 2024-09-16 RX ADMIN — FUROSEMIDE 40 MILLIGRAM(S): 10 INJECTION INTRAVENOUS at 10:17

## 2024-09-16 NOTE — ED ADULT NURSE NOTE - CAS EDP DISCH DISPOSITION ADMI
Chief complaint:   Chief Complaint   Patient presents with   • Follow-up     CAD, Afib     Last Office Visit: 5/15/2017    Vitals:  Visit Vitals  /84   Pulse 69   Ht 5' 8\" (1.727 m)   Wt 112.5 kg   BMI 37.71 kg/m²       HISTORY OF PRESENT ILLNESS     HPI  Dwaine Silva is a 79 year old male who presents to clinic for a follow up visit. History includes CAD s/p PCI 2003 ([place]), PTCA/PARRISH ([facility] 3.0x12) LAD and second PARRISH ([facility] 3.0x12) in LAD in overlapping fashion 2/2015 and PTCA/PARRISH ([facility] 3.0x18) dRCA 2/2015. Other history includes Afib, HTN, hyperlipidemia, BPH, COPD, and arthritis.      He presents for routine follow up visit. Since he was last seen he stopped the Xarelto on his own. He was having a lot of problems with bleeding. He has been feeling. He is not concerned regarding a stroke at this time. He denies CP, SOB, palpitations, or dizziness.     EKG from office: Afib, rate 60s.    Echo 5/10/2017:  Mildly dilated ascending aorta (3.9 cm).  Calcified trileaflet aortic valve with mild regurgitation.  Normal left ventricular cavity size and systolic function with no regional wall motion abnormalities; EF 55%.  Moderately increased left atrial volume 43.8 ml/m².  Normal right ventricular size and systolic function; RVSP 23 mmHg.  Normal IVC dimension with >50% respiratory variability.    Echo 10/2015:  Mild global left ventricular hypokinesis, no regional wall motion abnormalities; EF 50%.  Mild aortic valve calcification with mild stenosis and trace regurgitation.  Mildly increased left ventricular filling pressure, mildly increased left atrial volume 42 ml/m².  Mild mitral valve regurgitation.  Normal right ventricular size and systolic function, RVSP 34 mmHg.    Other significant problems:  Patient Active Problem List    Diagnosis Date Noted   • Aortic sclerosis 03/27/2017     Priority: Low   • Atrial fibrillation (CMS/[facility]) 09/26/2016     Priority: Low   • Nonrheumatic aortic valve stenosis  09/26/2016     Priority: Low   • Lumbar stenosis with neurogenic claudication 09/08/2016     Priority: Low   • Spondylolisthesis at L4-L5 level 09/08/2016     Priority: Low   • Lumbosacral spondylosis without myelopathy 09/08/2016     Priority: Low   • Arthritis involving multiple sites 12/11/2013     Priority: Low   • Bilateral leg edema 06/27/2013     Priority: Low   • Routine general medical examination at a health care facility 04/18/2013     Priority: Low   • Screening for prostate cancer 04/18/2013     Priority: Low   • COPD (chronic obstructive pulmonary disease) (CMS/HCC) 09/25/2012     Priority: Low   • Special screening for malignant neoplasms, colon 06/25/2012     Priority: Low   • Hyperlipidemia 05/16/2012     Priority: Low   • Hypertension 05/16/2012     Priority: Low   • BPH (benign prostatic hyperplasia) 05/16/2012     Priority: Low   • CAD (coronary artery disease) 05/16/2012     Priority: Low   • Obesity 05/16/2012     Priority: Low   • Hemorrhoids 05/16/2012     Priority: Low       PAST MEDICAL, FAMILY AND SOCIAL HISTORY     Medications:  Current Outpatient Prescriptions   Medication Sig   • atorvastatin (LIPITOR) 40 MG tablet TAKE 1 TABLET BY MOUTH  DAILY   • losartan (COZAAR) 100 MG tablet TAKE 1 TABLET BY MOUTH  DAILY   • metoPROLOL tartrate (LOPRESSOR) 25 MG tablet TAKE 1 TABLET BY MOUTH  EVERY 12 HOURS   • terazosin (HYTRIN) 5 MG capsule TAKE 1 CAPSULE BY MOUTH TWO TIMES DAILY   • spironolactone (ALDACTONE) 50 MG tablet TAKE 1 TABLET BY MOUTH  DAILY   • clopidogrel (PLAVIX) 75 MG tablet TAKE 1 TABLET BY MOUTH  DAILY   • Menthol, Topical Analgesic, (ASPERCREME HEAT) 10 % Gel Apply 1 application topically 4 times daily as needed (to relieve pain).   • Menthol, Topical Analgesic, 16 % Liquid Apply 1 application topically 4 times daily as needed (to relieve pain).   • budesonide-formoterol (SYMBICORT) 160-4.5 MCG/ACT inhaler Inhale 2 puffs into the lungs 2 times daily.   • SAW PALMETTO, SERENOA  REPENS, PO Take 1 tablet by mouth 2 times daily.   • Multiple Vitamins-Minerals (MULTI FOR HIM PO) Take 1 tablet by mouth daily.   • albuterol (PROAIR HFA) 108 (90 BASE) MCG/ACT inhaler Inhale 2 puffs into the lungs every 4 hours as needed for Shortness of Breath or Wheezing.     No current facility-administered medications for this visit.      Allergies:  ALLERGIES:  No Known Allergies    Past Medical  History/Surgeries:  Past Medical History:   Diagnosis Date   • AF (atrial fibrillation) (CMS/[facility])    • Arthritis    • Benign neoplasm of colon    • BPH (benign prostatic hyperplasia)    • COPD (chronic obstructive pulmonary disease) (CMS/[facility])    • Coronary artery disease    • Essential (primary) hypertension    • Hyperlipidemia 2000   • Lumbar herniated disc 10/2016    coming for lumbar surgery   • Old myocardial infarction ~2002    SIlent MI   • RAD (reactive airway disease)    • Unspecified hemorrhoids without mention of complication        Past Surgical History:   Procedure Laterality Date   • Colonoscopy w biopsy  6/25/2014    Dr Ellington/ recall 2019   • Coronary angioplasty     • Coronary stent placement  2003    2 stents   • Eye surgery  2/12/2014    Right Cataract Extraction W/ IOL   • Eye surgery  3/19/2014    Left Cataract Extraction W/ IOL    • Hemorrhoid surgery  1982   • Left heart cath,percutaneous  1/28/2015    Cardiac Cath   • Ligation of hemorrhoid(s) rubber band  6/25/2014    Dr Ellington   • Lumbar spine surgery  10/19/2016    L3, L4, L5 laminectomies with L4-S1 interbody fusion/[facility] Dr Pratt/ 10/19/16   • Reconstr total shoulder implant Right 12/16/2015    Right Revewse shoulder arthroplasty   • Spine surgery  Mid 90's    L4-5 partial laminectomy       Family History:  Family History   Problem Relation Age of Onset   • Cancer Father    • Coronary Artery Disease Sister    • Asthma Brother        Social History:  Social History   Substance Use Topics   • Smoking status: Former Smoker     Years:  15.00     Types: Cigarettes     Quit date: 1/1/1988   • Smokeless tobacco: Never Used   • Alcohol use 1.0 oz/week     2 Standard drinks or equivalent per week      Comment: OCCASIONALLY       REVIEW OF SYSTEMS     Review of Systems   Constitutional: Negative for fatigue.   Respiratory: Negative for chest tightness and shortness of breath.    Cardiovascular: Positive for leg swelling (unchanged, R>L). Negative for chest pain and palpitations.   Musculoskeletal: Positive for arthralgias. Negative for myalgias.   Neurological: Negative for dizziness, syncope and light-headedness.   All other systems reviewed and are negative.      PHYSICAL EXAM     Physical Exam   Constitutional: He is oriented to person, place, and time. He appears well-developed and well-nourished. No distress.   HENT:   Head: Atraumatic.   Mouth/Throat: No oropharyngeal exudate.   Eyes: Conjunctivae and EOM are normal. No scleral icterus.   Neck: Neck supple. Normal carotid pulses and no JVD present. Carotid bruit is not present.   Short neck stature with widened circumference.   Cardiovascular: Normal rate.  An irregular rhythm present. Exam reveals no gallop and no decreased pulses.    Murmur heard.   Crescendo systolic (early peaking) murmur is present with a grade of 4/6   Pulmonary/Chest: Effort normal and breath sounds normal. He has no wheezes. He has no rales.   Abdominal: Bowel sounds are normal. There is no tenderness.   Musculoskeletal: Normal range of motion. He exhibits edema (+1-2 pitting BLE). He exhibits no tenderness.   Neurological: He is alert and oriented to person, place, and time. He exhibits normal muscle tone. Coordination normal.   Skin: Skin is warm and dry.   Psychiatric: He has a normal mood and affect. His behavior is normal.   Nursing note and vitals reviewed.      ASSESSMENT:     · Stable chronic ischemic heart disease s/p PTCA/PARRISH LAD & RCA 2/2015. No interval angina.   · Chronic subclinical Afib managed with  rate-control strategy. Patient stopped Xarelto on own due to bruising. [id number] score 4.  · Unchanged bilateral leg edema. BP remains well within normal range on current medication regimen.  · Chronic valvular heart disease with mild aortic valve regurgitation.  · Normal LVEF by recent echo    Recommendations:  Discussed importance of anticoagulation and stroke prevention. He does not want to start any other forms of anticoagulation.   Discussed Watchman device with patient. Will have research nurse talk to patient and send information. Patient agrees to have nurse call and discuss Watchman device with him.  Continue current medications.   Advised to start ASA 81 mg daily.   Follow up based on Watchman device.     On 7/9/2018, ISadaf RN scribed the services personally performed by Nahid Hammonds MD    The documentation recorded by the scribe accurately and completely reflects the service(s) I personally performed and the decisions made by me.     Nahid Hammonds MD. [facility]         Telemetry

## 2024-09-16 NOTE — H&P ADULT - HISTORY OF PRESENT ILLNESS
88F pmhx DM, HTN, aortic stenosis, DM, CAD, D-CHF presents for shortness of breath overnight. Patient was hospitalized last week with dyspnea and chest pain, underwent stress test which was positive. Patient then had left heart cath which identified single vessel disease and severe aortic stenosis. Daughter states that TAVR was deferred as patient is at increased risk due to her age. Patient was supposed to follow up with Dr. España in a few days outpatient. Patient was on lasix during last admission but was not discharged home with it. Patient denies chest pain. She states that shortness of breath feels better after initial dose of lasix and oxygen administration. Patient also with increased swelling of lower extremities

## 2024-09-16 NOTE — ED PROVIDER NOTE - CARE PLAN
1 Principal Discharge DX:	Acute on chronic heart failure  Secondary Diagnosis:	Fluid overload   Principal Discharge DX:	Acute on chronic heart failure  Secondary Diagnosis:	Fluid overload  Secondary Diagnosis:	Transaminitis  Secondary Diagnosis:	Leukocytosis

## 2024-09-16 NOTE — ED PROVIDER NOTE - OBJECTIVE STATEMENT
88 years old female history of CLL, heart failure (EF 40 to 45%), CAD (recent cardiac cath 1 week ago found aortic stenosis and 1 artery occlusion), hypertension, diabetes, aortic stenosis presents complaint of chest pressure and having trouble breathing this evening.  As per daughter, patient had no complaint earlier today.  Patient started having trouble breathing and chest pressure this evening so daughter bring patient to ED for evaluation.  Otherwise denies fever, chills, coughing, recent illness, abdominal pain, vomiting or diarrhea.

## 2024-09-16 NOTE — H&P ADULT - ASSESSMENT
88F pmhx DM, HTN, aortic stenosis, DM, CAD, D-CHF presents for shortness of breath overnight. Patient recently admitted last week s/p [facility].       #acute on chronic D-CHF with fluid overload - BNP 26k  #elevated troponin 68-74  #aortic stenosis   #CAD s/p [facility] 9/9 single vessel disease  #HTN  - IV lasix 40mg QD   - cardiology consult: Dr. España   - continue atorvastatin, aspirin, losartan, carvedilol   - daughter states that TAVR was deferred because patient is at increased risk due to advance age - f/u cardiology to reconsider intervention   - f/u 11am troponin   - strict i/o, daily weights, fluid restriction     #elevated LFTs   - f/u repeat labs 11am     #DM  - hold po acarbose, janumet, jardiance   - continue lantus + sliding scale        88F pmhx DM, HTN, aortic stenosis, DM, CAD, D-CHF presents for shortness of breath overnight. Patient recently admitted last week s/p [facility].       #acute on chronic D-CHF with fluid overload - BNP 26k  #elevated troponin 68-74  #aortic stenosis   #CAD s/p [facility] 9/9 single vessel disease  #HTN  - IV lasix 40mg QD   - cardiology consult: Dr. España   - continue atorvastatin, aspirin, losartan, carvedilol   - daughter states that TAVR was deferred because patient is at increased risk due to advance age - f/u cardiology to reconsider intervention   - f/u 11am troponin   - strict i/o, daily weights, fluid restriction     #elevated LFTs   - f/u repeat labs 11am     #DM  - hold po acarbose, janumet, jardiance   - basal bolus + iss, monitor fingersticks

## 2024-09-16 NOTE — ED PROVIDER NOTE - CLINICAL SUMMARY MEDICAL DECISION MAKING FREE TEXT BOX
88-year-old female, past medical history of hypertension, diabetes, hyperlipidemia, CHF, aortic stenosis, presenting for right-sided chest pain and shortness of breath that started while she was trying to go to sleep tonight, no alleviating or aggravating factors, feels the same as when she was admitted to the hospital 9/3.  During the last admission patient had a catheterization as well as a pharmacological nuclear stress test.  Stress test revealed reversible defect consistent with ischemia.  Mild global hypokinesis on echo with moderate to severe aortic stenosis.  Daughter consented to cardiac cath but at the time did not want a TAVR.  Denies fever, URI symptoms, nausea, vomiting, abdominal pain.  Elderly female on exam.  In no acute distress but uncomfortable appearing.  Vitals reviewed.  Heart RRR.  Lungs CTAB.  Abdomen soft nondistended nontender.  Bilateral lower extremity 2+ pitting edema which is at baseline as per daughter.    Labs and EKG were ordered and reviewed.  Imaging was ordered and reviewed by me.  Appropriate medications for patient's presenting complaints were ordered and effects were reassessed.  Patient's records (prior hospital) were reviewed.  Additional history was obtained from daughter.  Escalation to admission/observation was considered.  Patient requires inpatient hospitalization. 88-year-old female, past medical history of hypertension, diabetes, hyperlipidemia, CHF, aortic stenosis, presenting for right-sided chest pain and shortness of breath that started while she was trying to go to sleep tonight, no alleviating or aggravating factors, feels the same as when she was admitted to the hospital 9/3.  During the last admission patient had a catheterization as well as a pharmacological nuclear stress test.  Stress test revealed reversible defect consistent with ischemia.  Mild global hypokinesis on echo with moderate to severe aortic stenosis.  Daughter consented to cardiac cath but at the time did not want a TAVR.  Denies fever, URI symptoms, nausea, vomiting, abdominal pain.  Elderly female on exam.  In no acute distress but uncomfortable appearing.  Vitals reviewed.  Heart RRR.  Lungs CTAB.  Abdomen soft nondistended nontender.  Bilateral lower extremity 2+ pitting edema which is at baseline as per daughter.    Labs and EKG were ordered and reviewed.  EKG unchanged.  Troponin elevated with delta 6.  Worsening transaminitis and leukocytosis.  Imaging was ordered and reviewed by me.  Bilateral pleural effusions seen prior.  Appropriate medications for patient's presenting complaints were ordered and effects were reassessed.  Patient's records (prior hospital) were reviewed.  Additional history was obtained from daughter.  Escalation to admission/observation was considered.  Patient requires inpatient hospitalization.

## 2024-09-16 NOTE — H&P ADULT - NSHPLABSRESULTS_GEN_ALL_CORE
11.0   40.95 )-----------( 246      ( 16 Sep 2024 01:26 )             34.1       09-16    133<L>  |  100  |  37<H>  ----------------------------<  211<H>  4.8   |  21  |  0.7    Ca    9.1      16 Sep 2024 01:26  Mg     2.2     09-16    TPro  6.6  /  Alb  3.7  /  TBili  0.5  /  DBili  x   /  AST  146<H>  /  ALT  138<H>  /  AlkPhos  228<H>  09-16          Magnesium: 2.2 mg/dL (09-16-24 @ 01:26)      PT/INR - ( 16 Sep 2024 01:26 )   PT: 10.60 sec;   INR: 0.93 ratio         PTT - ( 16 Sep 2024 01:26 )  PTT:30.6 sec

## 2024-09-16 NOTE — CONSULT NOTE ADULT - SUBJECTIVE AND OBJECTIVE BOX
HPI:  88F pmhx DM, HTN, aortic stenosis, DM, CAD, D-CHF presents for shortness of breath overnight. Patient was hospitalized last week with dyspnea and chest pain, underwent stress test which was positive. Patient then had left heart cath which identified single vessel disease and severe aortic stenosis. Daughter states that TAVR was deferred as patient is at increased risk due to her age. Patient was supposed to follow up with Dr. España in a few days outpatient. Patient was on lasix during last admission but was not discharged home with it. Patient denies chest pain. She states that shortness of breath feels better after initial dose of lasix and oxygen administration. Patient also with increased swelling of lower extremities  (16 Sep 2024 04:11)      PAST MEDICAL & SURGICAL HISTORY  HTN (hypertension)    Diabetes    Vertigo    Abnormal cardiac valve    Status post hip replacement    H/O cardiac catheterization        FAMILY HISTORY:  FAMILY HISTORY:      SOCIAL HISTORY:  []smoker  []Alcohol  []Drug    ALLERGIES:  No Known Drug Allergies  Alcohol (Hives; Angioedema)      MEDICATIONS:  MEDICATIONS  (STANDING):  aspirin enteric coated 81 milliGRAM(s) Oral daily  atorvastatin 20 milliGRAM(s) Oral at bedtime  carvedilol 3.125 milliGRAM(s) Oral every 12 hours  dextrose 5%. 1000 milliLiter(s) (100 mL/Hr) IV Continuous <Continuous>  dextrose 5%. 1000 milliLiter(s) (50 mL/Hr) IV Continuous <Continuous>  dextrose 50% Injectable 12.5 Gram(s) IV Push once  dextrose 50% Injectable 25 Gram(s) IV Push once  dextrose 50% Injectable 25 Gram(s) IV Push once  furosemide   Injectable 40 milliGRAM(s) IV Push daily  glucagon  Injectable 1 milliGRAM(s) IntraMuscular once  insulin glargine Injectable (LANTUS) 12 Unit(s) SubCutaneous at bedtime  insulin lispro (ADMELOG) corrective regimen sliding scale   SubCutaneous three times a day before meals  insulin lispro Injectable (ADMELOG) 3 Unit(s) SubCutaneous three times a day before meals  losartan 50 milliGRAM(s) Oral every 12 hours    MEDICATIONS  (PRN):  dextrose Oral Gel 15 Gram(s) Oral once PRN Blood Glucose LESS THAN 70 milliGRAM(s)/deciliter      HOME MEDICATIONS:  Home Medications:  acarbose 50 mg oral tablet: 1 tab(s) orally 3 times a day (03 Sep 2024 15:42)  aspirin 81 mg oral delayed release tablet: 1 tab(s) orally once a day (10 Sep 2024 11:10)  carvedilol 3.125 mg oral tablet: 1 tab(s) orally 2 times a day (03 Sep 2024 15:42)  Janumet 50 mg-500 mg oral tablet: 1 tab(s) orally once a day (03 Sep 2024 15:43)  Jardiance 25 mg oral tablet: 1 tab(s) orally once a day (03 Sep 2024 15:43)  Lantus 100 units/mL subcutaneous solution: 10 unit(s) subcutaneous once a day (at bedtime) (03 Sep 2024 15:43)  losartan 50 mg oral tablet: 1 tab(s) orally 2 times a day (16 Sep 2024 04:43)      VITALS:   T(F): 97.5 (09-16 @ 05:00), Max: 98.4 (09-16 @ 04:17)  HR: 83 (09-16 @ 05:00) (83 - 95)  BP: 123/71 (09-16 @ 05:00) (116/59 - 135/71)  BP(mean): --  RR: 18 (09-16 @ 05:00) (18 - 18)  SpO2: 99% (09-16 @ 05:00) (95% - 99%)    I&O's Summary      REVIEW OF SYSTEMS:  CONSTITUTIONAL: No weakness, fevers or chills  EYES: No visual changes  ENT: No vertigo or throat pain   NECK: No pain or stiffness  RESPIRATORY: No cough, 8 wheezing, hemoptysis; No shortness of breath  CARDIOVASCULAR: No chest pain or palpitations  GASTROINTESTINAL: No abdominal or epigastric pain. No nausea, vomiting, or hematemesis; No diarrhea or constipation. No melena or hematochezia.  GENITOURINARY: No dysuria, frequency or hematuria  NEUROLOGICAL: No numbness or weakness  SKIN: No itching, no rashes  MSK: no    PHYSICAL EXAM:  NEURO: patient is awake , alert and oriented  GEN: Not in acute distress  NECK: no thyroid enlargement, no JVD  LUNGS: Wheeze right side   CARDIOVASCULAR: S1/S2 present, RRR , no murmurs or rubs, no carotid bruits,  + PP bilaterally  ABD: Soft, non-tender, non-distended, +BS  EXT: +B/L MAXIM  SKIN: Intact    LABS:                        11.0   40.95 )-----------( 246      ( 16 Sep 2024 01:26 )             34.1     09-16    133<L>  |  100  |  37<H>  ----------------------------<  211<H>  4.8   |  21  |  0.7    Ca    9.1      16 Sep 2024 01:26  Mg     2.2     09-16    TPro  6.6  /  Alb  3.7  /  TBili  0.5  /  DBili  x   /  AST  146<H>  /  ALT  138<H>  /  AlkPhos  228<H>  09-16    PT/INR - ( 16 Sep 2024 01:26 )   PT: 10.60 sec;   INR: 0.93 ratio         PTT - ( 16 Sep 2024 01:26 )  PTT:30.6 sec          Troponin trend: 68; 74            RADIOLOGY:  -CXR:  < from: Xray Chest 1 View-PORTABLE IMMEDIATE (09.03.24 @ 11:38) >  Impression:    Bilateral opacities and effusions.        --- End of Report ---            RICARDO MCCALL MD; Attending Radiologist  This document has been electronically signed. Sep  3 2024 12:08PM    < end of copied text >    -TTE:  < from: TTE Echo Complete w/o Contrast w/ Doppler (09.04.24 @ 07:37) >  Summary:   1. Left ventricular ejection fraction, by visual estimation, is 50 to   55%.   2. Mildly decreased segmental left ventricular systolic function.   3. Mid and apical anterior septum, apical inferior segment, and apex are   abnormal as described above.   4. Mild left ventricular hypertrophy.   5. Normal right ventricular size and function.   6. Mild to moderately enlarged left atrium.   7. Mild mitral annular calcification.   8. Mild to moderate mitral valve regurgitation.   9. Moderate tricuspid regurgitation.  10. Mild aortic regurgitation.  11. Moderate to severe aortic valve stenosis.  12. Mild pulmonic valve regurgitation.  13. There is mild aortic root calcification.    < end of copied text >    -CCTA:  -STRESS TEST:  < from: NM Nuclear Stress Pharmacologic Multiple (09.04.24 @ 14:47) >  Impression:  1.   Small to moderate size severe reversible defect in the inferolateral   wall and small reversible defect in the anteroseptal wall of the left   ventricle consistent with ischemia and suggesting ischemia in multiple   coronary territories.  2.  Dilated left ventricle with mild global hypokinesis. All walls of the   left ventricle thicken.  3.  Left ventricular ejection fraction calculated as 46% which is low.   Wall motion analysis suggests ejection fraction of 40-45%.   Echocardiographic ejection fraction by visual estimation 50-55% on   09/04/2024    --- End of Report ---            TARAN SOLANO DO; Attending Nuclear Medicine  This document has been electronically signed. Sep  4 2024  2:57PM    < end of copied text >    -CATHETERIZATION:  < from: Cardiac Catheterization (09.09.24 @ 15:50) >      < end of copied text >      ECG:  < from: 12 Lead ECG (09.04.24 @ 07:30) >    Ventricular Rate 59 BPM    Atrial Rate 59 BPM    P-R Interval 136 ms    QRS Duration 78 ms    Q-T Interval 500 ms    QTC Calculation(Bazett) 495 ms    P Axis 11 degrees    R Axis -51 degrees    T Axis 220 degrees    Diagnosis Line Sinus bradycardia  Left anterior fascicular block  Anteroseptal infarct , age undetermined  T wave abnormality, consider inferolateral ischemia  Abnormal ECG    Confirmed by Sanket España ([id number]) on 9/4/2024 8:07:07 AM    < end of copied text >      TELEMETRY EVENTS:  None

## 2024-09-16 NOTE — ED PROVIDER NOTE - PHYSICAL EXAMINATION
CONSTITUTIONAL: Chronically ill elderly female; in no apparent distress.   EYES: PERRL; EOM intact.   CARDIOVASCULAR: Normal S1, S2; no murmurs, rubs, or gallops.   RESPIRATORY: Respiratory rate 18.  No accessory muscle use.  Bilateral basal rales.  GI/: Normal bowel sounds; non-distended; non-tender; no palpable organomegaly.   MS: 2+  pitting edema to bilateral lower extremity.  SKIN: Normal for age and race; warm; dry; good turgor; no apparent lesions or exudate.   NEURO/PSYCH: A & O x 3; grossly unremarkable.

## 2024-09-16 NOTE — ED ADULT NURSE NOTE - NSFALLHARMRISKINTERV_ED_ALL_ED

## 2024-09-16 NOTE — CHART NOTE - NSCHARTNOTEFT_GEN_A_CORE
Patient c/o tingling/burning sensation,  with pain in feet bilaterally  - has Hx of Diabetes    Possibly Diabetic Neuropathy:     Start: Gabapentin 100mg PO Q8h and adjust as per response to Rx

## 2024-09-16 NOTE — PATIENT PROFILE ADULT - FALL HARM RISK - HARM RISK INTERVENTIONS

## 2024-09-16 NOTE — ED PROVIDER NOTE - WR ORDER DATE AND TIME 1
----- Message from Alla Rock RN sent at 1/29/2019  4:15 PM CST -----  Regarding: INR and Warfarin  Pt's INR today was 3.5.     Current Coumadin dosage is to 12 mg on Mon and Thursdays, and 11 mg the rest of the days of the week.    Please update on new dosage.     When would you like a recheck?    Next appt with you is 2/7/2019.     Pt stated and showed staff that he had picked up his pill box and will put his medications in it when he gets home, he wanted you to know.       
Called Dr. Roe and informed him of this message.  V.O. Dr. Roe/Curry RN  Change Coumadin to 11 mg po daily.  Recheck INR on 2/7 (with 2/7 F/U visit).  Contacted pt and informed him of INR and new Coumadin, INR orders.  Pt. verbalizes understanding, repeating instructions back correctly, stating he is writing these instructions down.  Pt. verified he has not taken Coumadin yet today, so will change to 11 mg dose daily starting tonight.  Pt. also states he's been \"eating a lot of veggies lately\" and yes, many were dark, green, leafy veggies.  Explained need to keep diet balanced, with consistently eating same amounts daily, rather than extremes one day and none for days.  Pt. verbalizes understanding, agreement.  See AMB Anticoag flowsheet.    
16-Sep-2024 01:21

## 2024-09-16 NOTE — H&P ADULT - NSHPPHYSICALEXAM_GEN_ALL_CORE
VITALS:   T(C): 36.9 (09-16-24 @ 04:17), Max: 36.9 (09-16-24 @ 04:17)  HR: 85 (09-16-24 @ 04:17) (85 - 95)  BP: 116/59 (09-16-24 @ 04:17) (116/59 - 135/71)  RR: 18 (09-16-24 @ 01:11) (18 - 18)  SpO2: 98% (09-16-24 @ 04:17) (95% - 98%)    GENERAL: NAD, lying in bed comfortably  HEAD:  Atraumatic, normocephalic  EYES: EOMI, PERRLA, conjunctiva and sclera clear  ENT: Moist mucous membranes  HEART: Regular rate and rhythm + crescendo decrescendo murmur   LUNGS: Unlabored respirations.  Clear to auscultation bilaterally  ABDOMEN: Soft, nontender, nondistended,  EXTREMITIES:+++ pitting edema b/l lower extremities   NERVOUS SYSTEM:  A&Ox3, no focal deficits   SKIN: No rashes or lesions

## 2024-09-16 NOTE — PATIENT PROFILE ADULT - FUNCTIONAL ASSESSMENT - BASIC MOBILITY 6.
3-calculated by average/Not able to assess (calculate score using [facility] averaging method)

## 2024-09-16 NOTE — PATIENT PROFILE ADULT - DIETITIAN.
Pt recently moved and reports having muscle aches from the right side of her head, neck, and arm. Pt reports this has happened before and states nothing has helped her. Pt has tried heat, ice, and tylenol. A&Ox4  
dietitian/nutrition services

## 2024-09-16 NOTE — CONSULT NOTE ADULT - NS ATTEND AMEND GEN_ALL_CORE FT
Patient seen and examined. Pertinent labs, imaging and telemetry reviewed. I agree with the above:     Patient feeling better. Spoke with daughter over the phone who assisted with translation.   -Patient had been having nausea at home and then also developed SOB last night which brought her in.   -Previous admission showing severe AS with 1V CAD (pLAD) and plan for further evaluation and work up for TAVR as outpatient.   -Patient was discharged without diuretics and likely became volume overloaded again.   -Continue with lasix 40mg IVP daily  -Strict I&Os with daily standing weights.   -Continue with valsartan and carvedilol.   -Discussing with structural heart if further TAVR work up inpatient warranted.   -Continue with current management and will discuss with hospitalist and daughter next steps.

## 2024-09-17 LAB
ALBUMIN SERPL ELPH-MCNC: 3.2 G/DL — LOW (ref 3.5–5.2)
ALP SERPL-CCNC: 188 U/L — HIGH (ref 30–115)
ALT FLD-CCNC: 151 U/L — HIGH (ref 0–41)
ANION GAP SERPL CALC-SCNC: 9 MMOL/L — SIGNIFICANT CHANGE UP (ref 7–14)
AST SERPL-CCNC: 106 U/L — HIGH (ref 0–41)
BASOPHILS # BLD AUTO: 0 K/UL — SIGNIFICANT CHANGE UP (ref 0–0.2)
BASOPHILS NFR BLD AUTO: 0 % — SIGNIFICANT CHANGE UP (ref 0–1)
BILIRUB SERPL-MCNC: 0.4 MG/DL — SIGNIFICANT CHANGE UP (ref 0.2–1.2)
BUN SERPL-MCNC: 34 MG/DL — HIGH (ref 10–20)
CALCIUM SERPL-MCNC: 8.7 MG/DL — SIGNIFICANT CHANGE UP (ref 8.4–10.5)
CHLORIDE SERPL-SCNC: 104 MMOL/L — SIGNIFICANT CHANGE UP (ref 98–110)
CO2 SERPL-SCNC: 27 MMOL/L — SIGNIFICANT CHANGE UP (ref 17–32)
CREAT SERPL-MCNC: 0.5 MG/DL — LOW (ref 0.7–1.5)
EGFR: 91 ML/MIN/1.73M2 — SIGNIFICANT CHANGE UP
EOSINOPHIL NFR BLD AUTO: 0 % — SIGNIFICANT CHANGE UP (ref 0–8)
GLUCOSE BLDC GLUCOMTR-MCNC: 112 MG/DL — HIGH (ref 70–99)
GLUCOSE BLDC GLUCOMTR-MCNC: 162 MG/DL — HIGH (ref 70–99)
GLUCOSE BLDC GLUCOMTR-MCNC: 202 MG/DL — HIGH (ref 70–99)
GLUCOSE BLDC GLUCOMTR-MCNC: 300 MG/DL — HIGH (ref 70–99)
GLUCOSE SERPL-MCNC: 108 MG/DL — HIGH (ref 70–99)
HCT VFR BLD CALC: 34.8 % — LOW (ref 37–47)
HGB BLD-MCNC: 11.3 G/DL — LOW (ref 12–16)
LYMPHOCYTES # BLD AUTO: 23.41 K/UL — HIGH (ref 1.2–3.4)
LYMPHOCYTES # BLD AUTO: 75 % — HIGH (ref 20.5–51.1)
MAGNESIUM SERPL-MCNC: 2 MG/DL — SIGNIFICANT CHANGE UP (ref 1.8–2.4)
MCHC RBC-ENTMCNC: 27.6 PG — SIGNIFICANT CHANGE UP (ref 27–31)
MCHC RBC-ENTMCNC: 32.5 G/DL — SIGNIFICANT CHANGE UP (ref 32–37)
MCV RBC AUTO: 85.1 FL — SIGNIFICANT CHANGE UP (ref 81–99)
MONOCYTES # BLD AUTO: 1.56 K/UL — HIGH (ref 0.1–0.6)
MONOCYTES NFR BLD AUTO: 5 % — SIGNIFICANT CHANGE UP (ref 1.7–9.3)
NEUTROPHILS # BLD AUTO: 4.06 K/UL — SIGNIFICANT CHANGE UP (ref 1.4–6.5)
NEUTROPHILS NFR BLD AUTO: 13 % — LOW (ref 42.2–75.2)
NRBC # BLD: SIGNIFICANT CHANGE UP /100 WBCS (ref 0–0)
PLATELET # BLD AUTO: 249 K/UL — SIGNIFICANT CHANGE UP (ref 130–400)
PMV BLD: 9 FL — SIGNIFICANT CHANGE UP (ref 7.4–10.4)
POTASSIUM SERPL-MCNC: 4.1 MMOL/L — SIGNIFICANT CHANGE UP (ref 3.5–5)
POTASSIUM SERPL-SCNC: 4.1 MMOL/L — SIGNIFICANT CHANGE UP (ref 3.5–5)
PROT SERPL-MCNC: 6.2 G/DL — SIGNIFICANT CHANGE UP (ref 6–8)
RBC # BLD: 4.09 M/UL — LOW (ref 4.2–5.4)
RBC # FLD: 17.4 % — HIGH (ref 11.5–14.5)
SODIUM SERPL-SCNC: 140 MMOL/L — SIGNIFICANT CHANGE UP (ref 135–146)
WBC # BLD: 31.21 K/UL — HIGH (ref 4.8–10.8)
WBC # FLD AUTO: 31.21 K/UL — HIGH (ref 4.8–10.8)

## 2024-09-17 PROCEDURE — 99222 1ST HOSP IP/OBS MODERATE 55: CPT

## 2024-09-17 PROCEDURE — 99233 SBSQ HOSP IP/OBS HIGH 50: CPT

## 2024-09-17 RX ORDER — SENNOSIDES 8.6 MG
2 TABLET ORAL AT BEDTIME
Refills: 0 | Status: DISCONTINUED | OUTPATIENT
Start: 2024-09-17 | End: 2024-09-25

## 2024-09-17 RX ORDER — FUROSEMIDE 10 MG/ML
40 INJECTION INTRAVENOUS
Refills: 0 | Status: DISCONTINUED | OUTPATIENT
Start: 2024-09-17 | End: 2024-09-19

## 2024-09-17 RX ORDER — ACETAMINOPHEN 325 MG
650 TABLET ORAL EVERY 6 HOURS
Refills: 0 | Status: DISCONTINUED | OUTPATIENT
Start: 2024-09-17 | End: 2024-09-25

## 2024-09-17 RX ADMIN — GABAPENTIN 100 MILLIGRAM(S): 800 TABLET, FILM COATED ORAL at 05:03

## 2024-09-17 RX ADMIN — ATORVASTATIN CALCIUM 20 MILLIGRAM(S): 10 TABLET, FILM COATED ORAL at 22:19

## 2024-09-17 RX ADMIN — Medication 3 UNIT(S): at 08:21

## 2024-09-17 RX ADMIN — LOSARTAN POTASSIUM 50 MILLIGRAM(S): 100 TABLET, FILM COATED ORAL at 18:42

## 2024-09-17 RX ADMIN — Medication 3.12 MILLIGRAM(S): at 05:05

## 2024-09-17 RX ADMIN — Medication 17 GRAM(S): at 18:42

## 2024-09-17 RX ADMIN — Medication 650 MILLIGRAM(S): at 15:54

## 2024-09-17 RX ADMIN — Medication 3.12 MILLIGRAM(S): at 18:40

## 2024-09-17 RX ADMIN — GABAPENTIN 100 MILLIGRAM(S): 800 TABLET, FILM COATED ORAL at 22:19

## 2024-09-17 RX ADMIN — Medication 3 UNIT(S): at 11:53

## 2024-09-17 RX ADMIN — Medication 81 MILLIGRAM(S): at 11:52

## 2024-09-17 RX ADMIN — Medication 650 MILLIGRAM(S): at 14:45

## 2024-09-17 RX ADMIN — ENOXAPARIN SODIUM 40 MILLIGRAM(S): 150 INJECTION SUBCUTANEOUS at 18:41

## 2024-09-17 RX ADMIN — INSULIN GLARGINE 12 UNIT(S): 300 INJECTION, SOLUTION SUBCUTANEOUS at 22:21

## 2024-09-17 RX ADMIN — LOSARTAN POTASSIUM 50 MILLIGRAM(S): 100 TABLET, FILM COATED ORAL at 05:04

## 2024-09-17 RX ADMIN — GABAPENTIN 100 MILLIGRAM(S): 800 TABLET, FILM COATED ORAL at 13:19

## 2024-09-17 RX ADMIN — Medication 2 TABLET(S): at 22:19

## 2024-09-17 RX ADMIN — Medication 2: at 11:53

## 2024-09-17 RX ADMIN — Medication 3 UNIT(S): at 18:48

## 2024-09-17 RX ADMIN — FUROSEMIDE 40 MILLIGRAM(S): 10 INJECTION INTRAVENOUS at 13:19

## 2024-09-17 RX ADMIN — FUROSEMIDE 40 MILLIGRAM(S): 10 INJECTION INTRAVENOUS at 05:04

## 2024-09-17 RX ADMIN — Medication 3: at 18:48

## 2024-09-17 NOTE — PROGRESS NOTE ADULT - SUBJECTIVE AND OBJECTIVE BOX
LENGTH OF HOSPITAL STAY: 1d    CHIEF COMPLAINT:    Patient is a 88y old  Female who presents with a chief complaint of shortness of breath (16 Sep 2024 07:48)    OVERNIGHT EVENTS:    - tele reviewed by me; NSR w/ no events  - no overnight events  - pt examined at bedside, c/o BL foot pain, otherwise no new complaints  - tolerating PO, having BMs, making urine without urinary sxs    FOLLOW UP:    - transfer to  at [facility] for ?TAVR w/u    HPI:    88F pmhx DM, HTN, aortic stenosis, DM, CAD, D-CHF presents for shortness of breath overnight. Patient was hospitalized last week with dyspnea and chest pain, underwent stress test which was positive. Patient then had left heart cath which identified single vessel disease and severe aortic stenosis. Daughter states that TAVR was deferred as patient is at increased risk due to her age. Patient was supposed to follow up with Dr. España in a few days outpatient. Patient was on lasix during last admission but was not discharged home with it. Patient denies chest pain. She states that shortness of breath feels better after initial dose of lasix and oxygen administration. Patient also with increased swelling of lower extremities  (16 Sep 2024 04:11)    ALLERGIES:    No Known Drug Allergies  Alcohol (Hives; Angioedema)      PMHx:    HTN (hypertension)  Diabetes  Vertigo  Abnormal cardiac valve  Status post hip replacement  H/O cardiac catheterization      SOCIAL Hx:    - Malagasy speaking woman    MEDICATIONS:  STANDING MEDICATIONS  aspirin enteric coated 81 milliGRAM(s) Oral daily  atorvastatin 20 milliGRAM(s) Oral at bedtime  carvedilol 3.125 milliGRAM(s) Oral every 12 hours  dextrose 5%. 1000 milliLiter(s) IV Continuous <Continuous>  dextrose 5%. 1000 milliLiter(s) IV Continuous <Continuous>  dextrose 50% Injectable 12.5 Gram(s) IV Push once  dextrose 50% Injectable 25 Gram(s) IV Push once  dextrose 50% Injectable 25 Gram(s) IV Push once  enoxaparin Injectable 40 milliGRAM(s) SubCutaneous every 24 hours  furosemide   Injectable 40 milliGRAM(s) IV Push two times a day  gabapentin 100 milliGRAM(s) Oral every 8 hours  glucagon  Injectable 1 milliGRAM(s) IntraMuscular once  insulin glargine Injectable (LANTUS) 12 Unit(s) SubCutaneous at bedtime  insulin lispro (ADMELOG) corrective regimen sliding scale   SubCutaneous three times a day before meals  insulin lispro Injectable (ADMELOG) 3 Unit(s) SubCutaneous three times a day before meals  losartan 50 milliGRAM(s) Oral every 12 hours    PRN MEDICATIONS  dextrose Oral Gel 15 Gram(s) Oral once PRN      LABS:                        11.3   31.21 )-----------( 249      ( 17 Sep 2024 06:18 )             34.8     09-17    140  |  104  |  34[H]  ----------------------------<  108[H]  4.1   |  27  |  0.5[L]    Ca    8.7      17 Sep 2024 06:18  Mg     2.0     09-17    TPro  6.2  /  Alb  3.2[L]  /  TBili  0.4  /  DBili  x   /  AST  106[H]  /  ALT  151[H]  /  AlkPhos  188[H]  09-17    PT/INR - ( 16 Sep 2024 01:26 )   PT: 10.60 sec;   INR: 0.93 ratio         PTT - ( 16 Sep 2024 01:26 )  PTT:30.6 sec  Urinalysis Basic - ( 17 Sep 2024 06:18 )    Color: x / Appearance: x / SG: x / pH: x  Gluc: 108 mg/dL / Ketone: x  / Bili: x / Urobili: x   Blood: x / Protein: x / Nitrite: x   Leuk Esterase: x / RBC: x / WBC x   Sq Epi: x / Non Sq Epi: x / Bacteria: x    RADIOLOGY:    < from: VA Duplex Carotid, Bilat (09.16.24 @ 16:34) >    IMPRESSION: No significant hemodynamic stenosis of either carotid artery.   Less than 50% stenosis bilaterally with minimal plaque burden.    Measurement of carotid stenosis is based on updated recommendations for   carotid stenosis interpretation criteria from the Intersocietal   Accreditation Commission (IAC) Vascular Testing Board, modified from the   Society of Radiologists in Ultrasound (SRU) Consensus Conference Criteria   for Internal Carotid Artery Stenosis.    --- End of Report ---      < end of copied text >  < from: Xray Chest 1 View- PORTABLE-Urgent (09.16.24 @ 01:49) >    Impression:    Stable bilateral lung opacities    Prominence to the heart and mediastinum.    Further evaluation with a PA view the chest is recommended    --- End of Report ---          < end of copied text >      VITALS:   T(F): 97.9  HR: 70  BP: 132/67  RR: 18  SpO2: 98%        PHYSICAL EXAM:    Gen: NAD, resting in bed; thin frail  HEENT: Normocephalic, atraumatic  Neck:  no lymphadenopathy  CV: Regular rate & regular rhythm  Lungs: decreased BS at bases, no fremitus  Abdomen: Soft, BS present, nontender  Ext: Warm, well perfused no pitting edema; L dorsal  to palpation, R medial  to palpation  Neuro: moves all extremities against resistance, no droop, awake  Skin: no rash, no erythema

## 2024-09-17 NOTE — PHYSICAL THERAPY INITIAL EVALUATION ADULT - NSPTDISCHREC_GEN_A_CORE
[facility] Pregnancy And Lactation Text: This medication is Pregnancy Category X and is absolutely contraindicated during pregnancy. It is unknown if it is excreted in breast milk. Rehab Facility vs Home PT

## 2024-09-17 NOTE — CONSULT NOTE ADULT - SUBJECTIVE AND OBJECTIVE BOX
HPI:   Patient is a 88F pmhx DM, HTN, aortic stenosis, DM, CAD, D-CHF presents for shortness of breath overnight. Patient was hospitalized last week with dyspnea and chest pain, underwent stress test which was positive. Patient then had left heart cath which identified single vessel disease and severe aortic stenosis. Daughter states that TAVR was deferred as patient is at increased risk due to her age. Patient was supposed to follow up with Dr. España in a few days outpatient. Patient was on lasix during last admission but was not discharged home with it. Patient denies chest pain. She states that shortness of breath feels better after initial dose of lasix and oxygen administration. Patient also with increased swelling of lower extremities        PAST MEDICAL & SURGICAL HISTORY:  HTN (hypertension)  Diabetes  Vertigo  Abnormal cardiac valve  Status post hip replacement  H/O cardiac catheterization      REVIEW OF SYSTEMS: All other systems negative    MEDICATIONS  (STANDING):  aspirin enteric coated 81 milliGRAM(s) Oral daily  atorvastatin 20 milliGRAM(s) Oral at bedtime  carvedilol 3.125 milliGRAM(s) Oral every 12 hours  dextrose 5%. 1000 milliLiter(s) (100 mL/Hr) IV Continuous <Continuous>  dextrose 5%. 1000 milliLiter(s) (50 mL/Hr) IV Continuous <Continuous>  dextrose 50% Injectable 12.5 Gram(s) IV Push once  dextrose 50% Injectable 25 Gram(s) IV Push once  dextrose 50% Injectable 25 Gram(s) IV Push once  enoxaparin Injectable 40 milliGRAM(s) SubCutaneous every 24 hours  furosemide   Injectable 40 milliGRAM(s) IV Push two times a day  gabapentin 100 milliGRAM(s) Oral every 8 hours  glucagon  Injectable 1 milliGRAM(s) IntraMuscular once  insulin glargine Injectable (LANTUS) 12 Unit(s) SubCutaneous at bedtime  insulin lispro (ADMELOG) corrective regimen sliding scale   SubCutaneous three times a day before meals  insulin lispro Injectable (ADMELOG) 3 Unit(s) SubCutaneous three times a day before meals  losartan 50 milliGRAM(s) Oral every 12 hours  polyethylene glycol 3350 17 Gram(s) Oral two times a day  senna 2 Tablet(s) Oral at bedtime    MEDICATIONS  (PRN):  acetaminophen     Tablet .. 650 milliGRAM(s) Oral every 6 hours PRN Mild Pain (1 - 3)  dextrose Oral Gel 15 Gram(s) Oral once PRN Blood Glucose LESS THAN 70 milliGRAM(s)/deciliter      Allergies  No Known Drug Allergies  Alcohol (Hives; Angioedema)    Intolerances        SOCIAL HISTORY:   From Home     FAMILY HISTORY:         PHYSICAL EXAM:  Vital Signs Last 24 Hrs  T(C): 36.6 (17 Sep 2024 04:00), Max: 36.7 (16 Sep 2024 20:15)  T(F): 97.9 (17 Sep 2024 04:00), Max: 98.1 (16 Sep 2024 20:15)  HR: 70 (17 Sep 2024 04:00) (69 - 82)  BP: 132/67 (17 Sep 2024 04:00) (124/66 - 132/67)  BP(mean): --  RR: 18 (17 Sep 2024 04:00) (18 - 18)  SpO2: 98% (17 Sep 2024 04:00) (98% - 98%)    Parameters below as of 17 Sep 2024 04:00  Patient On (Oxygen Delivery Method): nasal cannula  O2 Flow (L/min): 2       General : NAD    HEENT:  NC/AT, PERRL, EOMI, sclera clear, mucosa moist, throat clear, trachea midline, neck supple  Cardiovascular: RRR   Respiratory:  On 2l NC  Gastrointestinal :  Soft NT/ND (+)BS   Neurology:  Weakened strength & sensation   Psych: Calm  Musculoskeletal:  limited   Skin:  moist w/ good turgor      LABS/ CULTURES/ RADIOLOGY:                        11.3   31.21 )-----------( 249      ( 17 Sep 2024 06:18 )             34.8       140  |  104  |  34  ----------------------------<  108      [09-17-24 @ 06:18]  4.1   |  27  |  0.5        Ca     8.7     [09-17-24 @ 06:18]      Mg     2.0     [09-17-24 @ 06:18]    TPro  6.2  /  Alb  3.2  /  TBili  0.4  /  DBili  x   /  AST  106  /  ALT  151  /  AlkPhos  188  [09-17-24 @ 06:18]    PT/INR: PT 10.60, INR 0.93       [09-16-24 @ 01:26]  PTT: 30.6       [09-16-24 @ 01:26]

## 2024-09-17 NOTE — PROGRESS NOTE ADULT - ASSESSMENT
88F pmhx DM, HTN, aortic stenosis, DM, CAD, D-CHF presents for HFpEF exacerbation 2/2 ?sAS    #acute hypoxic resp failure   # HFpEF exaccerbation  # severe AS  - cardio recs appreciated  - lasix iv 40 bid  - now on 2L NC --> ween O2 as tolerated  - will transfer [address] under Dr. 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   88F pmhx DM, HTN, aortic stenosis, DM, CAD, D-CHF presents for HFpEF exacerbation 2/2 ?sAS    #acute hypoxic resp failure   # HFpEF exaccerbation  # severe AS  - cardio recs appreciated  - lasix iv 40 bid  - now on 2L NC --> ween O2 as tolerated  - will transfer [address] under Dr. 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   88F pmhx DM, HTN, aortic stenosis, DM, CAD, D-CHF presents for HFpEF exacerbation 2/2 ?sAS    #acute hypoxic resp failure   # HFpEF exaccerbation  # severe AS  - cardio recs appreciated  - lasix iv 40 bid  - now on 2L NC --> ween O2 as tolerated  - will transfer [address] under Dr. 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

## 2024-09-17 NOTE — PHYSICAL THERAPY INITIAL EVALUATION ADULT - GENERAL OBSERVATIONS, REHAB EVAL
13:40-14:15 Chart reviewed. Pt encountered sitting up in bed in chair position, may be seen by Physical Therapist as confirmed with Nurse. Patient denied pain at rest and ready to get up; +tele/ O2 via NC/ primafit

## 2024-09-17 NOTE — PHYSICAL THERAPY INITIAL EVALUATION ADULT - GAIT DEVIATIONS NOTED, PT EVAL
stooped posture, dec heel strike/pushoff/decreased clinton/decreased step length/decreased weight-shifting ability

## 2024-09-17 NOTE — PHYSICAL THERAPY INITIAL EVALUATION ADULT - ADDITIONAL COMMENTS
Per patient, there are 4 steps outside with (L) rail going up and 8 steps inside with (L) rail going up

## 2024-09-17 NOTE — PHYSICAL THERAPY INITIAL EVALUATION ADULT - PERTINENT HX OF CURRENT PROBLEM, REHAB EVAL
87 y/o female admitted with diagnosis of Heart failure, returned to ED for shortness of breath and increased swelling of lower extremities, pending TAVR evaluation/ work up

## 2024-09-18 ENCOUNTER — TRANSCRIPTION ENCOUNTER (OUTPATIENT)
Age: 89
End: 2024-09-18

## 2024-09-18 LAB
A1C WITH ESTIMATED AVERAGE GLUCOSE RESULT: 8.4 % — HIGH (ref 4–5.6)
ALBUMIN SERPL ELPH-MCNC: 3.4 G/DL — LOW (ref 3.5–5.2)
ALP SERPL-CCNC: 173 U/L — HIGH (ref 30–115)
ALT FLD-CCNC: 154 U/L — HIGH (ref 0–41)
ANION GAP SERPL CALC-SCNC: 11 MMOL/L — SIGNIFICANT CHANGE UP (ref 7–14)
ANION GAP SERPL CALC-SCNC: 13 MMOL/L — SIGNIFICANT CHANGE UP (ref 7–14)
ANISOCYTOSIS BLD QL: SIGNIFICANT CHANGE UP
APTT BLD: 30.1 SEC — SIGNIFICANT CHANGE UP (ref 27–39.2)
AST SERPL-CCNC: 90 U/L — HIGH (ref 0–41)
BASOPHILS # BLD AUTO: 0 K/UL — SIGNIFICANT CHANGE UP (ref 0–0.2)
BASOPHILS NFR BLD AUTO: 0 % — SIGNIFICANT CHANGE UP (ref 0–1)
BILIRUB SERPL-MCNC: 0.5 MG/DL — SIGNIFICANT CHANGE UP (ref 0.2–1.2)
BUN SERPL-MCNC: 44 MG/DL — HIGH (ref 10–20)
BUN SERPL-MCNC: 46 MG/DL — HIGH (ref 10–20)
CALCIUM SERPL-MCNC: 8.7 MG/DL — SIGNIFICANT CHANGE UP (ref 8.4–10.4)
CALCIUM SERPL-MCNC: 8.7 MG/DL — SIGNIFICANT CHANGE UP (ref 8.4–10.5)
CHLORIDE SERPL-SCNC: 101 MMOL/L — SIGNIFICANT CHANGE UP (ref 98–110)
CHLORIDE SERPL-SCNC: 102 MMOL/L — SIGNIFICANT CHANGE UP (ref 98–110)
CHOLEST SERPL-MCNC: 191 MG/DL — SIGNIFICANT CHANGE UP
CO2 SERPL-SCNC: 22 MMOL/L — SIGNIFICANT CHANGE UP (ref 17–32)
CO2 SERPL-SCNC: 23 MMOL/L — SIGNIFICANT CHANGE UP (ref 17–32)
CREAT SERPL-MCNC: 0.5 MG/DL — LOW (ref 0.7–1.5)
CREAT SERPL-MCNC: 0.8 MG/DL — SIGNIFICANT CHANGE UP (ref 0.7–1.5)
EGFR: 71 ML/MIN/1.73M2 — SIGNIFICANT CHANGE UP
EGFR: 91 ML/MIN/1.73M2 — SIGNIFICANT CHANGE UP
EOSINOPHIL # BLD AUTO: 0 K/UL — SIGNIFICANT CHANGE UP (ref 0–0.7)
EOSINOPHIL NFR BLD AUTO: 0 % — SIGNIFICANT CHANGE UP (ref 0–8)
ESTIMATED AVERAGE GLUCOSE: 194 MG/DL — HIGH (ref 68–114)
FERRITIN SERPL-MCNC: 58 NG/ML — SIGNIFICANT CHANGE UP (ref 13–330)
GLUCOSE BLDC GLUCOMTR-MCNC: 198 MG/DL — HIGH (ref 70–99)
GLUCOSE BLDC GLUCOMTR-MCNC: 305 MG/DL — HIGH (ref 70–99)
GLUCOSE BLDC GLUCOMTR-MCNC: 331 MG/DL — HIGH (ref 70–99)
GLUCOSE BLDC GLUCOMTR-MCNC: 350 MG/DL — HIGH (ref 70–99)
GLUCOSE SERPL-MCNC: 184 MG/DL — HIGH (ref 70–99)
GLUCOSE SERPL-MCNC: 297 MG/DL — HIGH (ref 70–99)
HCT VFR BLD CALC: 35.4 % — LOW (ref 37–47)
HDLC SERPL-MCNC: 73 MG/DL — SIGNIFICANT CHANGE UP
HGB BLD-MCNC: 11.2 G/DL — LOW (ref 12–16)
INR BLD: 0.96 RATIO — SIGNIFICANT CHANGE UP (ref 0.65–1.3)
IRON SATN MFR SERPL: 15 % — SIGNIFICANT CHANGE UP (ref 15–50)
IRON SATN MFR SERPL: 33 UG/DL — LOW (ref 35–150)
LIPID PNL WITH DIRECT LDL SERPL: 102 MG/DL — HIGH
LYMPHOCYTES # BLD AUTO: 25.41 K/UL — HIGH (ref 1.2–3.4)
LYMPHOCYTES # BLD AUTO: 83 % — HIGH (ref 20.5–51.1)
MACROCYTES BLD QL: SIGNIFICANT CHANGE UP
MAGNESIUM SERPL-MCNC: 2 MG/DL — SIGNIFICANT CHANGE UP (ref 1.8–2.4)
MAGNESIUM SERPL-MCNC: 2 MG/DL — SIGNIFICANT CHANGE UP (ref 1.8–2.4)
MANUAL SMEAR VERIFICATION: SIGNIFICANT CHANGE UP
MCHC RBC-ENTMCNC: 27.5 PG — SIGNIFICANT CHANGE UP (ref 27–31)
MCHC RBC-ENTMCNC: 31.6 G/DL — LOW (ref 32–37)
MCV RBC AUTO: 87 FL — SIGNIFICANT CHANGE UP (ref 81–99)
MONOCYTES # BLD AUTO: 0.31 K/UL — SIGNIFICANT CHANGE UP (ref 0.1–0.6)
MONOCYTES NFR BLD AUTO: 1 % — LOW (ref 1.7–9.3)
MRSA PCR RESULT.: NEGATIVE — SIGNIFICANT CHANGE UP
NEUTROPHILS # BLD AUTO: 3.67 K/UL — SIGNIFICANT CHANGE UP (ref 1.4–6.5)
NEUTROPHILS NFR BLD AUTO: 12 % — LOW (ref 42.2–75.2)
NON HDL CHOLESTEROL: 118 MG/DL — SIGNIFICANT CHANGE UP
NRBC # BLD: 0 /100 WBCS — SIGNIFICANT CHANGE UP (ref 0–0)
NRBC # BLD: SIGNIFICANT CHANGE UP /100 WBCS (ref 0–0)
NT-PROBNP SERPL-SCNC: 8914 PG/ML — HIGH (ref 0–300)
PLAT MORPH BLD: NORMAL — SIGNIFICANT CHANGE UP
PLATELET # BLD AUTO: 272 K/UL — SIGNIFICANT CHANGE UP (ref 130–400)
PMV BLD: 9.6 FL — SIGNIFICANT CHANGE UP (ref 7.4–10.4)
POTASSIUM SERPL-MCNC: 4.5 MMOL/L — SIGNIFICANT CHANGE UP (ref 3.5–5)
POTASSIUM SERPL-MCNC: 5 MMOL/L — SIGNIFICANT CHANGE UP (ref 3.5–5)
POTASSIUM SERPL-SCNC: 4.5 MMOL/L — SIGNIFICANT CHANGE UP (ref 3.5–5)
POTASSIUM SERPL-SCNC: 5 MMOL/L — SIGNIFICANT CHANGE UP (ref 3.5–5)
PROT SERPL-MCNC: 6.2 G/DL — SIGNIFICANT CHANGE UP (ref 6–8)
PROTHROM AB SERPL-ACNC: 10.9 SEC — SIGNIFICANT CHANGE UP (ref 9.95–12.87)
RBC # BLD: 4.07 M/UL — LOW (ref 4.2–5.4)
RBC # FLD: 18 % — HIGH (ref 11.5–14.5)
RBC BLD AUTO: ABNORMAL
SMUDGE CELLS # BLD: PRESENT — SIGNIFICANT CHANGE UP
SODIUM SERPL-SCNC: 135 MMOL/L — SIGNIFICANT CHANGE UP (ref 135–146)
SODIUM SERPL-SCNC: 137 MMOL/L — SIGNIFICANT CHANGE UP (ref 135–146)
T4 FREE SERPL-MCNC: 1 NG/DL — SIGNIFICANT CHANGE UP (ref 0.9–1.8)
TIBC SERPL-MCNC: 224 UG/DL — SIGNIFICANT CHANGE UP (ref 220–430)
TRIGL SERPL-MCNC: 86 MG/DL — SIGNIFICANT CHANGE UP
TSH SERPL-MCNC: 0.05 UIU/ML — LOW (ref 0.27–4.2)
UIBC SERPL-MCNC: 191 UG/DL — SIGNIFICANT CHANGE UP (ref 110–370)
VARIANT LYMPHS # BLD: 4 % — SIGNIFICANT CHANGE UP (ref 0–5)
WBC # BLD: 30.61 K/UL — HIGH (ref 4.8–10.8)
WBC # FLD AUTO: 30.61 K/UL — HIGH (ref 4.8–10.8)

## 2024-09-18 PROCEDURE — 99233 SBSQ HOSP IP/OBS HIGH 50: CPT

## 2024-09-18 PROCEDURE — 73502 X-RAY EXAM HIP UNI 2-3 VIEWS: CPT | Mod: 26,LT

## 2024-09-18 RX ORDER — INSULIN GLARGINE 300 U/ML
10 INJECTION, SOLUTION SUBCUTANEOUS AT BEDTIME
Refills: 0 | Status: DISCONTINUED | OUTPATIENT
Start: 2024-09-18 | End: 2024-09-23

## 2024-09-18 RX ORDER — CHLORHEXIDINE GLUCONATE ORAL RINSE 1.2 MG/ML
1 SOLUTION DENTAL DAILY
Refills: 0 | Status: DISCONTINUED | OUTPATIENT
Start: 2024-09-18 | End: 2024-09-25

## 2024-09-18 RX ADMIN — Medication 2 TABLET(S): at 21:10

## 2024-09-18 RX ADMIN — Medication 4: at 17:25

## 2024-09-18 RX ADMIN — Medication 650 MILLIGRAM(S): at 14:49

## 2024-09-18 RX ADMIN — ATORVASTATIN CALCIUM 20 MILLIGRAM(S): 10 TABLET, FILM COATED ORAL at 21:11

## 2024-09-18 RX ADMIN — Medication 3 UNIT(S): at 08:06

## 2024-09-18 RX ADMIN — Medication 3 UNIT(S): at 11:22

## 2024-09-18 RX ADMIN — CHLORHEXIDINE GLUCONATE ORAL RINSE 1 APPLICATION(S): 1.2 SOLUTION DENTAL at 11:23

## 2024-09-18 RX ADMIN — Medication 650 MILLIGRAM(S): at 03:19

## 2024-09-18 RX ADMIN — GABAPENTIN 100 MILLIGRAM(S): 800 TABLET, FILM COATED ORAL at 05:37

## 2024-09-18 RX ADMIN — GABAPENTIN 100 MILLIGRAM(S): 800 TABLET, FILM COATED ORAL at 13:08

## 2024-09-18 RX ADMIN — Medication 650 MILLIGRAM(S): at 15:20

## 2024-09-18 RX ADMIN — Medication 650 MILLIGRAM(S): at 04:50

## 2024-09-18 RX ADMIN — FUROSEMIDE 40 MILLIGRAM(S): 10 INJECTION INTRAVENOUS at 13:08

## 2024-09-18 RX ADMIN — Medication 1: at 08:06

## 2024-09-18 RX ADMIN — FUROSEMIDE 40 MILLIGRAM(S): 10 INJECTION INTRAVENOUS at 05:37

## 2024-09-18 RX ADMIN — ENOXAPARIN SODIUM 40 MILLIGRAM(S): 150 INJECTION SUBCUTANEOUS at 17:25

## 2024-09-18 RX ADMIN — LOSARTAN POTASSIUM 50 MILLIGRAM(S): 100 TABLET, FILM COATED ORAL at 17:25

## 2024-09-18 RX ADMIN — Medication 4: at 11:22

## 2024-09-18 RX ADMIN — Medication 3.12 MILLIGRAM(S): at 05:37

## 2024-09-18 RX ADMIN — LOSARTAN POTASSIUM 50 MILLIGRAM(S): 100 TABLET, FILM COATED ORAL at 05:38

## 2024-09-18 RX ADMIN — Medication 81 MILLIGRAM(S): at 11:23

## 2024-09-18 RX ADMIN — Medication 17 GRAM(S): at 05:37

## 2024-09-18 RX ADMIN — INSULIN GLARGINE 10 UNIT(S): 300 INJECTION, SOLUTION SUBCUTANEOUS at 21:11

## 2024-09-18 RX ADMIN — GABAPENTIN 100 MILLIGRAM(S): 800 TABLET, FILM COATED ORAL at 21:11

## 2024-09-18 RX ADMIN — Medication 3.12 MILLIGRAM(S): at 17:25

## 2024-09-18 NOTE — PROGRESS NOTE ADULT - SUBJECTIVE AND OBJECTIVE BOX
Chief complaint: Patient is a 88y old  Female who presents with a chief complaint of shortness of breath (17 Sep 2024 14:05)    Hospital Course: 88F pmhx DM, HTN, aortic stenosis, DM, CAD, D-CHF presents for shortness of breath overnight. Patient was hospitalized last week with dyspnea and chest pain, underwent stress test which was positive. Patient then had left heart cath which identified single vessel disease and severe aortic stenosis. Daughter states that TAVR was deferred as patient is at increased risk due to her age. Patient was supposed to follow up with Dr. España in a few days outpatient. Patient was on lasix during last admission but was not discharged home with it. Patient denies chest pain. She states that shortness of breath feels better after initial dose of lasix and oxygen administration. Patient also with increased swelling of lower extremities     Interval history: Pt was seen and examined at bedside in NAD. Pt denies SOB, CP, Palpitations.     Review of systems: A complete 10-point review of systems was obtained and is negative except as stated in the interval history.    Vitals:  T(F): 98.4, Max: 98.5 ( @ 20:44)  HR: 73 (70 - 77)  BP: 128/62 (128/62 - 150/81)  RR: 18 (18 - 18)  SpO2: 96% (96% - 99%)    Ins & outs:      @ :  -   @ 07:00  --------------------------------------------------------  IN: 0 mL / OUT: 200 mL / NET: -200 mL     @ 07:  -   @ 02:05  --------------------------------------------------------  IN: 0 mL / OUT: 1050 mL / NET: -1050 mL      Weight trend:  Weight (kg): 49.3 ()    Physical exam:  General: No apparent distress  HEENT: Anicteric sclera. Moist mucous membranes.   Cardiac: Regular rate and rhythm. No murmurs, rubs, or gallops.   Vascular: Symmetric radial pulses. Dorsalis pedis pulses palpable.   Respiratory: Normal effort. Clear to ascultation.   Abdomen: Soft, nontender. Audible bowel sounds.   Extremities: Warm with mild b/L LE edema. No cyanosis or clubbing.   Skin: Warm and dry. No rash.   Neurologic: Grossly normal motor function.   Psychiatric: Oriented to person, place, and time.     Data reviewed:  - Telemetry:   - EC/4/24: Diagnosis Line Sinus bradycardia. Left anterior fascicular block. Anteroseptal infarct , age undetermined. T wave abnormality, consider inferolateral ischemia. with 59bpm   - TTE:   24:   1. Left ventricular ejection fraction, by visual estimation, is 50 to   55%.   2. Mildly decreased segmental left ventricular systolic function.   3. Mid and apical anterior septum, apical inferior segment, and apex are   abnormal as described above.   4. Mild left ventricular hypertrophy.   5. Normal right ventricular size and function.   6. Mild to moderately enlarged left atrium.   7. Mild mitral annular calcification.   8. Mild to moderate mitral valve regurgitation.   9. Moderate tricuspid regurgitation.  10. Mild aortic regurgitation.  11. Moderate to severe aortic valve stenosis.  12. Mild pulmonic valve regurgitation.  13. There is mild aortic root calcification.  - Chest x-ray   24:   Stable bilateral lung opacities  Prominence to the heart and mediastinum.  Further evaluation with a PA view the chest is recommended  - Stress test:   24:   1.   Small to moderate size severe reversible defect in the inferolateral   wall and small reversible defect in the anteroseptal wall of the left   ventricle consistent with ischemia and suggesting ischemia in multiple   coronary territories.  2.  Dilated left ventricle with mild global hypokinesis. All walls of the   left ventricle thicken.  3.  Left ventricular ejection fraction calculated as 46% which is low.   Wall motion analysis suggests ejection fraction of 40-45%.   Echocardiographic ejection fraction by visual estimation 50-55% on   2024  - CCTA:  - Cardiac catheterization:   24:   POST-OP DIAGNOSIS:    Severe single vessel disease  Severe AS    Coronary Dominance: Right    LM: short segment engaged    LAD: Medium Sized Vessel  pLAD: 85% lesion at distal third   mLAD: moderate atherosclerotic disease  dLAD: mild atherosclerotic disease  D1: severe ostial stenosis, small vessel  D2: minor luminal irregularities    CX: medium sized vessel  pLcx: minor luminal irregularities  dLcx: tortuous vessel with minor luminal irregularities  OM1: Large tortuous vessel, no significant disease    RCA: Medium sized, dominant vessel  pRCA: mild atherosclerotic disease  mRCA: mild atherosclerotic disease  dRCA: minor luminal irregularities  RPDA: no significant disease  RPL: no significant disease    LVEDP: 15 mmHg     EF: 50 % by Echo    PA % sat: 75.2%  SaO2 % sat: 98.5%    RA pressure: 9 mmHg  RV pressure: 40/13 mmHg  PA pressure: 44/17/25 mmHg  PCWP: 23/17/14 mmHg    CVP: 9 mmHg  CO/CI Jc: 3.24 L/min - 2.16 L/min/m2  PVR (woods units): 2.47 Woods units  SVR (Dynes.s/cm5):   1728 dynes.s/cm5    AV gradient: 40 mmHg (peak to peak gradient)  ALEX: 0.51 cm2 (by Hakki equation)      - Cardiac MRI:    - Labs:                        11.3   31.21 )-----------( 249      ( 17 Sep 2024 06:18 )             34.8         140  |  104  |  34[H]  ----------------------------<  108[H]  4.1   |  27  |  0.5[L]    Ca    8.7      17 Sep 2024 06:18  Mg     2.0         TPro  6.2  /  Alb  3.2[L]  /  TBili  0.4  /  DBili  x   /  AST  106[H]  /  ALT  151[H]  /  AlkPhos  188[H]      Urinalysis Basic - ( 17 Sep 2024 06:18 )    Color: x / Appearance: x / SG: x / pH: x  Gluc: 108 mg/dL / Ketone: x  / Bili: x / Urobili: x   Blood: x / Protein: x / Nitrite: x   Leuk Esterase: x / RBC: x / WBC x   Sq Epi: x / Non Sq Epi: x / Bacteria: x        Medications:  aspirin enteric coated 81 milliGRAM(s) Oral daily  atorvastatin 20 milliGRAM(s) Oral at bedtime  carvedilol 3.125 milliGRAM(s) Oral every 12 hours  chlorhexidine 2% Cloths 1 Application(s) Topical daily  dextrose 50% Injectable 12.5 Gram(s) IV Push once  dextrose 50% Injectable 25 Gram(s) IV Push once  dextrose 50% Injectable 25 Gram(s) IV Push once  enoxaparin Injectable 40 milliGRAM(s) SubCutaneous every 24 hours  furosemide   Injectable 40 milliGRAM(s) IV Push two times a day  gabapentin 100 milliGRAM(s) Oral every 8 hours  glucagon  Injectable 1 milliGRAM(s) IntraMuscular once  insulin glargine Injectable (LANTUS) 12 Unit(s) SubCutaneous at bedtime  insulin lispro (ADMELOG) corrective regimen sliding scale   SubCutaneous three times a day before meals  insulin lispro Injectable (ADMELOG) 3 Unit(s) SubCutaneous three times a day before meals  losartan 50 milliGRAM(s) Oral every 12 hours  polyethylene glycol 3350 17 Gram(s) Oral two times a day  senna 2 Tablet(s) Oral at bedtime    Drips:  dextrose 5%. 1000 milliLiter(s) (50 mL/Hr) IV Continuous <Continuous>  dextrose 5%. 1000 milliLiter(s) (100 mL/Hr) IV Continuous <Continuous>    PRN:     Allergies    No Known Drug Allergies  Alcohol (Hives; Angioedema)    Intolerances

## 2024-09-18 NOTE — PROGRESS NOTE ADULT - ASSESSMENT
Assessment:  88F pmhx DM, HTN, aortic stenosis, DM, CAD, D-CHF presents for HFpEF exacerbation 2/2 AS    Problems discussed and associated plan:  #acute hypoxic resp failure   # HFpEF exaccerbation  - lasix iv 40 bid  - Strict I&O's   - Daily weights   - Pro-BNP: 75196  9/16     # severe AS  - lasix iv 40 bid  - now on 2L NC --> ween O2 as tolerated  - TAVR evaluation/w/u    # NSTEMI II Demand ischemia  - troponin 68-->74-->60  - c/w ASA 81mg     # LFTs  - likely 2/2 overload  - RUQ if not improving    # mild chronic anemia  - f/u iron labs     # HTN  - c/w losartan 50 BID   - c/w carvedilol 3.125 Bid    # HLD  - c/w atorvastatin 20 QD    #DMII   - ISS     #constipation   - miralax bid  - senna 2 qHS    #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 Perez.    #Misc  DVT prophylaxis: Enoxaparin 40mg QD   GI prophylaxis:   Please contact me with any questions or concerns at [phone].   Assessment:  88F pmhx DM, HTN, aortic stenosis, DM, CAD, D-CHF presents for HFpEF exacerbation 2/2 AS    Problems discussed and associated plan:  #acute hypoxic resp failure   # HFpEF exaccerbation  - lasix iv 40 bid  - Strict I&O's   - Daily weights   - Pro-BNP: 25499  9/16   - Follow up AM labs     # severe AS  - lasix iv 40 bid  - now on 2L NC --> ween O2 as tolerated  - TAVR evaluation/w/u    # NSTEMI II Demand ischemia  - troponin 68-->74-->60  - c/w ASA 81mg     # LFTs  - likely 2/2 overload  - RUQ if not improving    # mild chronic anemia  - f/u iron labs     # HTN  - c/w losartan 50 BID   - c/w carvedilol 3.125 Bid    # HLD  - c/w atorvastatin 20 QD    #DMII   - ISS   - Hold home acarbose, janumet, jardiance     #constipation   - miralax bid  - senna 2 qHS    #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 Perez.    #Misc  DVT prophylaxis: Enoxaparin 40mg QD   GI prophylaxis:   Please contact me with any questions or concerns at [phone].

## 2024-09-18 NOTE — DISCHARGE NOTE NURSING/CASE MANAGEMENT/SOCIAL WORK - PATIENT PORTAL LINK FT
You can access the FollowMyHealth Patient Portal offered by [facility] by registering at the following website: [email or web address]. By joining [email or web address]’s FollowMyHealth portal, you will also be able to view your health information using other applications (apps) compatible with our system.

## 2024-09-19 ENCOUNTER — APPOINTMENT (OUTPATIENT)
Dept: CARDIOLOGY | Facility: CLINIC | Age: 89
End: 2024-09-19

## 2024-09-19 LAB
ANION GAP SERPL CALC-SCNC: 12 MMOL/L — SIGNIFICANT CHANGE UP (ref 7–14)
ANION GAP SERPL CALC-SCNC: 8 MMOL/L — SIGNIFICANT CHANGE UP (ref 7–14)
BASOPHILS # BLD AUTO: 0.07 K/UL — SIGNIFICANT CHANGE UP (ref 0–0.2)
BASOPHILS NFR BLD AUTO: 0.2 % — SIGNIFICANT CHANGE UP (ref 0–1)
BUN SERPL-MCNC: 38 MG/DL — HIGH (ref 10–20)
BUN SERPL-MCNC: 43 MG/DL — HIGH (ref 10–20)
CALCIUM SERPL-MCNC: 8.5 MG/DL — SIGNIFICANT CHANGE UP (ref 8.4–10.5)
CALCIUM SERPL-MCNC: 8.7 MG/DL — SIGNIFICANT CHANGE UP (ref 8.4–10.5)
CHLORIDE SERPL-SCNC: 101 MMOL/L — SIGNIFICANT CHANGE UP (ref 98–110)
CHLORIDE SERPL-SCNC: 99 MMOL/L — SIGNIFICANT CHANGE UP (ref 98–110)
CO2 SERPL-SCNC: 21 MMOL/L — SIGNIFICANT CHANGE UP (ref 17–32)
CO2 SERPL-SCNC: 26 MMOL/L — SIGNIFICANT CHANGE UP (ref 17–32)
CREAT SERPL-MCNC: 0.6 MG/DL — LOW (ref 0.7–1.5)
CREAT SERPL-MCNC: 0.7 MG/DL — SIGNIFICANT CHANGE UP (ref 0.7–1.5)
EGFR: 84 ML/MIN/1.73M2 — SIGNIFICANT CHANGE UP
EGFR: 87 ML/MIN/1.73M2 — SIGNIFICANT CHANGE UP
EOSINOPHIL # BLD AUTO: 0.21 K/UL — SIGNIFICANT CHANGE UP (ref 0–0.7)
EOSINOPHIL NFR BLD AUTO: 0.7 % — SIGNIFICANT CHANGE UP (ref 0–8)
GLUCOSE BLDC GLUCOMTR-MCNC: 169 MG/DL — HIGH (ref 70–99)
GLUCOSE BLDC GLUCOMTR-MCNC: 233 MG/DL — HIGH (ref 70–99)
GLUCOSE BLDC GLUCOMTR-MCNC: 234 MG/DL — HIGH (ref 70–99)
GLUCOSE BLDC GLUCOMTR-MCNC: 259 MG/DL — HIGH (ref 70–99)
GLUCOSE SERPL-MCNC: 159 MG/DL — HIGH (ref 70–99)
GLUCOSE SERPL-MCNC: 251 MG/DL — HIGH (ref 70–99)
HCT VFR BLD CALC: 33.3 % — LOW (ref 37–47)
HGB BLD-MCNC: 10.4 G/DL — LOW (ref 12–16)
IMM GRANULOCYTES NFR BLD AUTO: 0.1 % — SIGNIFICANT CHANGE UP (ref 0.1–0.3)
LYMPHOCYTES # BLD AUTO: 24.04 K/UL — HIGH (ref 1.2–3.4)
LYMPHOCYTES # BLD AUTO: 85.6 % — HIGH (ref 20.5–51.1)
MAGNESIUM SERPL-MCNC: 1.8 MG/DL — SIGNIFICANT CHANGE UP (ref 1.8–2.4)
MAGNESIUM SERPL-MCNC: 1.8 MG/DL — SIGNIFICANT CHANGE UP (ref 1.8–2.4)
MCHC RBC-ENTMCNC: 27.3 PG — SIGNIFICANT CHANGE UP (ref 27–31)
MCHC RBC-ENTMCNC: 31.2 G/DL — LOW (ref 32–37)
MCV RBC AUTO: 87.4 FL — SIGNIFICANT CHANGE UP (ref 81–99)
MONOCYTES # BLD AUTO: 0.87 K/UL — HIGH (ref 0.1–0.6)
MONOCYTES NFR BLD AUTO: 3.1 % — SIGNIFICANT CHANGE UP (ref 1.7–9.3)
NEUTROPHILS # BLD AUTO: 2.84 K/UL — SIGNIFICANT CHANGE UP (ref 1.4–6.5)
NEUTROPHILS NFR BLD AUTO: 10.3 % — LOW (ref 42.2–75.2)
NRBC # BLD: 0 /100 WBCS — SIGNIFICANT CHANGE UP (ref 0–0)
PLATELET # BLD AUTO: 260 K/UL — SIGNIFICANT CHANGE UP (ref 130–400)
PMV BLD: 9.7 FL — SIGNIFICANT CHANGE UP (ref 7.4–10.4)
POTASSIUM SERPL-MCNC: 4.2 MMOL/L — SIGNIFICANT CHANGE UP (ref 3.5–5)
POTASSIUM SERPL-MCNC: 5 MMOL/L — SIGNIFICANT CHANGE UP (ref 3.5–5)
POTASSIUM SERPL-SCNC: 4.2 MMOL/L — SIGNIFICANT CHANGE UP (ref 3.5–5)
POTASSIUM SERPL-SCNC: 5 MMOL/L — SIGNIFICANT CHANGE UP (ref 3.5–5)
RBC # BLD: 3.81 M/UL — LOW (ref 4.2–5.4)
RBC # FLD: 18 % — HIGH (ref 11.5–14.5)
SODIUM SERPL-SCNC: 132 MMOL/L — LOW (ref 135–146)
SODIUM SERPL-SCNC: 135 MMOL/L — SIGNIFICANT CHANGE UP (ref 135–146)
WBC # BLD: 28.07 K/UL — HIGH (ref 4.8–10.8)
WBC # FLD AUTO: 28.07 K/UL — HIGH (ref 4.8–10.8)

## 2024-09-19 PROCEDURE — 93880 EXTRACRANIAL BILAT STUDY: CPT | Mod: 26

## 2024-09-19 PROCEDURE — 75574 CT ANGIO HRT W/3D IMAGE: CPT | Mod: 26

## 2024-09-19 PROCEDURE — 99232 SBSQ HOSP IP/OBS MODERATE 35: CPT

## 2024-09-19 PROCEDURE — 74174 CTA ABD&PLVS W/CONTRAST: CPT | Mod: 26

## 2024-09-19 RX ORDER — FUROSEMIDE 10 MG/ML
40 INJECTION INTRAVENOUS DAILY
Refills: 0 | Status: DISCONTINUED | OUTPATIENT
Start: 2024-09-20 | End: 2024-09-21

## 2024-09-19 RX ORDER — MAGNESIUM SULFATE 500 MG/ML
2 VIAL (ML) INJECTION ONCE
Refills: 0 | Status: COMPLETED | OUTPATIENT
Start: 2024-09-19 | End: 2024-09-19

## 2024-09-19 RX ADMIN — Medication 3.12 MILLIGRAM(S): at 05:20

## 2024-09-19 RX ADMIN — LOSARTAN POTASSIUM 50 MILLIGRAM(S): 100 TABLET, FILM COATED ORAL at 17:15

## 2024-09-19 RX ADMIN — FUROSEMIDE 40 MILLIGRAM(S): 10 INJECTION INTRAVENOUS at 05:20

## 2024-09-19 RX ADMIN — FUROSEMIDE 40 MILLIGRAM(S): 10 INJECTION INTRAVENOUS at 13:29

## 2024-09-19 RX ADMIN — ATORVASTATIN CALCIUM 20 MILLIGRAM(S): 10 TABLET, FILM COATED ORAL at 21:14

## 2024-09-19 RX ADMIN — Medication 3: at 17:14

## 2024-09-19 RX ADMIN — Medication 81 MILLIGRAM(S): at 11:45

## 2024-09-19 RX ADMIN — GABAPENTIN 100 MILLIGRAM(S): 800 TABLET, FILM COATED ORAL at 13:28

## 2024-09-19 RX ADMIN — Medication 17 GRAM(S): at 05:20

## 2024-09-19 RX ADMIN — Medication 650 MILLIGRAM(S): at 14:00

## 2024-09-19 RX ADMIN — GABAPENTIN 100 MILLIGRAM(S): 800 TABLET, FILM COATED ORAL at 05:20

## 2024-09-19 RX ADMIN — LOSARTAN POTASSIUM 50 MILLIGRAM(S): 100 TABLET, FILM COATED ORAL at 05:20

## 2024-09-19 RX ADMIN — GABAPENTIN 100 MILLIGRAM(S): 800 TABLET, FILM COATED ORAL at 21:14

## 2024-09-19 RX ADMIN — INSULIN GLARGINE 10 UNIT(S): 300 INJECTION, SOLUTION SUBCUTANEOUS at 21:14

## 2024-09-19 RX ADMIN — Medication 650 MILLIGRAM(S): at 13:29

## 2024-09-19 RX ADMIN — Medication 650 MILLIGRAM(S): at 20:59

## 2024-09-19 RX ADMIN — ENOXAPARIN SODIUM 40 MILLIGRAM(S): 150 INJECTION SUBCUTANEOUS at 17:14

## 2024-09-19 RX ADMIN — Medication 650 MILLIGRAM(S): at 19:58

## 2024-09-19 RX ADMIN — Medication 25 GRAM(S): at 23:45

## 2024-09-19 RX ADMIN — Medication 3.12 MILLIGRAM(S): at 17:15

## 2024-09-19 RX ADMIN — Medication 650 MILLIGRAM(S): at 05:22

## 2024-09-19 RX ADMIN — Medication 2: at 11:45

## 2024-09-19 RX ADMIN — CHLORHEXIDINE GLUCONATE ORAL RINSE 1 APPLICATION(S): 1.2 SOLUTION DENTAL at 11:46

## 2024-09-19 NOTE — CONSULT NOTE ADULT - CONSULT REASON
Patient requires dental clearance for TAVR
pressure injury assessment and treatment recommendation
Fluid overload; Severe AS; LHC one week ago

## 2024-09-19 NOTE — PROGRESS NOTE ADULT - ASSESSMENT
Assessment:  88F pmhx DM, HTN, aortic stenosis, DM, CAD, D-CHF presents for HFpEF exacerbation 2/2 AS    Problems discussed and associated plan:  #acute hypoxic resp failure   # HFpEF exaccerbation  - lasix iv 40 bid  - Strict I&O's   - Daily weights   - Pro-BNP: 76765  9/16   - Follow up AM labs     # severe AS  - lasix iv 40 bid  - now on 2L NC --> ween O2 as tolerated  - TAVR evaluation/w/u    # NSTEMI II Demand ischemia  - troponin 68-->74-->60  - c/w ASA 81mg     # LFTs  - likely 2/2 overload  - RUQ if not improving    # mild chronic anemia  - f/u iron labs     # HTN  - c/w losartan 50 BID   - c/w carvedilol 3.125 Bid    # HLD  - c/w atorvastatin 20 QD    #DMII   - ISS   - Hold home acarbose, janumet, jardiance     #constipation   - miralax bid  - senna 2 qHS    #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 Perez.    #Misc  DVT prophylaxis: Enoxaparin 40mg QD   GI prophylaxis:   Please contact me with any questions or concerns at [phone].   Assessment:  88F pmhx DM, HTN, aortic stenosis, DM, CAD, D-CHF presents for HFpEF exacerbation 2/2 AS    Problems discussed and associated plan:  #acute hypoxic resp failure   # HFpEF exaccerbation  - lasix iv 40 bid  - Strict I&O's   - Daily weights   - Pro-BNP: 52074  9/16   - BMP BID  - K >4 and mg >2 needs to be replated     # severe AS  - lasix iv 40 bid  - now on 2L NC --> ween O2 as tolerated  - TAVR evaluation/w/u    # NSTEMI II Demand ischemia  - troponin 68-->74-->60  - c/w ASA 81mg     # LFTs  - likely 2/2 overload  - RUQ if not improving    # mild chronic anemia  - f/u iron labs     # HTN  - c/w losartan 50 BID   - c/w carvedilol 3.125 Bid    # HLD  - c/w atorvastatin 20 QD    #DMII   - ISS   - Hold home acarbose, janumet, jardiance     #constipation   - miralax bid  - senna 2 qHS    #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 Perez.    [facility]  DVT prophylaxis: Enoxaparin 40mg QD   GI prophylaxis:   Please contact me with any questions or concerns at [phone].

## 2024-09-19 NOTE — PROGRESS NOTE ADULT - SUBJECTIVE AND OBJECTIVE BOX
Chief complaint: Patient is a 88y old  Female who presents with a chief complaint of shortness of breath (17 Sep 2024 14:05)    Hospital Course: 88F pmhx DM, HTN, aortic stenosis, DM, CAD, D-CHF presents for shortness of breath overnight. Patient was hospitalized last week with dyspnea and chest pain, underwent stress test which was positive. Patient then had left heart cath which identified single vessel disease and severe aortic stenosis. Daughter states that TAVR was deferred as patient is at increased risk due to her age. Patient was supposed to follow up with Dr. España in a few days outpatient. Patient was on lasix during last admission but was not discharged home with it. Patient denies chest pain. She states that shortness of breath feels better after initial dose of lasix and oxygen administration. Patient also with increased swelling of lower extremities     Interval history: Pt was seen and examined at bedside in NAD. Pt denies SOB, CP, Palpitations.     Review of systems: A complete 10-point review of systems was obtained and is negative except as stated in the interval history.    Vitals:  T(F): 98.4, Max: 98.5 ( @ 20:44)  HR: 73 (70 - 77)  BP: 128/62 (128/62 - 150/81)  RR: 18 (18 - 18)  SpO2: 96% (96% - 99%)    Ins & outs:      @ :  -   @ 07:00  --------------------------------------------------------  IN: 0 mL / OUT: 200 mL / NET: -200 mL     @ 07:  -   @ 02:05  --------------------------------------------------------  IN: 0 mL / OUT: 1050 mL / NET: -1050 mL      Weight trend:  Weight (kg): 49.3 ()    Physical exam:  General: No apparent distress  HEENT: Anicteric sclera. Moist mucous membranes.   Cardiac: Regular rate and rhythm. No murmurs, rubs, or gallops.   Vascular: Symmetric radial pulses. Dorsalis pedis pulses palpable.   Respiratory: Normal effort. Clear to ascultation.   Abdomen: Soft, nontender. Audible bowel sounds.   Extremities: Warm with mild b/L LE edema. No cyanosis or clubbing.   Skin: Warm and dry. No rash.   Neurologic: Grossly normal motor function.   Psychiatric: Oriented to person, place, and time.     Data reviewed:  - Telemetry:   - EC/4/24: Diagnosis Line Sinus bradycardia. Left anterior fascicular block. Anteroseptal infarct , age undetermined. T wave abnormality, consider inferolateral ischemia. with 59bpm   - TTE:   24:   1. Left ventricular ejection fraction, by visual estimation, is 50 to   55%.   2. Mildly decreased segmental left ventricular systolic function.   3. Mid and apical anterior septum, apical inferior segment, and apex are   abnormal as described above.   4. Mild left ventricular hypertrophy.   5. Normal right ventricular size and function.   6. Mild to moderately enlarged left atrium.   7. Mild mitral annular calcification.   8. Mild to moderate mitral valve regurgitation.   9. Moderate tricuspid regurgitation.  10. Mild aortic regurgitation.  11. Moderate to severe aortic valve stenosis.  12. Mild pulmonic valve regurgitation.  13. There is mild aortic root calcification.  - Chest x-ray   24:   Stable bilateral lung opacities  Prominence to the heart and mediastinum.  Further evaluation with a PA view the chest is recommended  - Stress test:   24:   1.   Small to moderate size severe reversible defect in the inferolateral   wall and small reversible defect in the anteroseptal wall of the left   ventricle consistent with ischemia and suggesting ischemia in multiple   coronary territories.  2.  Dilated left ventricle with mild global hypokinesis. All walls of the   left ventricle thicken.  3.  Left ventricular ejection fraction calculated as 46% which is low.   Wall motion analysis suggests ejection fraction of 40-45%.   Echocardiographic ejection fraction by visual estimation 50-55% on   2024  - CCTA:  - Cardiac catheterization:   24:   POST-OP DIAGNOSIS:    Severe single vessel disease  Severe AS    Coronary Dominance: Right    LM: short segment engaged    LAD: Medium Sized Vessel  pLAD: 85% lesion at distal third   mLAD: moderate atherosclerotic disease  dLAD: mild atherosclerotic disease  D1: severe ostial stenosis, small vessel  D2: minor luminal irregularities    CX: medium sized vessel  pLcx: minor luminal irregularities  dLcx: tortuous vessel with minor luminal irregularities  OM1: Large tortuous vessel, no significant disease    RCA: Medium sized, dominant vessel  pRCA: mild atherosclerotic disease  mRCA: mild atherosclerotic disease  dRCA: minor luminal irregularities  RPDA: no significant disease  RPL: no significant disease    LVEDP: 15 mmHg     EF: 50 % by Echo    PA % sat: 75.2%  SaO2 % sat: 98.5%    RA pressure: 9 mmHg  RV pressure: 40/13 mmHg  PA pressure: 44/17/25 mmHg  PCWP: 23/17/14 mmHg    CVP: 9 mmHg  CO/CI Jc: 3.24 L/min - 2.16 L/min/m2  PVR (woods units): 2.47 Woods units  SVR (Dynes.s/cm5):   1728 dynes.s/cm5    AV gradient: 40 mmHg (peak to peak gradient)  ALEX: 0.51 cm2 (by Hakki equation)      - Cardiac MRI:    - Labs:                        11.3   31.21 )-----------( 249      ( 17 Sep 2024 06:18 )             34.8         140  |  104  |  34[H]  ----------------------------<  108[H]  4.1   |  27  |  0.5[L]    Ca    8.7      17 Sep 2024 06:18  Mg     2.0         TPro  6.2  /  Alb  3.2[L]  /  TBili  0.4  /  DBili  x   /  AST  106[H]  /  ALT  151[H]  /  AlkPhos  188[H]      Urinalysis Basic - ( 17 Sep 2024 06:18 )    Color: x / Appearance: x / SG: x / pH: x  Gluc: 108 mg/dL / Ketone: x  / Bili: x / Urobili: x   Blood: x / Protein: x / Nitrite: x   Leuk Esterase: x / RBC: x / WBC x   Sq Epi: x / Non Sq Epi: x / Bacteria: x        Medications:  aspirin enteric coated 81 milliGRAM(s) Oral daily  atorvastatin 20 milliGRAM(s) Oral at bedtime  carvedilol 3.125 milliGRAM(s) Oral every 12 hours  chlorhexidine 2% Cloths 1 Application(s) Topical daily  dextrose 50% Injectable 12.5 Gram(s) IV Push once  dextrose 50% Injectable 25 Gram(s) IV Push once  dextrose 50% Injectable 25 Gram(s) IV Push once  enoxaparin Injectable 40 milliGRAM(s) SubCutaneous every 24 hours  furosemide   Injectable 40 milliGRAM(s) IV Push two times a day  gabapentin 100 milliGRAM(s) Oral every 8 hours  glucagon  Injectable 1 milliGRAM(s) IntraMuscular once  insulin glargine Injectable (LANTUS) 12 Unit(s) SubCutaneous at bedtime  insulin lispro (ADMELOG) corrective regimen sliding scale   SubCutaneous three times a day before meals  insulin lispro Injectable (ADMELOG) 3 Unit(s) SubCutaneous three times a day before meals  losartan 50 milliGRAM(s) Oral every 12 hours  polyethylene glycol 3350 17 Gram(s) Oral two times a day  senna 2 Tablet(s) Oral at bedtime    Drips:  dextrose 5%. 1000 milliLiter(s) (50 mL/Hr) IV Continuous <Continuous>  dextrose 5%. 1000 milliLiter(s) (100 mL/Hr) IV Continuous <Continuous>    PRN:     Allergies    No Known Drug Allergies  Alcohol (Hives; Angioedema)    Intolerances       Chief complaint: Patient is a 88y old  Female who presents with a chief complaint of shortness of breath (17 Sep 2024 14:05)    Hospital Course: 88F pmhx DM, HTN, aortic stenosis, DM, CAD, D-CHF presents for shortness of breath overnight. Patient was hospitalized last week with dyspnea and chest pain, underwent stress test which was positive. Patient then had left heart cath which identified single vessel disease and severe aortic stenosis. Daughter states that TAVR was deferred as patient is at increased risk due to her age. Patient was supposed to follow up with Dr. España in a few days outpatient. Patient was on lasix during last admission but was not discharged home with it. Patient denies chest pain. She states that shortness of breath feels better after initial dose of lasix and oxygen administration. Patient also with increased swelling of lower extremities     Interval history: Pt was seen and examined at bedside in NAD.     Review of systems: A complete 10-point review of systems was obtained and is negative except as stated in the interval history.    Vitals:  Vital Signs Last 24 Hrs  T(C): 36.2 (19 Sep 2024 07:10), Max: 37.2 (18 Sep 2024 15:25)  T(F): 97.1 (19 Sep 2024 07:10), Max: 99 (18 Sep 2024 15:25)  HR: 70 (19 Sep 2024 07:10) (64 - 70)  BP: 134/61 (19 Sep 2024 07:10) (134/61 - 150/65)  BP(mean): 88 (19 Sep 2024 07:10) (88 - 93)  RR: 20 (19 Sep 2024 04:51) (19 - 20)  SpO2: 98% (19 Sep 2024 04:51) (96% - 98%)    Parameters below as of 19 Sep 2024 04:51  Patient On (Oxygen Delivery Method): room air      Ins & outs:      @ 07:  -   @ 07:00  --------------------------------------------------------  IN: 0 mL / OUT: 200 mL / NET: -200 mL     @ 07:  -   @ 02:05  --------------------------------------------------------  IN: 0 mL / OUT: 1050 mL / NET: -1050 mL      18 Sep 2024 07:01  -  19 Sep 2024 07:00  --------------------------------------------------------  IN: 788 mL / OUT: 1950 mL / NET: -1162 mL        Weight trend:  Weight (kg): 49.3 ()    Physical exam:  General: No apparent distress  HEENT: Anicteric sclera. Moist mucous membranes.   Cardiac: Regular rate and rhythm. No murmurs, rubs, or gallops.   Vascular: Symmetric radial pulses. Dorsalis pedis pulses palpable.   Respiratory: Normal effort. Clear to ascultation.   Abdomen: Soft, nontender. Audible bowel sounds.   Extremities: Warm with mild b/L LE edema. No cyanosis or clubbing.   Skin: Warm and dry. No rash.   Neurologic: Grossly normal motor function.   Psychiatric: Oriented to person, place, and time.     Data reviewed:  - Telemetry: NSR 70s bpm  - EC/4/24: Diagnosis Line Sinus bradycardia. Left anterior fascicular block. Anteroseptal infarct , age undetermined. T wave abnormality, consider inferolateral ischemia. with 59bpm   - TTE:   24:   1. Left ventricular ejection fraction, by visual estimation, is 50 to   55%.   2. Mildly decreased segmental left ventricular systolic function.   3. Mid and apical anterior septum, apical inferior segment, and apex are   abnormal as described above.   4. Mild left ventricular hypertrophy.   5. Normal right ventricular size and function.   6. Mild to moderately enlarged left atrium.   7. Mild mitral annular calcification.   8. Mild to moderate mitral valve regurgitation.   9. Moderate tricuspid regurgitation.  10. Mild aortic regurgitation.  11. Moderate to severe aortic valve stenosis.  12. Mild pulmonic valve regurgitation.  13. There is mild aortic root calcification.  - Chest x-ray   24:   Stable bilateral lung opacities  Prominence to the heart and mediastinum.  Further evaluation with a PA view the chest is recommended  - Stress test:   24:   1.   Small to moderate size severe reversible defect in the inferolateral   wall and small reversible defect in the anteroseptal wall of the left   ventricle consistent with ischemia and suggesting ischemia in multiple   coronary territories.  2.  Dilated left ventricle with mild global hypokinesis. All walls of the   left ventricle thicken.  3.  Left ventricular ejection fraction calculated as 46% which is low.   Wall motion analysis suggests ejection fraction of 40-45%.   Echocardiographic ejection fraction by visual estimation 50-55% on   2024  - CCTA:  - Cardiac catheterization:   24:   POST-OP DIAGNOSIS:    Severe single vessel disease  Severe AS    Coronary Dominance: Right    LM: short segment engaged    LAD: Medium Sized Vessel  pLAD: 85% lesion at distal third   mLAD: moderate atherosclerotic disease  dLAD: mild atherosclerotic disease  D1: severe ostial stenosis, small vessel  D2: minor luminal irregularities    CX: medium sized vessel  pLcx: minor luminal irregularities  dLcx: tortuous vessel with minor luminal irregularities  OM1: Large tortuous vessel, no significant disease    RCA: Medium sized, dominant vessel  pRCA: mild atherosclerotic disease  mRCA: mild atherosclerotic disease  dRCA: minor luminal irregularities  RPDA: no significant disease  RPL: no significant disease    LVEDP: 15 mmHg     EF: 50 % by Echo    PA % sat: 75.2%  SaO2 % sat: 98.5%    RA pressure: 9 mmHg  RV pressure: 40/13 mmHg  PA pressure: 44/17/25 mmHg  PCWP: 23/17/14 mmHg    CVP: 9 mmHg  CO/CI Jc: 3.24 L/min - 2.16 L/min/m2  PVR (woods units): 2.47 Woods units  SVR (Dynes.s/cm5):   1728 dynes.s/cm5    AV gradient: 40 mmHg (peak to peak gradient)  ALEX: 0.51 cm2 (by Hakki equation)      - Cardiac MRI:    - Labs:                        11.3   31.21 )-----------( 249      ( 17 Sep 2024 06:18 )             34.8         140  |  104  |  34[H]  ----------------------------<  108[H]  4.1   |  27  |  0.5[L]    Ca    8.7      17 Sep 2024 06:18  Mg     2.0         TPro  6.2  /  Alb  3.2[L]  /  TBili  0.4  /  DBili  x   /  AST  106[H]  /  ALT  151[H]  /  AlkPhos  188[H]      Urinalysis Basic - ( 17 Sep 2024 06:18 )    Color: x / Appearance: x / SG: x / pH: x  Gluc: 108 mg/dL / Ketone: x  / Bili: x / Urobili: x   Blood: x / Protein: x / Nitrite: x   Leuk Esterase: x / RBC: x / WBC x   Sq Epi: x / Non Sq Epi: x / Bacteria: x        Medications:  aspirin enteric coated 81 milliGRAM(s) Oral daily  atorvastatin 20 milliGRAM(s) Oral at bedtime  carvedilol 3.125 milliGRAM(s) Oral every 12 hours  chlorhexidine 2% Cloths 1 Application(s) Topical daily  dextrose 50% Injectable 12.5 Gram(s) IV Push once  dextrose 50% Injectable 25 Gram(s) IV Push once  dextrose 50% Injectable 25 Gram(s) IV Push once  enoxaparin Injectable 40 milliGRAM(s) SubCutaneous every 24 hours  furosemide   Injectable 40 milliGRAM(s) IV Push two times a day  gabapentin 100 milliGRAM(s) Oral every 8 hours  glucagon  Injectable 1 milliGRAM(s) IntraMuscular once  insulin glargine Injectable (LANTUS) 12 Unit(s) SubCutaneous at bedtime  insulin lispro (ADMELOG) corrective regimen sliding scale   SubCutaneous three times a day before meals  insulin lispro Injectable (ADMELOG) 3 Unit(s) SubCutaneous three times a day before meals  losartan 50 milliGRAM(s) Oral every 12 hours  polyethylene glycol 3350 17 Gram(s) Oral two times a day  senna 2 Tablet(s) Oral at bedtime    Drips:  dextrose 5%. 1000 milliLiter(s) (50 mL/Hr) IV Continuous <Continuous>  dextrose 5%. 1000 milliLiter(s) (100 mL/Hr) IV Continuous <Continuous>    PRN:     Allergies    No Known Drug Allergies  Alcohol (Hives; Angioedema)    Intolerances       Chief complaint: Patient is a 88y old  Female who presents with a chief complaint of shortness of breath (17 Sep 2024 14:05)    Hospital Course: 88F pmhx DM, HTN, aortic stenosis, DM, CAD, D-CHF presents for shortness of breath overnight. Patient was hospitalized last week with dyspnea and chest pain, underwent stress test which was positive. Patient then had left heart cath which identified single vessel disease and severe aortic stenosis. Daughter states that TAVR was deferred as patient is at increased risk due to her age. Patient was supposed to follow up with Dr. España in a few days outpatient. Patient was on lasix during last admission but was not discharged home with it. Patient denies chest pain. She states that shortness of breath feels better after initial dose of lasix and oxygen administration. Patient also with increased swelling of lower extremities     Interval history: Pt was seen and examined at bedside in NAD. No overnight events    Review of systems: A complete 10-point review of systems was obtained and is negative except as stated in the interval history.    Vitals:  Vital Signs Last 24 Hrs  T(C): 36.2 (19 Sep 2024 07:10), Max: 37.2 (18 Sep 2024 15:25)  T(F): 97.1 (19 Sep 2024 07:10), Max: 99 (18 Sep 2024 15:25)  HR: 70 (19 Sep 2024 07:10) (64 - 70)  BP: 134/61 (19 Sep 2024 07:10) (134/61 - 150/65)  BP(mean): 88 (19 Sep 2024 07:10) (88 - 93)  RR: 20 (19 Sep 2024 04:51) (19 - 20)  SpO2: 98% (19 Sep 2024 04:51) (96% - 98%)    Parameters below as of 19 Sep 2024 04:51  Patient On (Oxygen Delivery Method): room air      Ins & outs:      @ 07:  -   @ 07:00  --------------------------------------------------------  IN: 0 mL / OUT: 200 mL / NET: -200 mL     @ 07: @ 02:05  --------------------------------------------------------  IN: 0 mL / OUT: 1050 mL / NET: -1050 mL      18 Sep 2024 07:01  -  19 Sep 2024 07:00  --------------------------------------------------------  IN: 788 mL / OUT: 1950 mL / NET: -1162 mL        Weight trend:  Weight (kg): 49.3 ()    Physical exam:  General: No apparent distress  HEENT: Anicteric sclera. Moist mucous membranes.   Cardiac: Regular rate and rhythm. No murmurs, rubs, or gallops.   Vascular: Symmetric radial pulses. Dorsalis pedis pulses palpable.   Respiratory: Normal effort. Clear to ascultation.   Abdomen: Soft, nontender. Audible bowel sounds.   Extremities: Warm with mild b/L LE edema. No cyanosis or clubbing.   Skin: Warm and dry. No rash.   Neurologic: Grossly normal motor function.   Psychiatric: Oriented to person, place, and time       Data reviewed:  - Telemetry: NSR 70s bpm  - EC/4/24: Diagnosis Line Sinus bradycardia. Left anterior fascicular block. Anteroseptal infarct , age undetermined. T wave abnormality, consider inferolateral ischemia. with 59bpm   - TTE:   24:   1. Left ventricular ejection fraction, by visual estimation, is 50 to   55%.   2. Mildly decreased segmental left ventricular systolic function.   3. Mid and apical anterior septum, apical inferior segment, and apex are   abnormal as described above.   4. Mild left ventricular hypertrophy.   5. Normal right ventricular size and function.   6. Mild to moderately enlarged left atrium.   7. Mild mitral annular calcification.   8. Mild to moderate mitral valve regurgitation.   9. Moderate tricuspid regurgitation.  10. Mild aortic regurgitation.  11. Moderate to severe aortic valve stenosis.  12. Mild pulmonic valve regurgitation.  13. There is mild aortic root calcification.  - Chest x-ray   9/16/24:   Stable bilateral lung opacities  Prominence to the heart and mediastinum.  Further evaluation with a PA view the chest is recommended  - Stress test:   24:   1.   Small to moderate size severe reversible defect in the inferolateral   wall and small reversible defect in the anteroseptal wall of the left   ventricle consistent with ischemia and suggesting ischemia in multiple   coronary territories.  2.  Dilated left ventricle with mild global hypokinesis. All walls of the   left ventricle thicken.  3.  Left ventricular ejection fraction calculated as 46% which is low.   Wall motion analysis suggests ejection fraction of 40-45%.   Echocardiographic ejection fraction by visual estimation 50-55% on   2024  - CCTA:  - Cardiac catheterization:   24:   POST-OP DIAGNOSIS:    Severe single vessel disease  Severe AS    Coronary Dominance: Right    LM: short segment engaged    LAD: Medium Sized Vessel  pLAD: 85% lesion at distal third   mLAD: moderate atherosclerotic disease  dLAD: mild atherosclerotic disease  D1: severe ostial stenosis, small vessel  D2: minor luminal irregularities    CX: medium sized vessel  pLcx: minor luminal irregularities  dLcx: tortuous vessel with minor luminal irregularities  OM1: Large tortuous vessel, no significant disease    RCA: Medium sized, dominant vessel  pRCA: mild atherosclerotic disease  mRCA: mild atherosclerotic disease  dRCA: minor luminal irregularities  RPDA: no significant disease  RPL: no significant disease    LVEDP: 15 mmHg     EF: 50 % by Echo    PA % sat: 75.2%  SaO2 % sat: 98.5%    RA pressure: 9 mmHg  RV pressure: 40/13 mmHg  PA pressure: 44/17/25 mmHg  PCWP: 23/17/14 mmHg    CVP: 9 mmHg  CO/CI Jc: 3.24 L/min - 2.16 L/min/m2  PVR (woods units): 2.47 Woods units  SVR (Dynes.s/cm5):   1728 dynes.s/cm5    AV gradient: 40 mmHg (peak to peak gradient)  ALEX: 0.51 cm2 (by Hakki equation)        - Labs:                        10.4   28.07 )-----------( 260      ( 19 Sep 2024 06:02 )             33.3     09-19    135  |  101  |  38[H]  ----------------------------<  159[H]  4.2   |  26  |  0.6[L]    Ca    8.5      19 Sep 2024 06:02  Mg     1.8         TPro  6.2  /  Alb  3.4[L]  /  TBili  0.5  /  DBili  <0.2  /  AST  90[H]  /  ALT  154[H]  /  AlkPhos  173[H]      LIVER FUNCTIONS - ( 18 Sep 2024 06:02 )  Alb: 3.4 g/dL / Pro: 6.2 g/dL / ALK PHOS: 173 U/L / ALT: 154 U/L / AST: 90 U/L / GGT: x           PT/INR - ( 18 Sep 2024 06:02 )   PT: 10.90 sec;   INR: 0.96 ratio         PTT - ( 18 Sep 2024 06:02 )  PTT:30.1 sec          Lactate Trend    Urinalysis Basic - ( 19 Sep 2024 06:02 )    Color: x / Appearance: x / SG: x / pH: x  Gluc: 159 mg/dL / Ketone: x  / Bili: x / Urobili: x   Blood: x / Protein: x / Nitrite: x   Leuk Esterase: x / RBC: x / WBC x   Sq Epi: x / Non Sq Epi: x / Bacteria: x                  Medications:  aspirin enteric coated 81 milliGRAM(s) Oral daily  atorvastatin 20 milliGRAM(s) Oral at bedtime  carvedilol 3.125 milliGRAM(s) Oral every 12 hours  chlorhexidine 2% Cloths 1 Application(s) Topical daily  dextrose 50% Injectable 12.5 Gram(s) IV Push once  dextrose 50% Injectable 25 Gram(s) IV Push once  dextrose 50% Injectable 25 Gram(s) IV Push once  enoxaparin Injectable 40 milliGRAM(s) SubCutaneous every 24 hours  furosemide   Injectable 40 milliGRAM(s) IV Push two times a day  gabapentin 100 milliGRAM(s) Oral every 8 hours  glucagon  Injectable 1 milliGRAM(s) IntraMuscular once  insulin glargine Injectable (LANTUS) 12 Unit(s) SubCutaneous at bedtime  insulin lispro (ADMELOG) corrective regimen sliding scale   SubCutaneous three times a day before meals  insulin lispro Injectable (ADMELOG) 3 Unit(s) SubCutaneous three times a day before meals  losartan 50 milliGRAM(s) Oral every 12 hours  polyethylene glycol 3350 17 Gram(s) Oral two times a day  senna 2 Tablet(s) Oral at bedtime    Drips:  dextrose 5%. 1000 milliLiter(s) (50 mL/Hr) IV Continuous <Continuous>  dextrose 5%. 1000 milliLiter(s) (100 mL/Hr) IV Continuous <Continuous>    PRN:     Allergies    No Known Drug Allergies  Alcohol (Hives; Angioedema)    Intolerances

## 2024-09-19 NOTE — CONSULT NOTE ADULT - ASSESSMENT
Cardiology HPI:  88F pmhx DM, HTN, aortic stenosis, DM, CAD, D-CHF presents for shortness of breath overnight. Patient was hospitalized last week with dyspnea and chest pain, underwent stress test which was positive. Patient then had left heart cath which identified single vessel disease and severe aortic stenosis. Daughter states that TAVR was deferred as patient is at increased risk due to her age.  Patient seen and examined.  Cardiology consulted for fluid overload, severe AS, and recent LHC.  Patient [place] speaking- daughter Amie interprets- patient denies any chest pain, sob, or palpitations at this time.  B/L LE edema present.  Patient was going to follow up with Dr. España after last admission last week, but did not go to appt.      *Fluid overload  B/L LE swelling  Continue to diurese  Is & Os/ Daily weights  Continue BB    *Severe AS  s/p LHC last week- single vessel disease- structural cardiology f/u     GOC conversation
Patient is a 88y old  Female who presents for dental clearance for TAVR.      HPI:  88F pmhx DM, HTN, aortic stenosis, DM, CAD, D-CHF presents for shortness of breath overnight. Patient was hospitalized last week with dyspnea and chest pain, underwent stress test which was positive. Patient then had left heart cath which identified single vessel disease and severe aortic stenosis. Daughter states that TAVR was deferred as patient is at increased risk due to her age. Patient was supposed to follow up with Dr. España in a few days outpatient. Patient was on lasix during last admission but was not discharged home with it. Patient denies chest pain. She states that shortness of breath feels better after initial dose of lasix and oxygen administration. Patient also with increased swelling of lower extremities  (16 Sep 2024 04:11)      PAST MEDICAL & SURGICAL HISTORY:  HTN (hypertension)      Diabetes      Vertigo      Abnormal cardiac valve      Status post hip replacement      H/O cardiac catheterization        ( - ) heart valve replacement  ( - ) joint replacement  ( - ) pregnancy    MEDICATIONS  (STANDING):  aspirin enteric coated 81 milliGRAM(s) Oral daily  atorvastatin 20 milliGRAM(s) Oral at bedtime  carvedilol 3.125 milliGRAM(s) Oral every 12 hours  chlorhexidine 2% Cloths 1 Application(s) Topical daily  dextrose 5%. 1000 milliLiter(s) (100 mL/Hr) IV Continuous <Continuous>  dextrose 5%. 1000 milliLiter(s) (50 mL/Hr) IV Continuous <Continuous>  dextrose 50% Injectable 12.5 Gram(s) IV Push once  dextrose 50% Injectable 25 Gram(s) IV Push once  dextrose 50% Injectable 25 Gram(s) IV Push once  enoxaparin Injectable 40 milliGRAM(s) SubCutaneous every 24 hours  furosemide   Injectable 40 milliGRAM(s) IV Push two times a day  gabapentin 100 milliGRAM(s) Oral every 8 hours  glucagon  Injectable 1 milliGRAM(s) IntraMuscular once  insulin glargine Injectable (LANTUS) 10 Unit(s) SubCutaneous at bedtime  insulin lispro (ADMELOG) corrective regimen sliding scale   SubCutaneous three times a day before meals  losartan 50 milliGRAM(s) Oral every 12 hours  polyethylene glycol 3350 17 Gram(s) Oral two times a day  senna 2 Tablet(s) Oral at bedtime    MEDICATIONS  (PRN):  acetaminophen     Tablet .. 650 milliGRAM(s) Oral every 6 hours PRN Mild Pain (1 - 3)  dextrose Oral Gel 15 Gram(s) Oral once PRN Blood Glucose LESS THAN 70 milliGRAM(s)/deciliter      Allergies    No Known Drug Allergies  Alcohol (Hives; Angioedema)    Intolerances        FAMILY HISTORY:      *SOCIAL HISTORY: ( - ) Tobacco; ( - ) ETOH      Vital Signs Last 24 Hrs  T(C): 37 (19 Sep 2024 15:30), Max: 37 (19 Sep 2024 15:30)  T(F): 98.6 (19 Sep 2024 15:30), Max: 98.6 (19 Sep 2024 15:30)  HR: 68 (19 Sep 2024 15:30) (64 - 70)  BP: 150/63 (19 Sep 2024 15:30) (134/61 - 150/63)  BP(mean): 91 (19 Sep 2024 15:30) (88 - 92)  RR: 18 (19 Sep 2024 15:30) (18 - 20)  SpO2: 98% (19 Sep 2024 04:51) (98% - 98%)    Parameters below as of 19 Sep 2024 04:51  Patient On (Oxygen Delivery Method): room air        LABS:                        10.4   28.07 )-----------( 260      ( 19 Sep 2024 06:02 )             33.3     09-19    135  |  101  |  38[H]  ----------------------------<  159[H]  4.2   |  26  |  0.6[L]    Ca    8.5      19 Sep 2024 06:02  Mg     1.8     09-19    TPro  6.2  /  Alb  3.4[L]  /  TBili  0.5  /  DBili  <0.2  /  AST  90[H]  /  ALT  154[H]  /  AlkPhos  173[H]  09-18    WBC Count: 28.07 K/uL *H* [4.80 - 10.80] (09-19 @ 06:02)  Platelet Count - Automated: 260 K/uL [130 - 400] (09-19 @ 06:02)  WBC Count: 30.61 K/uL *H* [4.80 - 10.80] (09-18 @ 06:02)  Platelet Count - Automated: 272 K/uL [130 - 400] (09-18 @ 06:02)  INR: 0.96 ratio [0.65 - 1.30] (09-18 @ 06:02)  WBC Count: 31.21 K/uL *H* [4.80 - 10.80] (09-17 @ 06:18)  Platelet Count - Automated: 249 K/uL [130 - 400] (09-17 @ 06:18)    Urinalysis Basic - ( 19 Sep 2024 06:02 )    Color: x / Appearance: x / SG: x / pH: x  Gluc: 159 mg/dL / Ketone: x  / Bili: x / Urobili: x   Blood: x / Protein: x / Nitrite: x   Leuk Esterase: x / RBC: x / WBC x   Sq Epi: x / Non Sq Epi: x / Bacteria: x        PT/INR - ( 18 Sep 2024 06:02 )   PT: 10.90 sec;   INR: 0.96 ratio         PTT - ( 18 Sep 2024 06:02 )  PTT:30.1 sec    EOE:  TMJ ( - ) clicks                     ( - ) pops                     ( - ) crepitus             Mandible FROM             Facial bones and MOM grossly intact             ( - ) trismus             ( - ) lymphadenopathy             ( - ) swelling             ( - ) asymmetry             ( - ) palpation             ( - ) dyspnea             ( - ) dysphagia             ( - ) loss of consciousness    IOE:  edentulous with upper complete denture and lower implant retained overdenture           hard/soft palate:  ( - ) palatal torus, No pathology noted           tongue/FOM No pathology noted           labial/buccal mucosa No pathology noted           ( - ) percussion           ( - ) palpation           ( - ) swelling            ( - ) abscess           ( - ) sinus tract    Dentition present: << n  >>      *DENTAL RADIOGRAPHS: Pan    RADIOLOGY & ADDITIONAL STUDIES: none    *ASSESSMENT: fully edentulous patient      *PLAN: clear patient for TAVR    PROCEDURE: none    RECOMMENDATIONS:  1) Patient is cleared for TAVR from a dental perspective    Anabel Giles DDS; pager #[phone]
                           Skin assessed-  Sacrum , B/l buttock intact blanchable redness, no acute wounds or pressure injury noted at time of assessment                                                    B/L heel dry and intact                                                   High risk for pressure injury development or progression       Wound and skin care recs.   Continue   Pressure  injury  preventive  measures  Skin  and incontinence care   Assess wound and inform primary provider of any changes   Case discussed with primary Rn   Wound/ ostomy specialist  to f/u as needed     Offloading: [ x] Frequent position changes [ ] Devices/Equipment  Cleansing: [ ] Saline [x ] Soap/Water [ ] Other: ______  Topicals: [x ] Barrier Cream [ ] Antimicrobial [ ] Enzymatic Wound Debridement [] Moist  wound Healing   Dressings: [ ] Dry, sterile [ ] Allevyn  Foam [ ] Absorbant Pads [ ] Collagenase    Other Recs.   Per Primary team   Total time for bedside assessment  , review of medical records  and  discussion of plan of care with primary team greater than 35 min

## 2024-09-20 LAB
ALBUMIN SERPL ELPH-MCNC: 3.4 G/DL — LOW (ref 3.5–5.2)
ALP SERPL-CCNC: 166 U/L — HIGH (ref 30–115)
ALT FLD-CCNC: 98 U/L — HIGH (ref 0–41)
ANION GAP SERPL CALC-SCNC: 7 MMOL/L — SIGNIFICANT CHANGE UP (ref 7–14)
ANION GAP SERPL CALC-SCNC: 7 MMOL/L — SIGNIFICANT CHANGE UP (ref 7–14)
ANION GAP SERPL CALC-SCNC: 8 MMOL/L — SIGNIFICANT CHANGE UP (ref 7–14)
AST SERPL-CCNC: 39 U/L — SIGNIFICANT CHANGE UP (ref 0–41)
BASOPHILS # BLD AUTO: 0.09 K/UL — SIGNIFICANT CHANGE UP (ref 0–0.2)
BASOPHILS NFR BLD AUTO: 0.3 % — SIGNIFICANT CHANGE UP (ref 0–1)
BILIRUB SERPL-MCNC: 0.3 MG/DL — SIGNIFICANT CHANGE UP (ref 0.2–1.2)
BUN SERPL-MCNC: 38 MG/DL — HIGH (ref 10–20)
BUN SERPL-MCNC: 40 MG/DL — HIGH (ref 10–20)
BUN SERPL-MCNC: 43 MG/DL — HIGH (ref 10–20)
CALCIUM SERPL-MCNC: 8.5 MG/DL — SIGNIFICANT CHANGE UP (ref 8.4–10.5)
CALCIUM SERPL-MCNC: 8.6 MG/DL — SIGNIFICANT CHANGE UP (ref 8.4–10.4)
CALCIUM SERPL-MCNC: 8.8 MG/DL — SIGNIFICANT CHANGE UP (ref 8.4–10.5)
CHLORIDE SERPL-SCNC: 101 MMOL/L — SIGNIFICANT CHANGE UP (ref 98–110)
CHLORIDE SERPL-SCNC: 103 MMOL/L — SIGNIFICANT CHANGE UP (ref 98–110)
CHLORIDE SERPL-SCNC: 98 MMOL/L — SIGNIFICANT CHANGE UP (ref 98–110)
CO2 SERPL-SCNC: 25 MMOL/L — SIGNIFICANT CHANGE UP (ref 17–32)
CO2 SERPL-SCNC: 26 MMOL/L — SIGNIFICANT CHANGE UP (ref 17–32)
CO2 SERPL-SCNC: 26 MMOL/L — SIGNIFICANT CHANGE UP (ref 17–32)
CREAT SERPL-MCNC: 0.5 MG/DL — LOW (ref 0.7–1.5)
CREAT SERPL-MCNC: 0.5 MG/DL — LOW (ref 0.7–1.5)
CREAT SERPL-MCNC: 1 MG/DL — SIGNIFICANT CHANGE UP (ref 0.7–1.5)
EGFR: 55 ML/MIN/1.73M2 — LOW
EGFR: 91 ML/MIN/1.73M2 — SIGNIFICANT CHANGE UP
EGFR: 91 ML/MIN/1.73M2 — SIGNIFICANT CHANGE UP
EOSINOPHIL # BLD AUTO: 0.22 K/UL — SIGNIFICANT CHANGE UP (ref 0–0.7)
EOSINOPHIL NFR BLD AUTO: 0.8 % — SIGNIFICANT CHANGE UP (ref 0–8)
GLUCOSE BLDC GLUCOMTR-MCNC: 167 MG/DL — HIGH (ref 70–99)
GLUCOSE BLDC GLUCOMTR-MCNC: 192 MG/DL — HIGH (ref 70–99)
GLUCOSE BLDC GLUCOMTR-MCNC: 300 MG/DL — HIGH (ref 70–99)
GLUCOSE BLDC GLUCOMTR-MCNC: 359 MG/DL — HIGH (ref 70–99)
GLUCOSE SERPL-MCNC: 155 MG/DL — HIGH (ref 70–99)
GLUCOSE SERPL-MCNC: 181 MG/DL — HIGH (ref 70–99)
GLUCOSE SERPL-MCNC: 343 MG/DL — HIGH (ref 70–99)
HCT VFR BLD CALC: 33.5 % — LOW (ref 37–47)
HGB BLD-MCNC: 10.4 G/DL — LOW (ref 12–16)
IMM GRANULOCYTES NFR BLD AUTO: 0.2 % — SIGNIFICANT CHANGE UP (ref 0.1–0.3)
LYMPHOCYTES # BLD AUTO: 21.58 K/UL — HIGH (ref 1.2–3.4)
LYMPHOCYTES # BLD AUTO: 82.8 % — HIGH (ref 20.5–51.1)
MAGNESIUM SERPL-MCNC: 2.2 MG/DL — SIGNIFICANT CHANGE UP (ref 1.8–2.4)
MAGNESIUM SERPL-MCNC: 2.2 MG/DL — SIGNIFICANT CHANGE UP (ref 1.8–2.4)
MAGNESIUM SERPL-MCNC: 2.4 MG/DL — SIGNIFICANT CHANGE UP (ref 1.8–2.4)
MCHC RBC-ENTMCNC: 27.3 PG — SIGNIFICANT CHANGE UP (ref 27–31)
MCHC RBC-ENTMCNC: 31 G/DL — LOW (ref 32–37)
MCV RBC AUTO: 87.9 FL — SIGNIFICANT CHANGE UP (ref 81–99)
MONOCYTES # BLD AUTO: 0.87 K/UL — HIGH (ref 0.1–0.6)
MONOCYTES NFR BLD AUTO: 3.3 % — SIGNIFICANT CHANGE UP (ref 1.7–9.3)
NEUTROPHILS # BLD AUTO: 3.26 K/UL — SIGNIFICANT CHANGE UP (ref 1.4–6.5)
NEUTROPHILS NFR BLD AUTO: 12.6 % — LOW (ref 42.2–75.2)
NRBC # BLD: 0 /100 WBCS — SIGNIFICANT CHANGE UP (ref 0–0)
PLATELET # BLD AUTO: 224 K/UL — SIGNIFICANT CHANGE UP (ref 130–400)
PMV BLD: 9.1 FL — SIGNIFICANT CHANGE UP (ref 7.4–10.4)
POTASSIUM SERPL-MCNC: 4.2 MMOL/L — SIGNIFICANT CHANGE UP (ref 3.5–5)
POTASSIUM SERPL-MCNC: 4.5 MMOL/L — SIGNIFICANT CHANGE UP (ref 3.5–5)
POTASSIUM SERPL-MCNC: 4.9 MMOL/L — SIGNIFICANT CHANGE UP (ref 3.5–5)
POTASSIUM SERPL-SCNC: 4.2 MMOL/L — SIGNIFICANT CHANGE UP (ref 3.5–5)
POTASSIUM SERPL-SCNC: 4.5 MMOL/L — SIGNIFICANT CHANGE UP (ref 3.5–5)
POTASSIUM SERPL-SCNC: 4.9 MMOL/L — SIGNIFICANT CHANGE UP (ref 3.5–5)
PROT SERPL-MCNC: 6.1 G/DL — SIGNIFICANT CHANGE UP (ref 6–8)
RBC # BLD: 3.81 M/UL — LOW (ref 4.2–5.4)
RBC # FLD: 17.8 % — HIGH (ref 11.5–14.5)
SODIUM SERPL-SCNC: 131 MMOL/L — LOW (ref 135–146)
SODIUM SERPL-SCNC: 134 MMOL/L — LOW (ref 135–146)
SODIUM SERPL-SCNC: 136 MMOL/L — SIGNIFICANT CHANGE UP (ref 135–146)
WBC # BLD: 26.06 K/UL — HIGH (ref 4.8–10.8)
WBC # FLD AUTO: 26.06 K/UL — HIGH (ref 4.8–10.8)

## 2024-09-20 PROCEDURE — 99233 SBSQ HOSP IP/OBS HIGH 50: CPT

## 2024-09-20 RX ORDER — FUROSEMIDE 10 MG/ML
40 INJECTION INTRAVENOUS ONCE
Refills: 0 | Status: COMPLETED | OUTPATIENT
Start: 2024-09-20 | End: 2024-09-20

## 2024-09-20 RX ADMIN — GABAPENTIN 100 MILLIGRAM(S): 800 TABLET, FILM COATED ORAL at 14:18

## 2024-09-20 RX ADMIN — Medication 650 MILLIGRAM(S): at 05:32

## 2024-09-20 RX ADMIN — Medication 650 MILLIGRAM(S): at 15:15

## 2024-09-20 RX ADMIN — Medication 3.12 MILLIGRAM(S): at 05:22

## 2024-09-20 RX ADMIN — Medication 3: at 17:11

## 2024-09-20 RX ADMIN — INSULIN GLARGINE 10 UNIT(S): 300 INJECTION, SOLUTION SUBCUTANEOUS at 21:58

## 2024-09-20 RX ADMIN — Medication 81 MILLIGRAM(S): at 11:29

## 2024-09-20 RX ADMIN — FUROSEMIDE 40 MILLIGRAM(S): 10 INJECTION INTRAVENOUS at 14:51

## 2024-09-20 RX ADMIN — CHLORHEXIDINE GLUCONATE ORAL RINSE 1 APPLICATION(S): 1.2 SOLUTION DENTAL at 11:29

## 2024-09-20 RX ADMIN — GABAPENTIN 100 MILLIGRAM(S): 800 TABLET, FILM COATED ORAL at 21:58

## 2024-09-20 RX ADMIN — Medication 1: at 08:06

## 2024-09-20 RX ADMIN — Medication 650 MILLIGRAM(S): at 14:17

## 2024-09-20 RX ADMIN — LOSARTAN POTASSIUM 50 MILLIGRAM(S): 100 TABLET, FILM COATED ORAL at 05:22

## 2024-09-20 RX ADMIN — ATORVASTATIN CALCIUM 20 MILLIGRAM(S): 10 TABLET, FILM COATED ORAL at 21:58

## 2024-09-20 RX ADMIN — GABAPENTIN 100 MILLIGRAM(S): 800 TABLET, FILM COATED ORAL at 05:22

## 2024-09-20 RX ADMIN — ENOXAPARIN SODIUM 40 MILLIGRAM(S): 150 INJECTION SUBCUTANEOUS at 17:13

## 2024-09-20 RX ADMIN — Medication 1: at 11:29

## 2024-09-20 RX ADMIN — FUROSEMIDE 40 MILLIGRAM(S): 10 INJECTION INTRAVENOUS at 05:22

## 2024-09-20 RX ADMIN — Medication 3.12 MILLIGRAM(S): at 17:12

## 2024-09-20 RX ADMIN — Medication 650 MILLIGRAM(S): at 06:12

## 2024-09-20 RX ADMIN — LOSARTAN POTASSIUM 50 MILLIGRAM(S): 100 TABLET, FILM COATED ORAL at 17:12

## 2024-09-20 NOTE — PROGRESS NOTE ADULT - TIME BILLING
Review of all data  Eval and exam  Patient discussion  Conversation with family
Care coordination with Structural Cardiology  Eval and exam  Daughter update

## 2024-09-20 NOTE — PROGRESS NOTE ADULT - ASSESSMENT
Assessment:  88F pmhx DM, HTN, aortic stenosis, DM, CAD, D-CHF presents for HFpEF exacerbation 2/2 AS    Problems discussed and associated plan:  #acute hypoxic resp failure   # HFpEF exaccerbation  - lasix iv 40 bid  - Strict I&O's   - Daily weights   - Pro-BNP: 31741  9/16   - BMP BID  - K >4 and mg >2 needs to be replated     # severe AS  - lasix iv 40 bid  - now on 2L NC --> ween O2 as tolerated  - TAVR evaluation/w/u    # NSTEMI II Demand ischemia  - troponin 68-->74-->60  - c/w ASA 81mg     # LFTs  - likely 2/2 overload  - RUQ if not improving    # mild chronic anemia  - f/u iron labs     # HTN  - c/w losartan 50 BID   - c/w carvedilol 3.125 Bid    # HLD  - c/w atorvastatin 20 QD    #DMII   - ISS   - Hold home acarbose, janumet, jardiance     #constipation   - miralax bid  - senna 2 qHS    #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 Perez.    [facility]  DVT prophylaxis: Enoxaparin 40mg QD   GI prophylaxis:   Please contact me with any questions or concerns at [phone].   Assessment:  88F pmhx DM, HTN, aortic stenosis, DM, CAD, D-CHF presents for HFpEF exacerbation 2/2 AS    Problems discussed and associated plan:  #acute hypoxic resp failure   # HFpEF exaccerbation  - Continue lasix iv 40 bid  - Strict I&O's   - Daily weights   - Pro-BNP: 63205  9/16   - BMP BID  - K >4 and mg >2 needs to be replated     # severe AS  - lasix IV  40 bid  - TAVR evaluation/w/u  - CT chest :b/l pleural effusions with adjacent atelectasis/consolidation of the lower lobes. There are bilateral lung groundglass opacities.  Several thoracic vertebral body compression deformities, which are new/increased of indeterminate age as well as new sclerosis seen in some of the thoracic vertebral bodies compared to CT 5/11/2023. Metastatic disease cannot be entirely excluded and further evaluation is recommended    Question small bowel wall thickening which may be of infectious or   inflammatory etiology    Please refer to detailed findings otherwise described above.      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 Perez.    #Misc  DVT prophylaxis: Enoxaparin 40mg QD   GI prophylaxis:   Please contact me with any questions or concerns at [phone].   Assessment:  88F pmhx DM, HTN, aortic stenosis, DM, CAD, D-CHF presents for HFpEF exacerbation 2/2 AS    Problems discussed and associated plan:  #acute hypoxic resp failure   # HFpEF exaccerbation  - Continue lasix iv 40 bid  - Strict I&O's   - Daily weights   - Pro-BNP: 60033  9/16   - BMP BID  - K >4 and mg >2 needs to be replated     # severe AS  - lasix IV  40 bid  - TAVR evaluation/w/u  - CT chest :b/l pleural effusions with adjacent atelectasis/consolidation of the lower lobes. There are bilateral lung groundglass opacities.  Several thoracic vertebral body compression deformities, which are new/increased of indeterminate age as well as new sclerosis seen in some of the thoracic vertebral bodies compared to CT 5/11/2023. Metastatic disease cannot be entirely excluded and further evaluation is recommende small bowel wall thickening which may be of infectious or inflammatory etiology      # NSTEMI II Demand ischemia  - troponin 68-->74-->60  - c/w ASA 81mg     # LFTs  - likely 2/2 overload  -Improving LFts, continue to monitor   - RUQ if not improving    # mild chronic anemia  - Iron 33 bordeline low. 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 Perez.    #Misc  DVT prophylaxis: Enoxaparin 40mg QD   GI prophylaxis:   Please contact me with any questions or concerns at [phone].

## 2024-09-20 NOTE — PROGRESS NOTE ADULT - SUBJECTIVE AND OBJECTIVE BOX
Chief complaint: Patient is a 88y old  Female who presents with a chief complaint of shortness of breath (17 Sep 2024 14:05)    Hospital Course: 88F pmhx DM, HTN, aortic stenosis, DM, CAD, D-CHF presents for shortness of breath overnight. Patient was hospitalized last week with dyspnea and chest pain, underwent stress test which was positive. Patient then had left heart cath which identified single vessel disease and severe aortic stenosis. Daughter states that TAVR was deferred as patient is at increased risk due to her age. Patient was supposed to follow up with Dr. España in a few days outpatient. Patient was on lasix during last admission but was not discharged home with it. Patient denies chest pain. She states that shortness of breath feels better after initial dose of lasix and oxygen administration. Patient also with increased swelling of lower extremities     Interval history: Pt was seen and examined at bedside in NAD. No overnight events    Review of systems: A complete 10-point review of systems was obtained and is negative except as stated in the interval history.    Vitals:  Vital Signs Last 24 Hrs  T(C): 36.2 (19 Sep 2024 07:10), Max: 37.2 (18 Sep 2024 15:25)  T(F): 97.1 (19 Sep 2024 07:10), Max: 99 (18 Sep 2024 15:25)  HR: 70 (19 Sep 2024 07:10) (64 - 70)  BP: 134/61 (19 Sep 2024 07:10) (134/61 - 150/65)  BP(mean): 88 (19 Sep 2024 07:10) (88 - 93)  RR: 20 (19 Sep 2024 04:51) (19 - 20)  SpO2: 98% (19 Sep 2024 04:51) (96% - 98%)    Parameters below as of 19 Sep 2024 04:51  Patient On (Oxygen Delivery Method): room air      Ins & outs:      @ 07:  -   @ 07:00  --------------------------------------------------------  IN: 0 mL / OUT: 200 mL / NET: -200 mL     @ 07: @ 02:05  --------------------------------------------------------  IN: 0 mL / OUT: 1050 mL / NET: -1050 mL      18 Sep 2024 07:01  -  19 Sep 2024 07:00  --------------------------------------------------------  IN: 788 mL / OUT: 1950 mL / NET: -1162 mL        Weight trend:  Weight (kg): 49.3 ()    Physical exam:  General: No apparent distress  HEENT: Anicteric sclera. Moist mucous membranes.   Cardiac: Regular rate and rhythm. No murmurs, rubs, or gallops.   Vascular: Symmetric radial pulses. Dorsalis pedis pulses palpable.   Respiratory: Normal effort. Clear to ascultation.   Abdomen: Soft, nontender. Audible bowel sounds.   Extremities: Warm with mild b/L LE edema. No cyanosis or clubbing.   Skin: Warm and dry. No rash.   Neurologic: Grossly normal motor function.   Psychiatric: Oriented to person, place, and time       Data reviewed:  - Telemetry: NSR 70s bpm  - EC/4/24: Diagnosis Line Sinus bradycardia. Left anterior fascicular block. Anteroseptal infarct , age undetermined. T wave abnormality, consider inferolateral ischemia. with 59bpm   - TTE:   24:   1. Left ventricular ejection fraction, by visual estimation, is 50 to   55%.   2. Mildly decreased segmental left ventricular systolic function.   3. Mid and apical anterior septum, apical inferior segment, and apex are   abnormal as described above.   4. Mild left ventricular hypertrophy.   5. Normal right ventricular size and function.   6. Mild to moderately enlarged left atrium.   7. Mild mitral annular calcification.   8. Mild to moderate mitral valve regurgitation.   9. Moderate tricuspid regurgitation.  10. Mild aortic regurgitation.  11. Moderate to severe aortic valve stenosis.  12. Mild pulmonic valve regurgitation.  13. There is mild aortic root calcification.  - Chest x-ray   9/16/24:   Stable bilateral lung opacities  Prominence to the heart and mediastinum.  Further evaluation with a PA view the chest is recommended  - Stress test:   24:   1.   Small to moderate size severe reversible defect in the inferolateral   wall and small reversible defect in the anteroseptal wall of the left   ventricle consistent with ischemia and suggesting ischemia in multiple   coronary territories.  2.  Dilated left ventricle with mild global hypokinesis. All walls of the   left ventricle thicken.  3.  Left ventricular ejection fraction calculated as 46% which is low.   Wall motion analysis suggests ejection fraction of 40-45%.   Echocardiographic ejection fraction by visual estimation 50-55% on   2024  - CCTA:  - Cardiac catheterization:   24:   POST-OP DIAGNOSIS:    Severe single vessel disease  Severe AS    Coronary Dominance: Right    LM: short segment engaged    LAD: Medium Sized Vessel  pLAD: 85% lesion at distal third   mLAD: moderate atherosclerotic disease  dLAD: mild atherosclerotic disease  D1: severe ostial stenosis, small vessel  D2: minor luminal irregularities    CX: medium sized vessel  pLcx: minor luminal irregularities  dLcx: tortuous vessel with minor luminal irregularities  OM1: Large tortuous vessel, no significant disease    RCA: Medium sized, dominant vessel  pRCA: mild atherosclerotic disease  mRCA: mild atherosclerotic disease  dRCA: minor luminal irregularities  RPDA: no significant disease  RPL: no significant disease    LVEDP: 15 mmHg     EF: 50 % by Echo    PA % sat: 75.2%  SaO2 % sat: 98.5%    RA pressure: 9 mmHg  RV pressure: 40/13 mmHg  PA pressure: 44/17/25 mmHg  PCWP: 23/17/14 mmHg    CVP: 9 mmHg  CO/CI Jc: 3.24 L/min - 2.16 L/min/m2  PVR (woods units): 2.47 Woods units  SVR (Dynes.s/cm5):   1728 dynes.s/cm5    AV gradient: 40 mmHg (peak to peak gradient)  ALEX: 0.51 cm2 (by Hakki equation)        - Labs:                        10.4   28.07 )-----------( 260      ( 19 Sep 2024 06:02 )             33.3     09-19    135  |  101  |  38[H]  ----------------------------<  159[H]  4.2   |  26  |  0.6[L]    Ca    8.5      19 Sep 2024 06:02  Mg     1.8         TPro  6.2  /  Alb  3.4[L]  /  TBili  0.5  /  DBili  <0.2  /  AST  90[H]  /  ALT  154[H]  /  AlkPhos  173[H]      LIVER FUNCTIONS - ( 18 Sep 2024 06:02 )  Alb: 3.4 g/dL / Pro: 6.2 g/dL / ALK PHOS: 173 U/L / ALT: 154 U/L / AST: 90 U/L / GGT: x           PT/INR - ( 18 Sep 2024 06:02 )   PT: 10.90 sec;   INR: 0.96 ratio         PTT - ( 18 Sep 2024 06:02 )  PTT:30.1 sec          Lactate Trend    Urinalysis Basic - ( 19 Sep 2024 06:02 )    Color: x / Appearance: x / SG: x / pH: x  Gluc: 159 mg/dL / Ketone: x  / Bili: x / Urobili: x   Blood: x / Protein: x / Nitrite: x   Leuk Esterase: x / RBC: x / WBC x   Sq Epi: x / Non Sq Epi: x / Bacteria: x                  Medications:  aspirin enteric coated 81 milliGRAM(s) Oral daily  atorvastatin 20 milliGRAM(s) Oral at bedtime  carvedilol 3.125 milliGRAM(s) Oral every 12 hours  chlorhexidine 2% Cloths 1 Application(s) Topical daily  dextrose 50% Injectable 12.5 Gram(s) IV Push once  dextrose 50% Injectable 25 Gram(s) IV Push once  dextrose 50% Injectable 25 Gram(s) IV Push once  enoxaparin Injectable 40 milliGRAM(s) SubCutaneous every 24 hours  furosemide   Injectable 40 milliGRAM(s) IV Push two times a day  gabapentin 100 milliGRAM(s) Oral every 8 hours  glucagon  Injectable 1 milliGRAM(s) IntraMuscular once  insulin glargine Injectable (LANTUS) 12 Unit(s) SubCutaneous at bedtime  insulin lispro (ADMELOG) corrective regimen sliding scale   SubCutaneous three times a day before meals  insulin lispro Injectable (ADMELOG) 3 Unit(s) SubCutaneous three times a day before meals  losartan 50 milliGRAM(s) Oral every 12 hours  polyethylene glycol 3350 17 Gram(s) Oral two times a day  senna 2 Tablet(s) Oral at bedtime    Drips:  dextrose 5%. 1000 milliLiter(s) (50 mL/Hr) IV Continuous <Continuous>  dextrose 5%. 1000 milliLiter(s) (100 mL/Hr) IV Continuous <Continuous>    PRN:     Allergies    No Known Drug Allergies  Alcohol (Hives; Angioedema)    Intolerances       Chief complaint: Patient is a 88y old  Female who presents with a chief complaint of shortness of breath (17 Sep 2024 14:05)    Hospital Course: 88F pmhx DM, HTN, aortic stenosis, DM, CAD, D-CHF presents for shortness of breath overnight. Patient was hospitalized last week with dyspnea and chest pain, underwent stress test which was positive. Patient then had left heart cath which identified single vessel disease and severe aortic stenosis. Daughter states that TAVR was deferred as patient is at increased risk due to her age. Patient was supposed to follow up with Dr. España in a few days outpatient. Patient was on lasix during last admission but was not discharged home with it. Patient denies chest pain. She states that shortness of breath feels better after initial dose of lasix and oxygen administration. Patient also with increased swelling of lower extremities     Interval history: Pt was seen and examined at bedside in NAD. No overnight events    Review of systems: A complete 10-point review of systems was obtained and is negative except as stated in the interval history.    Vitals:  Vital Signs Last 24 Hrs  T(C): 36.8 (20 Sep 2024 16:30), Max: 36.8 (20 Sep 2024 16:30)  T(F): 98.3 (20 Sep 2024 16:30), Max: 98.3 (20 Sep 2024 16:30)  HR: 61 (20 Sep 2024 16:30) (59 - 68)  BP: 142/61 (20 Sep 2024 16:30) (136/59 - 153/70)  BP(mean): 88 (20 Sep 2024 16:30) (85 - 100)  RR: 18 (20 Sep 2024 16:30) (18 - 20)  SpO2: 96% (20 Sep 2024 16:30) (96% - 98%)    Parameters below as of 20 Sep 2024 03:49  Patient On (Oxygen Delivery Method): room air        Ins & outs:      @ 07:  -   @ 07:00  --------------------------------------------------------  IN: 0 mL / OUT: 200 mL / NET: -200 mL     @ 07:01  -  09-18 @ 02:05  --------------------------------------------------------  IN: 0 mL / OUT: 1050 mL / NET: -1050 mL      18 Sep 2024 07:  -  19 Sep 2024 07:00  --------------------------------------------------------  IN: 788 mL / OUT: 1950 mL / NET: -1162 mL      20 Sep 2024 07:01  -  20 Sep 2024 17:53  --------------------------------------------------------  IN: 0 mL / OUT: 900 mL / NET: -900 mL        Weight trend:  Weight (kg): 49.3 ()    Physical exam:  General: No apparent distress  HEENT: Anicteric sclera. Moist mucous membranes.   Cardiac: Regular rate and rhythm. No murmurs, rubs, or gallops.   Vascular: Symmetric radial pulses. Dorsalis pedis pulses palpable.   Respiratory: Normal effort. b/l  decreased sounds  Abdomen: Soft, nontender. Audible bowel sounds.   Extremities: Warm with mild b/L LE edema. No cyanosis or clubbing.   Skin: Warm and dry. No rash.   Neurologic: Grossly normal motor function.   Psychiatric: Oriented to person, place, and time       Data reviewed:  - Telemetry: NSR 70s-80s  bpm  - EC/4/24: Diagnosis Line Sinus bradycardia. Left anterior fascicular block. Anteroseptal infarct , age undetermined. T wave abnormality, consider inferolateral ischemia. with 59bpm   - TTE:   24:   1. Left ventricular ejection fraction, by visual estimation, is 50 to   55%.   2. Mildly decreased segmental left ventricular systolic function.   3. Mid and apical anterior septum, apical inferior segment, and apex are   abnormal as described above.   4. Mild left ventricular hypertrophy.   5. Normal right ventricular size and function.   6. Mild to moderately enlarged left atrium.   7. Mild mitral annular calcification.   8. Mild to moderate mitral valve regurgitation.   9. Moderate tricuspid regurgitation.  10. Mild aortic regurgitation.  11. Moderate to severe aortic valve stenosis.  12. Mild pulmonic valve regurgitation.  13. There is mild aortic root calcification.  - Chest x-ray   24:   Stable bilateral lung opacities  Prominence to the heart and mediastinum.  Further evaluation with a PA view the chest is recommended  - Stress test:   24:   1.   Small to moderate size severe reversible defect in the inferolateral   wall and small reversible defect in the anteroseptal wall of the left   ventricle consistent with ischemia and suggesting ischemia in multiple   coronary territories.  2.  Dilated left ventricle with mild global hypokinesis. All walls of the   left ventricle thicken.  3.  Left ventricular ejection fraction calculated as 46% which is low.   Wall motion analysis suggests ejection fraction of 40-45%.   Echocardiographic ejection fraction by visual estimation 50-55% on   2024  - CCTA:  - Cardiac catheterization:   24:   POST-OP DIAGNOSIS:    Severe single vessel disease  Severe AS    Coronary Dominance: Right    LM: short segment engaged    LAD: Medium Sized Vessel  pLAD: 85% lesion at distal third   mLAD: moderate atherosclerotic disease  dLAD: mild atherosclerotic disease  D1: severe ostial stenosis, small vessel  D2: minor luminal irregularities    CX: medium sized vessel  pLcx: minor luminal irregularities  dLcx: tortuous vessel with minor luminal irregularities  OM1: Large tortuous vessel, no significant disease    RCA: Medium sized, dominant vessel  pRCA: mild atherosclerotic disease  mRCA: mild atherosclerotic disease  dRCA: minor luminal irregularities  RPDA: no significant disease  RPL: no significant disease    LVEDP: 15 mmHg     EF: 50 % by Echo    PA % sat: 75.2%  SaO2 % sat: 98.5%    RA pressure: 9 mmHg  RV pressure: 40/13 mmHg  PA pressure: 44/17/25 mmHg  PCWP: 23/17/14 mmHg    CVP: 9 mmHg  CO/CI Jc: 3.24 L/min - 2.16 L/min/m2  PVR (woods units): 2.47 Woods units  SVR (Dynes.s/cm5):   1728 dynes.s/cm5    AV gradient: 40 mmHg (peak to peak gradient)  ALEX: 0.51 cm2 (by Hakki equation)      - Labs:                        10.4   26.06 )-----------( 224      ( 20 Sep 2024 05:38 )             33.5         134[L]  |  101  |  38[H]  ----------------------------<  155[H]  4.5   |  26  |  0.5[L]    Ca    8.5      20 Sep 2024 05:38  Mg     2.2         TPro  6.1  /  Alb  3.4[L]  /  TBili  0.3  /  DBili  x   /  AST  39  /  ALT  98[H]  /  AlkPhos  166[H]      LIVER FUNCTIONS - ( 20 Sep 2024 05:38 )  Alb: 3.4 g/dL / Pro: 6.1 g/dL / ALK PHOS: 166 U/L / ALT: 98 U/L / AST: 39 U/L / GGT: x                     Lactate Trend    Urinalysis Basic - ( 20 Sep 2024 05:38 )    Color: x / Appearance: x / SG: x / pH: x  Gluc: 155 mg/dL / Ketone: x  / Bili: x / Urobili: x   Blood: x / Protein: x / Nitrite: x   Leuk Esterase: x / RBC: x / WBC x   Sq Epi: x / Non Sq Epi: x / Bacteria: x                        Medications:  aspirin enteric coated 81 milliGRAM(s) Oral daily  atorvastatin 20 milliGRAM(s) Oral at bedtime  carvedilol 3.125 milliGRAM(s) Oral every 12 hours  chlorhexidine 2% Cloths 1 Application(s) Topical daily  dextrose 50% Injectable 12.5 Gram(s) IV Push once  dextrose 50% Injectable 25 Gram(s) IV Push once  dextrose 50% Injectable 25 Gram(s) IV Push once  enoxaparin Injectable 40 milliGRAM(s) SubCutaneous every 24 hours  furosemide   Injectable 40 milliGRAM(s) IV Push two times a day  gabapentin 100 milliGRAM(s) Oral every 8 hours  glucagon  Injectable 1 milliGRAM(s) IntraMuscular once  insulin glargine Injectable (LANTUS) 12 Unit(s) SubCutaneous at bedtime  insulin lispro (ADMELOG) corrective regimen sliding scale   SubCutaneous three times a day before meals  insulin lispro Injectable (ADMELOG) 3 Unit(s) SubCutaneous three times a day before meals  losartan 50 milliGRAM(s) Oral every 12 hours  polyethylene glycol 3350 17 Gram(s) Oral two times a day  senna 2 Tablet(s) Oral at bedtime    Drips:  dextrose 5%. 1000 milliLiter(s) (50 mL/Hr) IV Continuous <Continuous>  dextrose 5%. 1000 milliLiter(s) (100 mL/Hr) IV Continuous <Continuous>    PRN:     Allergies    No Known Drug Allergies  Alcohol (Hives; Angioedema)    Intolerances

## 2024-09-21 LAB
ANION GAP SERPL CALC-SCNC: 9 MMOL/L — SIGNIFICANT CHANGE UP (ref 7–14)
BUN SERPL-MCNC: 40 MG/DL — HIGH (ref 10–20)
CALCIUM SERPL-MCNC: 8.9 MG/DL — SIGNIFICANT CHANGE UP (ref 8.4–10.5)
CHLORIDE SERPL-SCNC: 99 MMOL/L — SIGNIFICANT CHANGE UP (ref 98–110)
CO2 SERPL-SCNC: 26 MMOL/L — SIGNIFICANT CHANGE UP (ref 17–32)
CREAT SERPL-MCNC: 0.9 MG/DL — SIGNIFICANT CHANGE UP (ref 0.7–1.5)
EGFR: 62 ML/MIN/1.73M2 — SIGNIFICANT CHANGE UP
GLUCOSE BLDC GLUCOMTR-MCNC: 174 MG/DL — HIGH (ref 70–99)
GLUCOSE BLDC GLUCOMTR-MCNC: 193 MG/DL — HIGH (ref 70–99)
GLUCOSE BLDC GLUCOMTR-MCNC: 237 MG/DL — HIGH (ref 70–99)
GLUCOSE BLDC GLUCOMTR-MCNC: 272 MG/DL — HIGH (ref 70–99)
GLUCOSE SERPL-MCNC: 205 MG/DL — HIGH (ref 70–99)
HCT VFR BLD CALC: 33.9 % — LOW (ref 37–47)
HGB BLD-MCNC: 10.7 G/DL — LOW (ref 12–16)
MAGNESIUM SERPL-MCNC: 2.1 MG/DL — SIGNIFICANT CHANGE UP (ref 1.8–2.4)
MCHC RBC-ENTMCNC: 27.5 PG — SIGNIFICANT CHANGE UP (ref 27–31)
MCHC RBC-ENTMCNC: 31.6 G/DL — LOW (ref 32–37)
MCV RBC AUTO: 87.1 FL — SIGNIFICANT CHANGE UP (ref 81–99)
NRBC # BLD: 0 /100 WBCS — SIGNIFICANT CHANGE UP (ref 0–0)
PLATELET # BLD AUTO: 249 K/UL — SIGNIFICANT CHANGE UP (ref 130–400)
PMV BLD: 9.7 FL — SIGNIFICANT CHANGE UP (ref 7.4–10.4)
POTASSIUM SERPL-MCNC: 5.3 MMOL/L — HIGH (ref 3.5–5)
POTASSIUM SERPL-SCNC: 5.3 MMOL/L — HIGH (ref 3.5–5)
RBC # BLD: 3.89 M/UL — LOW (ref 4.2–5.4)
RBC # FLD: 17.3 % — HIGH (ref 11.5–14.5)
SODIUM SERPL-SCNC: 134 MMOL/L — LOW (ref 135–146)
WBC # BLD: 27.76 K/UL — HIGH (ref 4.8–10.8)
WBC # FLD AUTO: 27.76 K/UL — HIGH (ref 4.8–10.8)

## 2024-09-21 PROCEDURE — 93010 ELECTROCARDIOGRAM REPORT: CPT

## 2024-09-21 PROCEDURE — 99233 SBSQ HOSP IP/OBS HIGH 50: CPT

## 2024-09-21 RX ORDER — IRON SUCROSE 20 MG/ML
200 INJECTION, SOLUTION INTRAVENOUS EVERY 24 HOURS
Refills: 0 | Status: COMPLETED | OUTPATIENT
Start: 2024-09-21 | End: 2024-09-25

## 2024-09-21 RX ORDER — INSULIN LISPRO 100/ML
VIAL (ML) SUBCUTANEOUS
Refills: 0 | Status: DISCONTINUED | OUTPATIENT
Start: 2024-09-21 | End: 2024-09-21

## 2024-09-21 RX ORDER — INSULIN LISPRO 100/ML
VIAL (ML) SUBCUTANEOUS
Refills: 0 | Status: DISCONTINUED | OUTPATIENT
Start: 2024-09-21 | End: 2024-09-25

## 2024-09-21 RX ADMIN — Medication 3.12 MILLIGRAM(S): at 05:58

## 2024-09-21 RX ADMIN — GABAPENTIN 100 MILLIGRAM(S): 800 TABLET, FILM COATED ORAL at 05:58

## 2024-09-21 RX ADMIN — GABAPENTIN 100 MILLIGRAM(S): 800 TABLET, FILM COATED ORAL at 21:26

## 2024-09-21 RX ADMIN — ENOXAPARIN SODIUM 40 MILLIGRAM(S): 150 INJECTION SUBCUTANEOUS at 17:39

## 2024-09-21 RX ADMIN — LOSARTAN POTASSIUM 50 MILLIGRAM(S): 100 TABLET, FILM COATED ORAL at 17:39

## 2024-09-21 RX ADMIN — CHLORHEXIDINE GLUCONATE ORAL RINSE 1 APPLICATION(S): 1.2 SOLUTION DENTAL at 11:18

## 2024-09-21 RX ADMIN — LOSARTAN POTASSIUM 50 MILLIGRAM(S): 100 TABLET, FILM COATED ORAL at 05:58

## 2024-09-21 RX ADMIN — GABAPENTIN 100 MILLIGRAM(S): 800 TABLET, FILM COATED ORAL at 14:34

## 2024-09-21 RX ADMIN — IRON SUCROSE 100 MILLIGRAM(S): 20 INJECTION, SOLUTION INTRAVENOUS at 16:01

## 2024-09-21 RX ADMIN — INSULIN GLARGINE 10 UNIT(S): 300 INJECTION, SOLUTION SUBCUTANEOUS at 21:26

## 2024-09-21 RX ADMIN — FUROSEMIDE 40 MILLIGRAM(S): 10 INJECTION INTRAVENOUS at 05:58

## 2024-09-21 RX ADMIN — ATORVASTATIN CALCIUM 20 MILLIGRAM(S): 10 TABLET, FILM COATED ORAL at 21:25

## 2024-09-21 RX ADMIN — Medication 1: at 08:17

## 2024-09-21 RX ADMIN — Medication 81 MILLIGRAM(S): at 11:18

## 2024-09-21 RX ADMIN — Medication 3: at 16:38

## 2024-09-21 RX ADMIN — Medication 3.12 MILLIGRAM(S): at 17:39

## 2024-09-21 RX ADMIN — Medication 1: at 11:15

## 2024-09-21 NOTE — PROGRESS NOTE ADULT - ASSESSMENT
Assessment:  Patient is an 88-year-old Female with a pmhx HTN, aortic stenosis, DM, CAD, D-CHF presents for HFpEF exacerbation 2/2 AS    Problems discussed and associated plan:  #acute hypoxic resp failure   #HFpEF exaccerbation  - D/c lasix iv 40  - Strict I&O's, Daily weights, K >4 and mg >2 needs to be replated   - Pro-BNP: 72240  9/16   - Cr: 1 from 0.5 on PM labs - trend Cr (holding lasix)    # severe AS  - TAVR evaluation w/u  - f/u structural monday  - CT chest :b/l pleural effusions with adjacent atelectasis/consolidation of the lower lobes. There are bilateral lung groundglass opacities.  Several thoracic vertebral body compression deformities, which are new/increased of indeterminate age as well as new sclerosis seen in some of the thoracic vertebral bodies compared to CT 5/11/2023. Metastatic disease cannot be entirely excluded and further evaluation is recommend small bowel wall thickening which may be of infectious or inflammatory etiology    # NSTEMI II Demand ischemia  - troponin 68-->74-->60  - c/w ASA 81mg     # LFTs  - likely 2/2 overload  -Improving LFts, continue to monitor   - RUQ if not improving    # mild chronic anemia  - Iron 33 borderline low. 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 Perez.    #[facility] [place]  DVT prophylaxis: Enoxaparin 40mg QD   Please contact me with any questions or concerns at [phone].

## 2024-09-21 NOTE — PROGRESS NOTE ADULT - SUBJECTIVE AND OBJECTIVE BOX
Chief complaint: Patient is a 88y old  Female who presents with a chief complaint of shortness of breath (17 Sep 2024 14:05)    Hospital Course: 88F pmhx DM, HTN, aortic stenosis, DM, CAD, D-CHF presents for shortness of breath overnight. Patient was hospitalized last week with dyspnea and chest pain, underwent stress test which was positive. Patient then had left heart cath which identified single vessel disease and severe aortic stenosis. Daughter states that TAVR was deferred as patient is at increased risk due to her age. Patient was supposed to follow up with Dr. España in a few days outpatient. Patient was on lasix during last admission but was not discharged home with it. Patient denies chest pain. She states that shortness of breath feels better after initial dose of lasix and oxygen administration. Patient also with increased swelling of lower extremities     Interval history: Pt was seen and examined at bedside in NAD. No overnight events    Review of systems: A complete 10-point review of systems was obtained and is negative except as stated in the interval history.    Vitals:  ICU Vital Signs Last 24 Hrs  T(C): 36.6 (21 Sep 2024 07:33), Max: 36.8 (20 Sep 2024 16:30)  T(F): 97.8 (21 Sep 2024 07:33), Max: 98.3 (20 Sep 2024 16:30)  HR: 59 (21 Sep 2024 07:33) (59 - 73)  BP: 145/64 (21 Sep 2024 07:33) (97/60 - 149/66)  BP(mean): 92 (21 Sep 2024 07:33) (73 - 95)  RR: 18 (21 Sep 2024 07:33) (18 - 18)  SpO2: 96% (20 Sep 2024 16:30) (96% - 96%)        Ins & outs:     09-16 @ 07:01  -  09-17 @ 07:00  --------------------------------------------------------  IN: 0 mL / OUT: 200 mL / NET: -200 mL    09-17 @ 07:01  -  09-18 @ 02:05  --------------------------------------------------------  IN: 0 mL / OUT: 1050 mL / NET: -1050 mL    18 Sep 2024 07:01  -  19 Sep 2024 07:00  --------------------------------------------------------  IN: 788 mL / OUT: 1950 mL / NET: -1162 mL    20 Sep 2024 07:01  -  20 Sep 2024 17:53  --------------------------------------------------------  IN: 0 mL / OUT: 900 mL / NET: -900 mL        Weight trend:  Weight (kg): 49.3 (09-16)    Physical exam:  General: No apparent distress  HEENT: Anicteric sclera. Moist mucous membranes.   Cardiac: Regular rate and rhythm. No murmurs, rubs, or gallops.   Vascular: Symmetric radial pulses. Dorsalis pedis pulses palpable.   Respiratory: Normal effort. b/l  decreased sounds  Abdomen: Soft, nontender. Audible bowel sounds.   Extremities: Warm with mild b/L LE edema. No cyanosis or clubbing.   Skin: Warm and dry. No rash.   Neurologic: Grossly normal motor function.   Psychiatric: Oriented to person, place, and time       Data reviewed:  - Telemetry: NSR HR: 56-70bpm    - ECG 9/4/24: Diagnosis Line Sinus bradycardia. Left anterior fascicular block. Anteroseptal infarct , age undetermined. T wave abnormality, consider inferolateral ischemia. with 59bpm     - TTE 9/4/24:   1. Left ventricular ejection fraction, by visual estimation, is 50 to   55%.   2. Mildly decreased segmental left ventricular systolic function.   3. Mid and apical anterior septum, apical inferior segment, and apex are   abnormal as described above.   4. Mild left ventricular hypertrophy.   5. Normal right ventricular size and function.   6. Mild to moderately enlarged left atrium.   7. Mild mitral annular calcification.   8. Mild to moderate mitral valve regurgitation.   9. Moderate tricuspid regurgitation.  10. Mild aortic regurgitation.  11. Moderate to severe aortic valve stenosis.  12. Mild pulmonic valve regurgitation.  13. There is mild aortic root calcification.    - Chest x-ray 9/16/24:   Stable bilateral lung opacities  Prominence to the heart and mediastinum.  Further evaluation with a PA view the chest is recommended    - Stress test 9/4/24:   1.   Small to moderate size severe reversible defect in the inferolateral   wall and small reversible defect in the anteroseptal wall of the left   ventricle consistent with ischemia and suggesting ischemia in multiple   coronary territories.  2.  Dilated left ventricle with mild global hypokinesis. All walls of the   left ventricle thicken.  3.  Left ventricular ejection fraction calculated as 46% which is low.   Wall motion analysis suggests ejection fraction of 40-45%.   Echocardiographic ejection fraction by visual estimation 50-55% on   09/04/2024    - Cardiac catheterization:   9/9/24:   POST-OP DIAGNOSIS:    Severe single vessel disease  Severe AS  Coronary Dominance: Right  LM: short segment engaged  LAD: Medium Sized Vessel  pLAD: 85% lesion at distal third   mLAD: moderate atherosclerotic disease  dLAD: mild atherosclerotic disease  D1: severe ostial stenosis, small vessel  D2: minor luminal irregularities  CX: medium sized vessel  pLcx: minor luminal irregularities  dLcx: tortuous vessel with minor luminal irregularities  OM1: Large tortuous vessel, no significant disease  RCA: Medium sized, dominant vessel  pRCA: mild atherosclerotic disease  mRCA: mild atherosclerotic disease  dRCA: minor luminal irregularities  RPDA: no significant disease  RPL: no significant disease    LVEDP: 15 mmHg     EF: 50 % by Echo    PA % sat: 75.2%  SaO2 % sat: 98.5%    RA pressure: 9 mmHg  RV pressure: 40/13 mmHg  PA pressure: 44/17/25 mmHg  PCWP: 23/17/14 mmHg    CVP: 9 mmHg  CO/CI Jc: 3.24 L/min - 2.16 L/min/m2  PVR (woods units): 2.47 Woods units  SVR (Dynes.s/cm5):   1728 dynes.s/cm5    AV gradient: 40 mmHg (peak to peak gradient)  ALEX: 0.51 cm2 (by Hakki equation)          - Labs:                        10.4   26.06 )-----------( 224      ( 20 Sep 2024 05:38 )             33.5     09-20    131[L]  |  98  |  40[H]  ----------------------------<  343[H]  4.9   |  25  |  1.0    Ca    8.6      20 Sep 2024 20:13  Mg     2.2     09-20    TPro  6.1  /  Alb  3.4[L]  /  TBili  0.3  /  DBili  x   /  AST  39  /  ALT  98[H]  /  AlkPhos  166[H]  09-20    LIVER FUNCTIONS - ( 20 Sep 2024 05:38 )  Alb: 3.4 g/dL / Pro: 6.1 g/dL / ALK PHOS: 166 U/L / ALT: 98 U/L / AST: 39 U/L / GGT: x           Lactate Trend    Urinalysis Basic - ( 20 Sep 2024 20:13 )    Color: x / Appearance: x / SG: x / pH: x  Gluc: 343 mg/dL / Ketone: x  / Bili: x / Urobili: x   Blood: x / Protein: x / Nitrite: x   Leuk Esterase: x / RBC: x / WBC x   Sq Epi: x / Non Sq Epi: x / Bacteria: x                        Medications:  MEDICATIONS  (STANDING):  aspirin enteric coated 81 milliGRAM(s) Oral daily  atorvastatin 20 milliGRAM(s) Oral at bedtime  carvedilol 3.125 milliGRAM(s) Oral every 12 hours  chlorhexidine 2% Cloths 1 Application(s) Topical daily  dextrose 5%. 1000 milliLiter(s) (50 mL/Hr) IV Continuous <Continuous>  dextrose 5%. 1000 milliLiter(s) (100 mL/Hr) IV Continuous <Continuous>  dextrose 50% Injectable 25 Gram(s) IV Push once  dextrose 50% Injectable 12.5 Gram(s) IV Push once  dextrose 50% Injectable 25 Gram(s) IV Push once  enoxaparin Injectable 40 milliGRAM(s) SubCutaneous every 24 hours  gabapentin 100 milliGRAM(s) Oral every 8 hours  glucagon  Injectable 1 milliGRAM(s) IntraMuscular once  insulin glargine Injectable (LANTUS) 10 Unit(s) SubCutaneous at bedtime  insulin lispro (ADMELOG) corrective regimen sliding scale   SubCutaneous three times a day before meals  losartan 50 milliGRAM(s) Oral every 12 hours  polyethylene glycol 3350 17 Gram(s) Oral two times a day  senna 2 Tablet(s) Oral at bedtime    MEDICATIONS  (PRN):  acetaminophen     Tablet .. 650 milliGRAM(s) Oral every 6 hours PRN Mild Pain (1 - 3)  dextrose Oral Gel 15 Gram(s) Oral once PRN Blood Glucose LESS THAN 70 milliGRAM(s)/deciliter    Allergies    No Known Drug Allergies  Alcohol (Hives; Angioedema)    Intolerances

## 2024-09-22 DIAGNOSIS — I21.A1 MYOCARDIAL INFARCTION TYPE 2: ICD-10-CM

## 2024-09-22 DIAGNOSIS — Z79.84 LONG TERM (CURRENT) USE OF ORAL HYPOGLYCEMIC DRUGS: ICD-10-CM

## 2024-09-22 DIAGNOSIS — I50.33 ACUTE ON CHRONIC DIASTOLIC (CONGESTIVE) HEART FAILURE: ICD-10-CM

## 2024-09-22 DIAGNOSIS — I25.10 ATHEROSCLEROTIC HEART DISEASE OF NATIVE CORONARY ARTERY WITHOUT ANGINA PECTORIS: ICD-10-CM

## 2024-09-22 DIAGNOSIS — Z79.899 OTHER LONG TERM (CURRENT) DRUG THERAPY: ICD-10-CM

## 2024-09-22 DIAGNOSIS — D72.829 ELEVATED WHITE BLOOD CELL COUNT, UNSPECIFIED: ICD-10-CM

## 2024-09-22 DIAGNOSIS — E78.00 PURE HYPERCHOLESTEROLEMIA, UNSPECIFIED: ICD-10-CM

## 2024-09-22 DIAGNOSIS — Z79.4 LONG TERM (CURRENT) USE OF INSULIN: ICD-10-CM

## 2024-09-22 DIAGNOSIS — E11.9 TYPE 2 DIABETES MELLITUS WITHOUT COMPLICATIONS: ICD-10-CM

## 2024-09-22 DIAGNOSIS — I11.0 HYPERTENSIVE HEART DISEASE WITH HEART FAILURE: ICD-10-CM

## 2024-09-22 DIAGNOSIS — I35.0 NONRHEUMATIC AORTIC (VALVE) STENOSIS: ICD-10-CM

## 2024-09-22 DIAGNOSIS — E87.1 HYPO-OSMOLALITY AND HYPONATREMIA: ICD-10-CM

## 2024-09-22 DIAGNOSIS — K59.00 CONSTIPATION, UNSPECIFIED: ICD-10-CM

## 2024-09-22 DIAGNOSIS — J96.01 ACUTE RESPIRATORY FAILURE WITH HYPOXIA: ICD-10-CM

## 2024-09-22 LAB
ALBUMIN SERPL ELPH-MCNC: 3.2 G/DL — LOW (ref 3.5–5.2)
ALP SERPL-CCNC: 181 U/L — HIGH (ref 30–115)
ALT FLD-CCNC: 69 U/L — HIGH (ref 0–41)
ANION GAP SERPL CALC-SCNC: 9 MMOL/L — SIGNIFICANT CHANGE UP (ref 7–14)
AST SERPL-CCNC: 37 U/L — SIGNIFICANT CHANGE UP (ref 0–41)
BILIRUB SERPL-MCNC: 0.3 MG/DL — SIGNIFICANT CHANGE UP (ref 0.2–1.2)
BUN SERPL-MCNC: 34 MG/DL — HIGH (ref 10–20)
CALCIUM SERPL-MCNC: 8.7 MG/DL — SIGNIFICANT CHANGE UP (ref 8.4–10.5)
CHLORIDE SERPL-SCNC: 101 MMOL/L — SIGNIFICANT CHANGE UP (ref 98–110)
CO2 SERPL-SCNC: 27 MMOL/L — SIGNIFICANT CHANGE UP (ref 17–32)
CREAT SERPL-MCNC: 0.5 MG/DL — LOW (ref 0.7–1.5)
EGFR: 91 ML/MIN/1.73M2 — SIGNIFICANT CHANGE UP
GLUCOSE BLDC GLUCOMTR-MCNC: 112 MG/DL — HIGH (ref 70–99)
GLUCOSE BLDC GLUCOMTR-MCNC: 226 MG/DL — HIGH (ref 70–99)
GLUCOSE BLDC GLUCOMTR-MCNC: 232 MG/DL — HIGH (ref 70–99)
GLUCOSE BLDC GLUCOMTR-MCNC: 259 MG/DL — HIGH (ref 70–99)
GLUCOSE BLDC GLUCOMTR-MCNC: 284 MG/DL — HIGH (ref 70–99)
GLUCOSE SERPL-MCNC: 96 MG/DL — SIGNIFICANT CHANGE UP (ref 70–99)
HCT VFR BLD CALC: 32.7 % — LOW (ref 37–47)
HGB BLD-MCNC: 10.2 G/DL — LOW (ref 12–16)
MAGNESIUM SERPL-MCNC: 2.1 MG/DL — SIGNIFICANT CHANGE UP (ref 1.8–2.4)
MCHC RBC-ENTMCNC: 27.3 PG — SIGNIFICANT CHANGE UP (ref 27–31)
MCHC RBC-ENTMCNC: 31.2 G/DL — LOW (ref 32–37)
MCV RBC AUTO: 87.7 FL — SIGNIFICANT CHANGE UP (ref 81–99)
NRBC # BLD: 0 /100 WBCS — SIGNIFICANT CHANGE UP (ref 0–0)
PLATELET # BLD AUTO: 234 K/UL — SIGNIFICANT CHANGE UP (ref 130–400)
PMV BLD: 9.3 FL — SIGNIFICANT CHANGE UP (ref 7.4–10.4)
POTASSIUM SERPL-MCNC: 4.7 MMOL/L — SIGNIFICANT CHANGE UP (ref 3.5–5)
POTASSIUM SERPL-SCNC: 4.7 MMOL/L — SIGNIFICANT CHANGE UP (ref 3.5–5)
PROT SERPL-MCNC: 6 G/DL — SIGNIFICANT CHANGE UP (ref 6–8)
RBC # BLD: 3.73 M/UL — LOW (ref 4.2–5.4)
RBC # FLD: 17.3 % — HIGH (ref 11.5–14.5)
SODIUM SERPL-SCNC: 137 MMOL/L — SIGNIFICANT CHANGE UP (ref 135–146)
WBC # BLD: 23.49 K/UL — HIGH (ref 4.8–10.8)
WBC # FLD AUTO: 23.49 K/UL — HIGH (ref 4.8–10.8)

## 2024-09-22 PROCEDURE — 99233 SBSQ HOSP IP/OBS HIGH 50: CPT

## 2024-09-22 RX ORDER — LIDOCAINE 50 MG/G
1 CREAM TOPICAL EVERY 24 HOURS
Refills: 0 | Status: DISCONTINUED | OUTPATIENT
Start: 2024-09-22 | End: 2024-09-25

## 2024-09-22 RX ORDER — FUROSEMIDE 10 MG/ML
40 INJECTION INTRAVENOUS DAILY
Refills: 0 | Status: DISCONTINUED | OUTPATIENT
Start: 2024-09-22 | End: 2024-09-24

## 2024-09-22 RX ADMIN — LOSARTAN POTASSIUM 50 MILLIGRAM(S): 100 TABLET, FILM COATED ORAL at 17:24

## 2024-09-22 RX ADMIN — IRON SUCROSE 100 MILLIGRAM(S): 20 INJECTION, SOLUTION INTRAVENOUS at 15:03

## 2024-09-22 RX ADMIN — ATORVASTATIN CALCIUM 20 MILLIGRAM(S): 10 TABLET, FILM COATED ORAL at 21:37

## 2024-09-22 RX ADMIN — Medication 3.12 MILLIGRAM(S): at 17:23

## 2024-09-22 RX ADMIN — FUROSEMIDE 40 MILLIGRAM(S): 10 INJECTION INTRAVENOUS at 11:12

## 2024-09-22 RX ADMIN — GABAPENTIN 100 MILLIGRAM(S): 800 TABLET, FILM COATED ORAL at 13:48

## 2024-09-22 RX ADMIN — INSULIN GLARGINE 10 UNIT(S): 300 INJECTION, SOLUTION SUBCUTANEOUS at 21:37

## 2024-09-22 RX ADMIN — Medication 6: at 11:12

## 2024-09-22 RX ADMIN — GABAPENTIN 100 MILLIGRAM(S): 800 TABLET, FILM COATED ORAL at 06:14

## 2024-09-22 RX ADMIN — LIDOCAINE 1 PATCH: 50 CREAM TOPICAL at 15:08

## 2024-09-22 RX ADMIN — Medication 3.12 MILLIGRAM(S): at 06:14

## 2024-09-22 RX ADMIN — Medication 6: at 17:22

## 2024-09-22 RX ADMIN — ENOXAPARIN SODIUM 40 MILLIGRAM(S): 150 INJECTION SUBCUTANEOUS at 17:23

## 2024-09-22 RX ADMIN — Medication 650 MILLIGRAM(S): at 05:06

## 2024-09-22 RX ADMIN — CHLORHEXIDINE GLUCONATE ORAL RINSE 1 APPLICATION(S): 1.2 SOLUTION DENTAL at 11:13

## 2024-09-22 RX ADMIN — Medication 650 MILLIGRAM(S): at 04:54

## 2024-09-22 RX ADMIN — LIDOCAINE 1 PATCH: 50 CREAM TOPICAL at 19:44

## 2024-09-22 RX ADMIN — Medication 650 MILLIGRAM(S): at 16:00

## 2024-09-22 RX ADMIN — Medication 650 MILLIGRAM(S): at 15:08

## 2024-09-22 RX ADMIN — LOSARTAN POTASSIUM 50 MILLIGRAM(S): 100 TABLET, FILM COATED ORAL at 06:19

## 2024-09-22 RX ADMIN — Medication 81 MILLIGRAM(S): at 11:12

## 2024-09-22 RX ADMIN — GABAPENTIN 100 MILLIGRAM(S): 800 TABLET, FILM COATED ORAL at 21:37

## 2024-09-22 NOTE — PROGRESS NOTE ADULT - ASSESSMENT
Assessment:  Patient is an 88-year-old Female with a pmhx HTN, aortic stenosis, DM, CAD, D-CHF presents for HFpEF exacerbation 2/2 AS    Problems discussed and associated plan:  #acute hypoxic resp failure   #HFpEF exaccerbation  - restarted lasix iv 40 daily with ACE bandages on LE  - Strict I&O's, Daily weights, K >4 and mg >2 needs to be replated   - Pro-BNP: 54455  9/16   - Cr improved this AM    # severe AS  - TAVR evaluation w/u  - f/u structural monday  - CT chest :b/l pleural effusions with adjacent atelectasis/consolidation of the lower lobes. There are bilateral lung groundglass opacities.  Several thoracic vertebral body compression deformities, which are new/increased of indeterminate age as well as new sclerosis seen in some of the thoracic vertebral bodies compared to CT 5/11/2023. Metastatic disease cannot be entirely excluded and further evaluation is recommend small bowel wall thickening which may be of infectious or inflammatory etiology    # NSTEMI II Demand ischemia  - troponin 68-->74-->60  - c/w ASA 81mg     # LFTs  - likely 2/2 overload  -Improving LFts, continue to monitor   - RUQ if not improving    # mild chronic anemia  - Iron 33 borderline low. 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 Perez.    #[facility] [place] [facility]  DVT prophylaxis: Enoxaparin 40mg QD   Please contact me with any questions or concerns at [phone].

## 2024-09-22 NOTE — PROGRESS NOTE ADULT - SUBJECTIVE AND OBJECTIVE BOX
Chief complaint: Patient is a 88y old  Female who presents with a chief complaint of shortness of breath (17 Sep 2024 14:05)    Hospital Course: 88F pmhx DM, HTN, aortic stenosis, DM, CAD, D-CHF presents for shortness of breath overnight. Patient was hospitalized last week with dyspnea and chest pain, underwent stress test which was positive. Patient then had left heart cath which identified single vessel disease and severe aortic stenosis.  Patient was supposed to follow up with Dr. España in a few days outpatient. Patient was on lasix during last admission but was not discharged home with it. Patient denies chest pain. She states that shortness of breath feels better after initial dose of lasix and oxygen administration. Patient also with increased swelling of lower extremities     Interval history: Pt was seen and examined at bedside in NAD. No overnight events    Review of systems: A complete 10-point review of systems was obtained and is negative except as stated in the interval history.    Vitals:  ICU Vital Signs Last 24 Hrs  T(C): 36.4 (22 Sep 2024 07:57), Max: 36.7 (21 Sep 2024 16:16)  T(F): 97.5 (22 Sep 2024 07:57), Max: 98 (21 Sep 2024 16:16)  HR: 68 (22 Sep 2024 07:57) (62 - 69)  BP: 151/69 (22 Sep 2024 07:57) (125/54 - 151/69)  BP(mean): 99 (22 Sep 2024 07:57) (78 - 99)  RR: 18 (22 Sep 2024 07:57) (18 - 19)  SpO2: 97% (22 Sep 2024 05:50) (96% - 97%)    O2 Parameters below as of 22 Sep 2024 05:50  Patient On (Oxygen Delivery Method): room air                Ins & outs:     09-16 @ 07:01  -  09-17 @ 07:00  --------------------------------------------------------  IN: 0 mL / OUT: 200 mL / NET: -200 mL    09-17 @ 07:01  -  09-18 @ 02:05  --------------------------------------------------------  IN: 0 mL / OUT: 1050 mL / NET: -1050 mL    18 Sep 2024 07:01  -  19 Sep 2024 07:00  --------------------------------------------------------  IN: 788 mL / OUT: 1950 mL / NET: -1162 mL    20 Sep 2024 07:01  -  20 Sep 2024 17:53  --------------------------------------------------------  IN: 0 mL / OUT: 900 mL / NET: -900 mL    21 Sep 2024 07:01  -  22 Sep 2024 07:00  --------------------------------------------------------  IN: 600 mL / OUT: 2175 mL / NET: -1575 mL    22 Sep 2024 07:01  -  22 Sep 2024 10:43  --------------------------------------------------------  IN: 0 mL / OUT: 200 mL / NET: -200 mL        Weight trend:  Weight (kg): 49.3 (09-16)    Physical exam:  General: No apparent distress  HEENT: Anicteric sclera. Moist mucous membranes.   Cardiac: Regular rate and rhythm. No murmurs, rubs, or gallops.   Vascular: Symmetric radial pulses. Dorsalis pedis pulses palpable.   Respiratory: Normal effort. b/l  decreased sounds  Abdomen: Soft, nontender. Audible bowel sounds.   Extremities: Warm with +1 b/L LE edema. No cyanosis or clubbing.   Skin: Warm and dry. No rash.   Neurologic: Grossly normal motor function.   Psychiatric: Oriented to person, place, and time       Data reviewed:  - Telemetry: NSR HR: 56-70bpm    - ECG 9/4/24: Diagnosis Line Sinus bradycardia. Left anterior fascicular block. Anteroseptal infarct , age undetermined. T wave abnormality, consider inferolateral ischemia. with 59bpm     - TTE 9/4/24:   1. Left ventricular ejection fraction, by visual estimation, is 50 to   55%.   2. Mildly decreased segmental left ventricular systolic function.   3. Mid and apical anterior septum, apical inferior segment, and apex are   abnormal as described above.   4. Mild left ventricular hypertrophy.   5. Normal right ventricular size and function.   6. Mild to moderately enlarged left atrium.   7. Mild mitral annular calcification.   8. Mild to moderate mitral valve regurgitation.   9. Moderate tricuspid regurgitation.  10. Mild aortic regurgitation.  11. Moderate to severe aortic valve stenosis.  12. Mild pulmonic valve regurgitation.  13. There is mild aortic root calcification.    - Chest x-ray 9/16/24:   Stable bilateral lung opacities  Prominence to the heart and mediastinum.  Further evaluation with a PA view the chest is recommended    - Stress test 9/4/24:   1.   Small to moderate size severe reversible defect in the inferolateral   wall and small reversible defect in the anteroseptal wall of the left   ventricle consistent with ischemia and suggesting ischemia in multiple   coronary territories.  2.  Dilated left ventricle with mild global hypokinesis. All walls of the   left ventricle thicken.  3.  Left ventricular ejection fraction calculated as 46% which is low.   Wall motion analysis suggests ejection fraction of 40-45%.   Echocardiographic ejection fraction by visual estimation 50-55% on   09/04/2024    - Cardiac catheterization:   9/9/24:   POST-OP DIAGNOSIS:    Severe single vessel disease  Severe AS  Coronary Dominance: Right  LM: short segment engaged  LAD: Medium Sized Vessel  pLAD: 85% lesion at distal third   mLAD: moderate atherosclerotic disease  dLAD: mild atherosclerotic disease  D1: severe ostial stenosis, small vessel  D2: minor luminal irregularities  CX: medium sized vessel  pLcx: minor luminal irregularities  dLcx: tortuous vessel with minor luminal irregularities  OM1: Large tortuous vessel, no significant disease  RCA: Medium sized, dominant vessel  pRCA: mild atherosclerotic disease  mRCA: mild atherosclerotic disease  dRCA: minor luminal irregularities  RPDA: no significant disease  RPL: no significant disease    LVEDP: 15 mmHg     EF: 50 % by Echo    PA % sat: 75.2%  SaO2 % sat: 98.5%    RA pressure: 9 mmHg  RV pressure: 40/13 mmHg  PA pressure: 44/17/25 mmHg  PCWP: 23/17/14 mmHg    CVP: 9 mmHg  CO/CI Jc: 3.24 L/min - 2.16 L/min/m2  PVR (woods units): 2.47 Woods units  SVR (Dynes.s/cm5):   1728 dynes.s/cm5    AV gradient: 40 mmHg (peak to peak gradient)  ALEX: 0.51 cm2 (by Hakki equation)          - Labs:             - Telemetry:  - ECG (date***):  - Echo (date***):  - Radiology:    - Labs:                        10.2   23.49 )-----------( 234      ( 22 Sep 2024 06:25 )             32.7     09-22    137  |  101  |  34[H]  ----------------------------<  96  4.7   |  27  |  0.5[L]    Ca    8.7      22 Sep 2024 06:25  Mg     2.1     09-22    TPro  6.0  /  Alb  3.2[L]  /  TBili  0.3  /  DBili  x   /  AST  37  /  ALT  69[H]  /  AlkPhos  181[H]  09-22    LIVER FUNCTIONS - ( 22 Sep 2024 06:25 )  Alb: 3.2 g/dL / Pro: 6.0 g/dL / ALK PHOS: 181 U/L / ALT: 69 U/L / AST: 37 U/L / GGT: x                     Lactate Trend    Urinalysis Basic - ( 22 Sep 2024 06:25 )    Color: x / Appearance: x / SG: x / pH: x  Gluc: 96 mg/dL / Ketone: x  / Bili: x / Urobili: x   Blood: x / Protein: x / Nitrite: x   Leuk Esterase: x / RBC: x / WBC x   Sq Epi: x / Non Sq Epi: x / Bacteria: x                        Medications:  MEDICATIONS  (STANDING):  aspirin enteric coated 81 milliGRAM(s) Oral daily  atorvastatin 20 milliGRAM(s) Oral at bedtime  carvedilol 3.125 milliGRAM(s) Oral every 12 hours  chlorhexidine 2% Cloths 1 Application(s) Topical daily  dextrose 5%. 1000 milliLiter(s) (50 mL/Hr) IV Continuous <Continuous>  dextrose 5%. 1000 milliLiter(s) (100 mL/Hr) IV Continuous <Continuous>  dextrose 50% Injectable 25 Gram(s) IV Push once  dextrose 50% Injectable 12.5 Gram(s) IV Push once  dextrose 50% Injectable 25 Gram(s) IV Push once  enoxaparin Injectable 40 milliGRAM(s) SubCutaneous every 24 hours  furosemide   Injectable 40 milliGRAM(s) IV Push daily  gabapentin 100 milliGRAM(s) Oral every 8 hours  glucagon  Injectable 1 milliGRAM(s) IntraMuscular once  insulin glargine Injectable (LANTUS) 10 Unit(s) SubCutaneous at bedtime  insulin lispro (ADMELOG) corrective regimen sliding scale   SubCutaneous three times a day before meals  iron sucrose IVPB 200 milliGRAM(s) IV Intermittent every 24 hours  losartan 50 milliGRAM(s) Oral every 12 hours  polyethylene glycol 3350 17 Gram(s) Oral two times a day  senna 2 Tablet(s) Oral at bedtime    MEDICATIONS  (PRN):  acetaminophen     Tablet .. 650 milliGRAM(s) Oral every 6 hours PRN Mild Pain (1 - 3)  dextrose Oral Gel 15 Gram(s) Oral once PRN Blood Glucose LESS THAN 70 milliGRAM(s)/deciliter    Allergies    No Known Drug Allergies  Alcohol (Hives; Angioedema)    Intolerances

## 2024-09-23 LAB
ANION GAP SERPL CALC-SCNC: 4 MMOL/L — LOW (ref 7–14)
ANION GAP SERPL CALC-SCNC: 6 MMOL/L — LOW (ref 7–14)
BUN SERPL-MCNC: 35 MG/DL — HIGH (ref 10–20)
BUN SERPL-MCNC: 39 MG/DL — HIGH (ref 10–20)
CALCIUM SERPL-MCNC: 8.8 MG/DL — SIGNIFICANT CHANGE UP (ref 8.4–10.4)
CALCIUM SERPL-MCNC: 9.3 MG/DL — SIGNIFICANT CHANGE UP (ref 8.4–10.5)
CHLORIDE SERPL-SCNC: 101 MMOL/L — SIGNIFICANT CHANGE UP (ref 98–110)
CHLORIDE SERPL-SCNC: 99 MMOL/L — SIGNIFICANT CHANGE UP (ref 98–110)
CO2 SERPL-SCNC: 27 MMOL/L — SIGNIFICANT CHANGE UP (ref 17–32)
CO2 SERPL-SCNC: 29 MMOL/L — SIGNIFICANT CHANGE UP (ref 17–32)
CREAT SERPL-MCNC: 0.6 MG/DL — LOW (ref 0.7–1.5)
CREAT SERPL-MCNC: 0.7 MG/DL — SIGNIFICANT CHANGE UP (ref 0.7–1.5)
EGFR: 84 ML/MIN/1.73M2 — SIGNIFICANT CHANGE UP
EGFR: 87 ML/MIN/1.73M2 — SIGNIFICANT CHANGE UP
GLUCOSE BLDC GLUCOMTR-MCNC: 149 MG/DL — HIGH (ref 70–99)
GLUCOSE BLDC GLUCOMTR-MCNC: 165 MG/DL — HIGH (ref 70–99)
GLUCOSE BLDC GLUCOMTR-MCNC: 169 MG/DL — HIGH (ref 70–99)
GLUCOSE BLDC GLUCOMTR-MCNC: 196 MG/DL — HIGH (ref 70–99)
GLUCOSE SERPL-MCNC: 124 MG/DL — HIGH (ref 70–99)
GLUCOSE SERPL-MCNC: 161 MG/DL — HIGH (ref 70–99)
HCT VFR BLD CALC: 31.6 % — LOW (ref 37–47)
HGB BLD-MCNC: 10.6 G/DL — LOW (ref 12–16)
MAGNESIUM SERPL-MCNC: 2 MG/DL — SIGNIFICANT CHANGE UP (ref 1.8–2.4)
MAGNESIUM SERPL-MCNC: 2.2 MG/DL — SIGNIFICANT CHANGE UP (ref 1.8–2.4)
MCHC RBC-ENTMCNC: 29.3 PG — SIGNIFICANT CHANGE UP (ref 27–31)
MCHC RBC-ENTMCNC: 33.5 G/DL — SIGNIFICANT CHANGE UP (ref 32–37)
MCV RBC AUTO: 87.3 FL — SIGNIFICANT CHANGE UP (ref 81–99)
NRBC # BLD: 0 /100 WBCS — SIGNIFICANT CHANGE UP (ref 0–0)
PLATELET # BLD AUTO: 217 K/UL — SIGNIFICANT CHANGE UP (ref 130–400)
PMV BLD: 9.8 FL — SIGNIFICANT CHANGE UP (ref 7.4–10.4)
POTASSIUM SERPL-MCNC: 4.6 MMOL/L — SIGNIFICANT CHANGE UP (ref 3.5–5)
POTASSIUM SERPL-MCNC: 5 MMOL/L — SIGNIFICANT CHANGE UP (ref 3.5–5)
POTASSIUM SERPL-SCNC: 4.6 MMOL/L — SIGNIFICANT CHANGE UP (ref 3.5–5)
POTASSIUM SERPL-SCNC: 5 MMOL/L — SIGNIFICANT CHANGE UP (ref 3.5–5)
RBC # BLD: 3.62 M/UL — LOW (ref 4.2–5.4)
RBC # FLD: 17.8 % — HIGH (ref 11.5–14.5)
SODIUM SERPL-SCNC: 132 MMOL/L — LOW (ref 135–146)
SODIUM SERPL-SCNC: 134 MMOL/L — LOW (ref 135–146)
WBC # BLD: 27.15 K/UL — HIGH (ref 4.8–10.8)
WBC # FLD AUTO: 27.15 K/UL — HIGH (ref 4.8–10.8)

## 2024-09-23 PROCEDURE — 99233 SBSQ HOSP IP/OBS HIGH 50: CPT

## 2024-09-23 RX ORDER — FUROSEMIDE 10 MG/ML
40 INJECTION INTRAVENOUS ONCE
Refills: 0 | Status: COMPLETED | OUTPATIENT
Start: 2024-09-23 | End: 2024-09-23

## 2024-09-23 RX ORDER — INSULIN GLARGINE 300 U/ML
13 INJECTION, SOLUTION SUBCUTANEOUS AT BEDTIME
Refills: 0 | Status: DISCONTINUED | OUTPATIENT
Start: 2024-09-23 | End: 2024-09-25

## 2024-09-23 RX ORDER — TRAMADOL HYDROCHLORIDE 50 MG/1
25 TABLET, COATED ORAL ONCE
Refills: 0 | Status: DISCONTINUED | OUTPATIENT
Start: 2024-09-23 | End: 2024-09-23

## 2024-09-23 RX ADMIN — LIDOCAINE 1 PATCH: 50 CREAM TOPICAL at 19:27

## 2024-09-23 RX ADMIN — ATORVASTATIN CALCIUM 20 MILLIGRAM(S): 10 TABLET, FILM COATED ORAL at 21:40

## 2024-09-23 RX ADMIN — FUROSEMIDE 40 MILLIGRAM(S): 10 INJECTION INTRAVENOUS at 05:39

## 2024-09-23 RX ADMIN — LIDOCAINE 1 PATCH: 50 CREAM TOPICAL at 17:20

## 2024-09-23 RX ADMIN — Medication 3.12 MILLIGRAM(S): at 17:20

## 2024-09-23 RX ADMIN — INSULIN GLARGINE 13 UNIT(S): 300 INJECTION, SOLUTION SUBCUTANEOUS at 21:40

## 2024-09-23 RX ADMIN — FUROSEMIDE 40 MILLIGRAM(S): 10 INJECTION INTRAVENOUS at 17:19

## 2024-09-23 RX ADMIN — IRON SUCROSE 100 MILLIGRAM(S): 20 INJECTION, SOLUTION INTRAVENOUS at 17:21

## 2024-09-23 RX ADMIN — Medication 650 MILLIGRAM(S): at 10:31

## 2024-09-23 RX ADMIN — Medication 2: at 11:51

## 2024-09-23 RX ADMIN — Medication 2: at 17:24

## 2024-09-23 RX ADMIN — Medication 2: at 08:04

## 2024-09-23 RX ADMIN — LIDOCAINE 1 PATCH: 50 CREAM TOPICAL at 03:00

## 2024-09-23 RX ADMIN — GABAPENTIN 100 MILLIGRAM(S): 800 TABLET, FILM COATED ORAL at 13:27

## 2024-09-23 RX ADMIN — GABAPENTIN 100 MILLIGRAM(S): 800 TABLET, FILM COATED ORAL at 21:40

## 2024-09-23 RX ADMIN — CHLORHEXIDINE GLUCONATE ORAL RINSE 1 APPLICATION(S): 1.2 SOLUTION DENTAL at 11:54

## 2024-09-23 RX ADMIN — Medication 650 MILLIGRAM(S): at 03:29

## 2024-09-23 RX ADMIN — TRAMADOL HYDROCHLORIDE 25 MILLIGRAM(S): 50 TABLET, COATED ORAL at 13:27

## 2024-09-23 RX ADMIN — Medication 81 MILLIGRAM(S): at 11:51

## 2024-09-23 RX ADMIN — GABAPENTIN 100 MILLIGRAM(S): 800 TABLET, FILM COATED ORAL at 05:39

## 2024-09-23 RX ADMIN — Medication 3.12 MILLIGRAM(S): at 05:39

## 2024-09-23 RX ADMIN — TRAMADOL HYDROCHLORIDE 25 MILLIGRAM(S): 50 TABLET, COATED ORAL at 14:30

## 2024-09-23 RX ADMIN — LOSARTAN POTASSIUM 50 MILLIGRAM(S): 100 TABLET, FILM COATED ORAL at 05:39

## 2024-09-23 RX ADMIN — ENOXAPARIN SODIUM 40 MILLIGRAM(S): 150 INJECTION SUBCUTANEOUS at 17:19

## 2024-09-23 RX ADMIN — Medication 650 MILLIGRAM(S): at 04:29

## 2024-09-23 RX ADMIN — LOSARTAN POTASSIUM 50 MILLIGRAM(S): 100 TABLET, FILM COATED ORAL at 17:20

## 2024-09-23 RX ADMIN — Medication 650 MILLIGRAM(S): at 11:30

## 2024-09-23 NOTE — PROGRESS NOTE ADULT - ASSESSMENT
Assessment:  Patient is an 88-year-old Female with a pmhx HTN, aortic stenosis, DM, CAD, D-CHF presents for HFpEF exacerbation 2/2 AS    Problems discussed and associated plan:  #acute hypoxic resp failure   #HFpEF exaccerbation  - restarted lasix iv 40 daily with ACE bandages on LE  - Strict I&O's, Daily weights, K >4 and mg >2 needs to be replated   - Pro-BNP: 75986  9/16   - Cr improved this AM    # severe AS  - TAVR evaluation w/u  - f/u structural monday  - CT chest :b/l pleural effusions with adjacent atelectasis/consolidation of the lower lobes. There are bilateral lung groundglass opacities.  Several thoracic vertebral body compression deformities, which are new/increased of indeterminate age as well as new sclerosis seen in some of the thoracic vertebral bodies compared to CT 5/11/2023. Metastatic disease cannot be entirely excluded and further evaluation is recommend small bowel wall thickening which may be of infectious or inflammatory etiology    # NSTEMI II Demand ischemia  - troponin 68-->74-->60  - c/w ASA 81mg     # LFTs  - likely 2/2 overload  -Improving LFts, continue to monitor   - RUQ if not improving    # mild chronic anemia  - Iron 33 borderline low. 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 Perez.    #[facility] [place]  DVT prophylaxis: Enoxaparin 40mg QD   Please contact me with any questions or concerns at [phone].   Assessment:  Patient is an 88-year-old Female with a pmhx HTN, aortic stenosis, DM, CAD, D-CHF presents for HFpEF exacerbation 2/2 AS    Problems discussed and associated plan:  #acute hypoxic resp failure   #HFpEF exaccerbation  - Continue  lasix iv 40 daily with ACE bandages on LE  - Strict I&O's, Daily weights, K >4 and mg >2 needs to be replated   - Pro-BNP: 31753  9/16   - Cr stable    # severe AS  - TAVR evaluation w/u  - f/u structural outpatient  - CT chest :b/l pleural effusions with adjacent atelectasis/consolidation of the lower lobes. There are bilateral lung groundglass opacities.  Several thoracic vertebral body compression deformities, which are new/increased of indeterminate age as well as new sclerosis seen in some of the thoracic vertebral bodies compared to CT 5/11/2023. Metastatic disease cannot be entirely excluded and further evaluation is recommend small bowel wall thickening which may be of infectious or inflammatory etiology    # NSTEMI II Demand ischemia  - troponin 68-->74-->60  - c/w ASA 81mg     # LFTs  - likely 2/2 overload  -Improving LFts, continue to monitor   - RUQ if not improving    # mild chronic anemia  - Iron 33 borderline low. 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 Perez.    # back pain  - Tylenol PRN  - Tramadol 25 mg X 1     #Misc  DVT prophylaxis: Enoxaparin 40mg QD   Please contact me with any questions or concerns at [phone].

## 2024-09-23 NOTE — PROGRESS NOTE ADULT - SUBJECTIVE AND OBJECTIVE BOX
Chief complaint: Patient is a 88y old  Female who presents with a chief complaint of shortness of breath (17 Sep 2024 14:05)    Hospital Course: 88F pmhx DM, HTN, aortic stenosis, DM, CAD, D-CHF presents for shortness of breath overnight. Patient was hospitalized last week with dyspnea and chest pain, underwent stress test which was positive. Patient then had left heart cath which identified single vessel disease and severe aortic stenosis.  Patient was supposed to follow up with Dr. España in a few days outpatient. Patient was on lasix during last admission but was not discharged home with it. Patient denies chest pain. She states that shortness of breath feels better after initial dose of lasix and oxygen administration. Patient also with increased swelling of lower extremities     Interval history: Pt was seen and examined at bedside in NAD. No overnight events    Review of systems: A complete 10-point review of systems was obtained and is negative except as stated in the interval history.    Vitals:  ICU Vital Signs Last 24 Hrs  T(C): 36.4 (22 Sep 2024 07:57), Max: 36.7 (21 Sep 2024 16:16)  T(F): 97.5 (22 Sep 2024 07:57), Max: 98 (21 Sep 2024 16:16)  HR: 68 (22 Sep 2024 07:57) (62 - 69)  BP: 151/69 (22 Sep 2024 07:57) (125/54 - 151/69)  BP(mean): 99 (22 Sep 2024 07:57) (78 - 99)  RR: 18 (22 Sep 2024 07:57) (18 - 19)  SpO2: 97% (22 Sep 2024 05:50) (96% - 97%)    O2 Parameters below as of 22 Sep 2024 05:50  Patient On (Oxygen Delivery Method): room air                Ins & outs:     09-16 @ 07:01  -  09-17 @ 07:00  --------------------------------------------------------  IN: 0 mL / OUT: 200 mL / NET: -200 mL    09-17 @ 07:01  -  09-18 @ 02:05  --------------------------------------------------------  IN: 0 mL / OUT: 1050 mL / NET: -1050 mL    18 Sep 2024 07:01  -  19 Sep 2024 07:00  --------------------------------------------------------  IN: 788 mL / OUT: 1950 mL / NET: -1162 mL    20 Sep 2024 07:01  -  20 Sep 2024 17:53  --------------------------------------------------------  IN: 0 mL / OUT: 900 mL / NET: -900 mL    21 Sep 2024 07:01  -  22 Sep 2024 07:00  --------------------------------------------------------  IN: 600 mL / OUT: 2175 mL / NET: -1575 mL    22 Sep 2024 07:01  -  22 Sep 2024 10:43  --------------------------------------------------------  IN: 0 mL / OUT: 200 mL / NET: -200 mL        Weight trend:  Weight (kg): 49.3 (09-16)    Physical exam:  General: No apparent distress  HEENT: Anicteric sclera. Moist mucous membranes.   Cardiac: Regular rate and rhythm. No murmurs, rubs, or gallops.   Vascular: Symmetric radial pulses. Dorsalis pedis pulses palpable.   Respiratory: Normal effort. b/l  decreased sounds  Abdomen: Soft, nontender. Audible bowel sounds.   Extremities: Warm with +1 b/L LE edema. No cyanosis or clubbing.   Skin: Warm and dry. No rash.   Neurologic: Grossly normal motor function.   Psychiatric: Oriented to person, place, and time       Data reviewed:  - Telemetry: NSR HR: 56-70bpm    - ECG 9/4/24: Diagnosis Line Sinus bradycardia. Left anterior fascicular block. Anteroseptal infarct , age undetermined. T wave abnormality, consider inferolateral ischemia. with 59bpm     - TTE 9/4/24:   1. Left ventricular ejection fraction, by visual estimation, is 50 to   55%.   2. Mildly decreased segmental left ventricular systolic function.   3. Mid and apical anterior septum, apical inferior segment, and apex are   abnormal as described above.   4. Mild left ventricular hypertrophy.   5. Normal right ventricular size and function.   6. Mild to moderately enlarged left atrium.   7. Mild mitral annular calcification.   8. Mild to moderate mitral valve regurgitation.   9. Moderate tricuspid regurgitation.  10. Mild aortic regurgitation.  11. Moderate to severe aortic valve stenosis.  12. Mild pulmonic valve regurgitation.  13. There is mild aortic root calcification.    - Chest x-ray 9/16/24:   Stable bilateral lung opacities  Prominence to the heart and mediastinum.  Further evaluation with a PA view the chest is recommended    - Stress test 9/4/24:   1.   Small to moderate size severe reversible defect in the inferolateral   wall and small reversible defect in the anteroseptal wall of the left   ventricle consistent with ischemia and suggesting ischemia in multiple   coronary territories.  2.  Dilated left ventricle with mild global hypokinesis. All walls of the   left ventricle thicken.  3.  Left ventricular ejection fraction calculated as 46% which is low.   Wall motion analysis suggests ejection fraction of 40-45%.   Echocardiographic ejection fraction by visual estimation 50-55% on   09/04/2024    - Cardiac catheterization:   9/9/24:   POST-OP DIAGNOSIS:    Severe single vessel disease  Severe AS  Coronary Dominance: Right  LM: short segment engaged  LAD: Medium Sized Vessel  pLAD: 85% lesion at distal third   mLAD: moderate atherosclerotic disease  dLAD: mild atherosclerotic disease  D1: severe ostial stenosis, small vessel  D2: minor luminal irregularities  CX: medium sized vessel  pLcx: minor luminal irregularities  dLcx: tortuous vessel with minor luminal irregularities  OM1: Large tortuous vessel, no significant disease  RCA: Medium sized, dominant vessel  pRCA: mild atherosclerotic disease  mRCA: mild atherosclerotic disease  dRCA: minor luminal irregularities  RPDA: no significant disease  RPL: no significant disease    LVEDP: 15 mmHg     EF: 50 % by Echo    PA % sat: 75.2%  SaO2 % sat: 98.5%    RA pressure: 9 mmHg  RV pressure: 40/13 mmHg  PA pressure: 44/17/25 mmHg  PCWP: 23/17/14 mmHg    CVP: 9 mmHg  CO/CI Jc: 3.24 L/min - 2.16 L/min/m2  PVR (woods units): 2.47 Woods units  SVR (Dynes.s/cm5):   1728 dynes.s/cm5    AV gradient: 40 mmHg (peak to peak gradient)  ALEX: 0.51 cm2 (by Hakki equation)          - Labs:             - Telemetry:  - ECG (date***):  - Echo (date***):  - Radiology:    - Labs:                        10.2   23.49 )-----------( 234      ( 22 Sep 2024 06:25 )             32.7     09-22    137  |  101  |  34[H]  ----------------------------<  96  4.7   |  27  |  0.5[L]    Ca    8.7      22 Sep 2024 06:25  Mg     2.1     09-22    TPro  6.0  /  Alb  3.2[L]  /  TBili  0.3  /  DBili  x   /  AST  37  /  ALT  69[H]  /  AlkPhos  181[H]  09-22    LIVER FUNCTIONS - ( 22 Sep 2024 06:25 )  Alb: 3.2 g/dL / Pro: 6.0 g/dL / ALK PHOS: 181 U/L / ALT: 69 U/L / AST: 37 U/L / GGT: x                     Lactate Trend    Urinalysis Basic - ( 22 Sep 2024 06:25 )    Color: x / Appearance: x / SG: x / pH: x  Gluc: 96 mg/dL / Ketone: x  / Bili: x / Urobili: x   Blood: x / Protein: x / Nitrite: x   Leuk Esterase: x / RBC: x / WBC x   Sq Epi: x / Non Sq Epi: x / Bacteria: x                        Medications:  MEDICATIONS  (STANDING):  aspirin enteric coated 81 milliGRAM(s) Oral daily  atorvastatin 20 milliGRAM(s) Oral at bedtime  carvedilol 3.125 milliGRAM(s) Oral every 12 hours  chlorhexidine 2% Cloths 1 Application(s) Topical daily  dextrose 5%. 1000 milliLiter(s) (50 mL/Hr) IV Continuous <Continuous>  dextrose 5%. 1000 milliLiter(s) (100 mL/Hr) IV Continuous <Continuous>  dextrose 50% Injectable 25 Gram(s) IV Push once  dextrose 50% Injectable 12.5 Gram(s) IV Push once  dextrose 50% Injectable 25 Gram(s) IV Push once  enoxaparin Injectable 40 milliGRAM(s) SubCutaneous every 24 hours  furosemide   Injectable 40 milliGRAM(s) IV Push daily  gabapentin 100 milliGRAM(s) Oral every 8 hours  glucagon  Injectable 1 milliGRAM(s) IntraMuscular once  insulin glargine Injectable (LANTUS) 10 Unit(s) SubCutaneous at bedtime  insulin lispro (ADMELOG) corrective regimen sliding scale   SubCutaneous three times a day before meals  iron sucrose IVPB 200 milliGRAM(s) IV Intermittent every 24 hours  losartan 50 milliGRAM(s) Oral every 12 hours  polyethylene glycol 3350 17 Gram(s) Oral two times a day  senna 2 Tablet(s) Oral at bedtime    MEDICATIONS  (PRN):  acetaminophen     Tablet .. 650 milliGRAM(s) Oral every 6 hours PRN Mild Pain (1 - 3)  dextrose Oral Gel 15 Gram(s) Oral once PRN Blood Glucose LESS THAN 70 milliGRAM(s)/deciliter    Allergies    No Known Drug Allergies  Alcohol (Hives; Angioedema)    Intolerances       Chief complaint: Patient is a 88y old  Female who presents with a chief complaint of shortness of breath (17 Sep 2024 14:05)    Hospital Course: 88F pmhx DM, HTN, aortic stenosis, DM, CAD, D-CHF presents for shortness of breath overnight. Patient was hospitalized last week with dyspnea and chest pain, underwent stress test which was positive. Patient then had left heart cath which identified single vessel disease and severe aortic stenosis.  Patient was supposed to follow up with Dr. España in a few days outpatient. Patient was on lasix during last admission but was not discharged home with it. Patient denies chest pain. She states that shortness of breath feels better after initial dose of lasix and oxygen administration. Patient also with increased swelling of lower extremities     Interval history: Pt was seen and examined at bedside in NAD. No overnight events Complaints of back pain.     Review of systems: A complete 10-point review of systems was obtained and is negative except as stated in the interval history.    Vitals:  ICU Vital Signs Last 24 Hrs  T(C): 36.4 (23 Sep 2024 07:25), Max: 36.7 (23 Sep 2024 00:01)  T(F): 97.5 (23 Sep 2024 07:25), Max: 98.1 (23 Sep 2024 00:01)  HR: 60 (23 Sep 2024 07:25) (60 - 68)  BP: 105/53 (23 Sep 2024 07:25) (105/53 - 140/63)  BP(mean): 76 (23 Sep 2024 07:25) (76 - 93)  O2 Parameters below as of 23 Sep 2024 07:25  Patient On (Oxygen Delivery Method): room air                  Ins & outs:     09-16 @ 07:01  -  09-17 @ 07:00  --------------------------------------------------------  IN: 0 mL / OUT: 200 mL / NET: -200 mL    09-17 @ 07:01  -  09-18 @ 02:05  --------------------------------------------------------  IN: 0 mL / OUT: 1050 mL / NET: -1050 mL    18 Sep 2024 07:01  -  19 Sep 2024 07:00  --------------------------------------------------------  IN: 788 mL / OUT: 1950 mL / NET: -1162 mL    20 Sep 2024 07:01  -  20 Sep 2024 17:53  --------------------------------------------------------  IN: 0 mL / OUT: 900 mL / NET: -900 mL    21 Sep 2024 07:01  -  22 Sep 2024 07:00  --------------------------------------------------------  IN: 600 mL / OUT: 2175 mL / NET: -1575 mL    22 Sep 2024 07:01  -  22 Sep 2024 10:43  --------------------------------------------------------  IN: 0 mL / OUT: 200 mL / NET: -200 mL    22 Sep 2024 07:01  -  23 Sep 2024 07:00  --------------------------------------------------------  IN: 577 mL / OUT: 2705 mL / NET: -2128 mL          Weight trend:  Weight (kg): 49.3 (09-16)    Physical exam:  General: No apparent distress  HEENT: Anicteric sclera. Moist mucous membranes.   Cardiac: Regular rate and rhythm. No murmurs, rubs, or gallops.   Vascular: Symmetric radial pulses. Dorsalis pedis pulses palpable.   Respiratory: Normal effort. b/l  decreased sounds  Abdomen: Soft, nontender. Audible bowel sounds.   Extremities: Warm with +1 b/L LE edema. No cyanosis or clubbing.   Skin: Warm and dry. No rash.   Neurologic: Grossly normal motor function.   Psychiatric: Oriented to person, place, and time       Data reviewed:  - Telemetry: NSR HR: 56-70bpm    - ECG 9/4/24: Diagnosis Line Sinus bradycardia. Left anterior fascicular block. Anteroseptal infarct , age undetermined. T wave abnormality, consider inferolateral ischemia. with 59bpm     - TTE 9/4/24:   1. Left ventricular ejection fraction, by visual estimation, is 50 to   55%.   2. Mildly decreased segmental left ventricular systolic function.   3. Mid and apical anterior septum, apical inferior segment, and apex are   abnormal as described above.   4. Mild left ventricular hypertrophy.   5. Normal right ventricular size and function.   6. Mild to moderately enlarged left atrium.   7. Mild mitral annular calcification.   8. Mild to moderate mitral valve regurgitation.   9. Moderate tricuspid regurgitation.  10. Mild aortic regurgitation.  11. Moderate to severe aortic valve stenosis.  12. Mild pulmonic valve regurgitation.  13. There is mild aortic root calcification.    - Chest x-ray 9/16/24:   Stable bilateral lung opacities  Prominence to the heart and mediastinum.  Further evaluation with a PA view the chest is recommended    - Stress test 9/4/24:   1.   Small to moderate size severe reversible defect in the inferolateral   wall and small reversible defect in the anteroseptal wall of the left   ventricle consistent with ischemia and suggesting ischemia in multiple   coronary territories.  2.  Dilated left ventricle with mild global hypokinesis. All walls of the   left ventricle thicken.  3.  Left ventricular ejection fraction calculated as 46% which is low.   Wall motion analysis suggests ejection fraction of 40-45%.   Echocardiographic ejection fraction by visual estimation 50-55% on   09/04/2024    - Cardiac catheterization:   9/9/24:   POST-OP DIAGNOSIS:    Severe single vessel disease  Severe AS  Coronary Dominance: Right  LM: short segment engaged  LAD: Medium Sized Vessel  pLAD: 85% lesion at distal third   mLAD: moderate atherosclerotic disease  dLAD: mild atherosclerotic disease  D1: severe ostial stenosis, small vessel  D2: minor luminal irregularities  CX: medium sized vessel  pLcx: minor luminal irregularities  dLcx: tortuous vessel with minor luminal irregularities  OM1: Large tortuous vessel, no significant disease  RCA: Medium sized, dominant vessel  pRCA: mild atherosclerotic disease  mRCA: mild atherosclerotic disease  dRCA: minor luminal irregularities  RPDA: no significant disease  RPL: no significant disease    LVEDP: 15 mmHg     EF: 50 % by Echo    PA % sat: 75.2%  SaO2 % sat: 98.5%    RA pressure: 9 mmHg  RV pressure: 40/13 mmHg  PA pressure: 44/17/25 mmHg  PCWP: 23/17/14 mmHg    CVP: 9 mmHg  CO/CI Jc: 3.24 L/min - 2.16 L/min/m2  PVR (woods units): 2.47 Woods units  SVR (Dynes.s/cm5):   1728 dynes.s/cm5    AV gradient: 40 mmHg (peak to peak gradient)  ALEX: 0.51 cm2 (by Hakki equation)            - Labs:                        10.6   27.15 )-----------( 217      ( 23 Sep 2024 05:19 )             31.6     09-23    134[L]  |  101  |  35[H]  ----------------------------<  161[H]  5.0   |  27  |  0.6[L]    Ca    8.8      23 Sep 2024 05:19  Mg     2.2     09-23    TPro  6.0  /  Alb  3.2[L]  /  TBili  0.3  /  DBili  x   /  AST  37  /  ALT  69[H]  /  AlkPhos  181[H]  09-22    LIVER FUNCTIONS - ( 22 Sep 2024 06:25 )  Alb: 3.2 g/dL / Pro: 6.0 g/dL / ALK PHOS: 181 U/L / ALT: 69 U/L / AST: 37 U/L / GGT: x                     Lactate Trend    Urinalysis Basic - ( 23 Sep 2024 05:19 )                          Medications:  MEDICATIONS  (STANDING):  aspirin enteric coated 81 milliGRAM(s) Oral daily  atorvastatin 20 milliGRAM(s) Oral at bedtime  carvedilol 3.125 milliGRAM(s) Oral every 12 hours  chlorhexidine 2% Cloths 1 Application(s) Topical daily  dextrose 5%. 1000 milliLiter(s) (50 mL/Hr) IV Continuous <Continuous>  dextrose 5%. 1000 milliLiter(s) (100 mL/Hr) IV Continuous <Continuous>  dextrose 50% Injectable 25 Gram(s) IV Push once  dextrose 50% Injectable 12.5 Gram(s) IV Push once  dextrose 50% Injectable 25 Gram(s) IV Push once  enoxaparin Injectable 40 milliGRAM(s) SubCutaneous every 24 hours  furosemide   Injectable 40 milliGRAM(s) IV Push daily  gabapentin 100 milliGRAM(s) Oral every 8 hours  glucagon  Injectable 1 milliGRAM(s) IntraMuscular once  insulin glargine Injectable (LANTUS) 10 Unit(s) SubCutaneous at bedtime  insulin lispro (ADMELOG) corrective regimen sliding scale   SubCutaneous three times a day before meals  iron sucrose IVPB 200 milliGRAM(s) IV Intermittent every 24 hours  losartan 50 milliGRAM(s) Oral every 12 hours  polyethylene glycol 3350 17 Gram(s) Oral two times a day  senna 2 Tablet(s) Oral at bedtime    MEDICATIONS  (PRN):  acetaminophen     Tablet .. 650 milliGRAM(s) Oral every 6 hours PRN Mild Pain (1 - 3)  dextrose Oral Gel 15 Gram(s) Oral once PRN Blood Glucose LESS THAN 70 milliGRAM(s)/deciliter    Allergies    No Known Drug Allergies  Alcohol (Hives; Angioedema)    Intolerances

## 2024-09-24 LAB
ANION GAP SERPL CALC-SCNC: 10 MMOL/L — SIGNIFICANT CHANGE UP (ref 7–14)
ANION GAP SERPL CALC-SCNC: 14 MMOL/L — SIGNIFICANT CHANGE UP (ref 7–14)
BASOPHILS # BLD AUTO: 0.09 K/UL — SIGNIFICANT CHANGE UP (ref 0–0.2)
BASOPHILS NFR BLD AUTO: 0.3 % — SIGNIFICANT CHANGE UP (ref 0–1)
BUN SERPL-MCNC: 35 MG/DL — HIGH (ref 10–20)
BUN SERPL-MCNC: 37 MG/DL — HIGH (ref 10–20)
CALCIUM SERPL-MCNC: 8.9 MG/DL — SIGNIFICANT CHANGE UP (ref 8.4–10.4)
CALCIUM SERPL-MCNC: 8.9 MG/DL — SIGNIFICANT CHANGE UP (ref 8.4–10.5)
CHLORIDE SERPL-SCNC: 101 MMOL/L — SIGNIFICANT CHANGE UP (ref 98–110)
CHLORIDE SERPL-SCNC: 98 MMOL/L — SIGNIFICANT CHANGE UP (ref 98–110)
CO2 SERPL-SCNC: 24 MMOL/L — SIGNIFICANT CHANGE UP (ref 17–32)
CO2 SERPL-SCNC: 26 MMOL/L — SIGNIFICANT CHANGE UP (ref 17–32)
CREAT SERPL-MCNC: 0.6 MG/DL — LOW (ref 0.7–1.5)
CREAT SERPL-MCNC: 0.6 MG/DL — LOW (ref 0.7–1.5)
EGFR: 87 ML/MIN/1.73M2 — SIGNIFICANT CHANGE UP
EGFR: 87 ML/MIN/1.73M2 — SIGNIFICANT CHANGE UP
EOSINOPHIL # BLD AUTO: 0.2 K/UL — SIGNIFICANT CHANGE UP (ref 0–0.7)
EOSINOPHIL NFR BLD AUTO: 0.7 % — SIGNIFICANT CHANGE UP (ref 0–8)
GLUCOSE BLDC GLUCOMTR-MCNC: 111 MG/DL — HIGH (ref 70–99)
GLUCOSE BLDC GLUCOMTR-MCNC: 173 MG/DL — HIGH (ref 70–99)
GLUCOSE BLDC GLUCOMTR-MCNC: 211 MG/DL — HIGH (ref 70–99)
GLUCOSE BLDC GLUCOMTR-MCNC: 212 MG/DL — HIGH (ref 70–99)
GLUCOSE SERPL-MCNC: 202 MG/DL — HIGH (ref 70–99)
GLUCOSE SERPL-MCNC: 61 MG/DL — LOW (ref 70–99)
HCT VFR BLD CALC: 35.1 % — LOW (ref 37–47)
HGB BLD-MCNC: 11.1 G/DL — LOW (ref 12–16)
IMM GRANULOCYTES NFR BLD AUTO: 0.2 % — SIGNIFICANT CHANGE UP (ref 0.1–0.3)
LYMPHOCYTES # BLD AUTO: 23.96 K/UL — HIGH (ref 1.2–3.4)
LYMPHOCYTES # BLD AUTO: 84.2 % — HIGH (ref 20.5–51.1)
MAGNESIUM SERPL-MCNC: 1.9 MG/DL — SIGNIFICANT CHANGE UP (ref 1.8–2.4)
MAGNESIUM SERPL-MCNC: 2.4 MG/DL — SIGNIFICANT CHANGE UP (ref 1.8–2.4)
MCHC RBC-ENTMCNC: 27.7 PG — SIGNIFICANT CHANGE UP (ref 27–31)
MCHC RBC-ENTMCNC: 31.6 G/DL — LOW (ref 32–37)
MCV RBC AUTO: 87.5 FL — SIGNIFICANT CHANGE UP (ref 81–99)
MONOCYTES # BLD AUTO: 0.93 K/UL — HIGH (ref 0.1–0.6)
MONOCYTES NFR BLD AUTO: 3.3 % — SIGNIFICANT CHANGE UP (ref 1.7–9.3)
NEUTROPHILS # BLD AUTO: 3.23 K/UL — SIGNIFICANT CHANGE UP (ref 1.4–6.5)
NEUTROPHILS NFR BLD AUTO: 11.3 % — LOW (ref 42.2–75.2)
NRBC # BLD: 0 /100 WBCS — SIGNIFICANT CHANGE UP (ref 0–0)
PLATELET # BLD AUTO: 214 K/UL — SIGNIFICANT CHANGE UP (ref 130–400)
PMV BLD: 9.8 FL — SIGNIFICANT CHANGE UP (ref 7.4–10.4)
POTASSIUM SERPL-MCNC: 4.8 MMOL/L — SIGNIFICANT CHANGE UP (ref 3.5–5)
POTASSIUM SERPL-MCNC: 5 MMOL/L — SIGNIFICANT CHANGE UP (ref 3.5–5)
POTASSIUM SERPL-SCNC: 4.8 MMOL/L — SIGNIFICANT CHANGE UP (ref 3.5–5)
POTASSIUM SERPL-SCNC: 5 MMOL/L — SIGNIFICANT CHANGE UP (ref 3.5–5)
RBC # BLD: 4.01 M/UL — LOW (ref 4.2–5.4)
RBC # FLD: 17.6 % — HIGH (ref 11.5–14.5)
SODIUM SERPL-SCNC: 134 MMOL/L — LOW (ref 135–146)
SODIUM SERPL-SCNC: 139 MMOL/L — SIGNIFICANT CHANGE UP (ref 135–146)
WBC # BLD: 28.46 K/UL — HIGH (ref 4.8–10.8)
WBC # FLD AUTO: 28.46 K/UL — HIGH (ref 4.8–10.8)

## 2024-09-24 PROCEDURE — 99233 SBSQ HOSP IP/OBS HIGH 50: CPT

## 2024-09-24 RX ORDER — TRAMADOL HYDROCHLORIDE 50 MG/1
25 TABLET, COATED ORAL DAILY
Refills: 0 | Status: DISCONTINUED | OUTPATIENT
Start: 2024-09-24 | End: 2024-09-25

## 2024-09-24 RX ORDER — FUROSEMIDE 10 MG/ML
40 INJECTION INTRAVENOUS DAILY
Refills: 0 | Status: DISCONTINUED | OUTPATIENT
Start: 2024-09-25 | End: 2024-09-25

## 2024-09-24 RX ORDER — FUROSEMIDE 10 MG/ML
40 INJECTION INTRAVENOUS
Refills: 0 | Status: DISCONTINUED | OUTPATIENT
Start: 2024-09-24 | End: 2024-09-24

## 2024-09-24 RX ORDER — MAGNESIUM SULFATE 500 MG/ML
2 VIAL (ML) INJECTION ONCE
Refills: 0 | Status: COMPLETED | OUTPATIENT
Start: 2024-09-24 | End: 2024-09-24

## 2024-09-24 RX ADMIN — ENOXAPARIN SODIUM 40 MILLIGRAM(S): 150 INJECTION SUBCUTANEOUS at 17:23

## 2024-09-24 RX ADMIN — LIDOCAINE 1 PATCH: 50 CREAM TOPICAL at 05:20

## 2024-09-24 RX ADMIN — TRAMADOL HYDROCHLORIDE 25 MILLIGRAM(S): 50 TABLET, COATED ORAL at 14:50

## 2024-09-24 RX ADMIN — FUROSEMIDE 40 MILLIGRAM(S): 10 INJECTION INTRAVENOUS at 13:39

## 2024-09-24 RX ADMIN — ATORVASTATIN CALCIUM 20 MILLIGRAM(S): 10 TABLET, FILM COATED ORAL at 21:39

## 2024-09-24 RX ADMIN — Medication 3.12 MILLIGRAM(S): at 06:18

## 2024-09-24 RX ADMIN — Medication 4: at 17:23

## 2024-09-24 RX ADMIN — Medication 3.12 MILLIGRAM(S): at 17:24

## 2024-09-24 RX ADMIN — Medication 650 MILLIGRAM(S): at 02:13

## 2024-09-24 RX ADMIN — Medication 81 MILLIGRAM(S): at 13:35

## 2024-09-24 RX ADMIN — CHLORHEXIDINE GLUCONATE ORAL RINSE 1 APPLICATION(S): 1.2 SOLUTION DENTAL at 13:35

## 2024-09-24 RX ADMIN — Medication 650 MILLIGRAM(S): at 01:29

## 2024-09-24 RX ADMIN — LOSARTAN POTASSIUM 50 MILLIGRAM(S): 100 TABLET, FILM COATED ORAL at 06:18

## 2024-09-24 RX ADMIN — GABAPENTIN 100 MILLIGRAM(S): 800 TABLET, FILM COATED ORAL at 13:39

## 2024-09-24 RX ADMIN — IRON SUCROSE 100 MILLIGRAM(S): 20 INJECTION, SOLUTION INTRAVENOUS at 17:24

## 2024-09-24 RX ADMIN — TRAMADOL HYDROCHLORIDE 25 MILLIGRAM(S): 50 TABLET, COATED ORAL at 13:38

## 2024-09-24 RX ADMIN — GABAPENTIN 100 MILLIGRAM(S): 800 TABLET, FILM COATED ORAL at 21:39

## 2024-09-24 RX ADMIN — GABAPENTIN 100 MILLIGRAM(S): 800 TABLET, FILM COATED ORAL at 06:18

## 2024-09-24 RX ADMIN — INSULIN GLARGINE 13 UNIT(S): 300 INJECTION, SOLUTION SUBCUTANEOUS at 21:42

## 2024-09-24 RX ADMIN — FUROSEMIDE 40 MILLIGRAM(S): 10 INJECTION INTRAVENOUS at 06:18

## 2024-09-24 RX ADMIN — Medication 25 GRAM(S): at 22:45

## 2024-09-24 RX ADMIN — LOSARTAN POTASSIUM 50 MILLIGRAM(S): 100 TABLET, FILM COATED ORAL at 17:23

## 2024-09-24 RX ADMIN — Medication 2: at 13:33

## 2024-09-24 NOTE — PROGRESS NOTE ADULT - ASSESSMENT
Assessment:  Patient is an 88-year-old Female with a pmhx HTN, aortic stenosis, DM, CAD, D-CHF presents for HFpEF exacerbation 2/2 AS    Problems discussed and associated plan:  #acute hypoxic resp failure   #HFpEF exaccerbation  - Continue  lasix iv 40 daily with ACE bandages on LE  - Strict I&O's, Daily weights, K >4 and mg >2 needs to be replated   - Pro-BNP: 66364  9/16   - Cr stable    # severe AS  - TAVR evaluation w/u  - f/u structural outpatient  - CT chest :b/l pleural effusions with adjacent atelectasis/consolidation of the lower lobes. There are bilateral lung groundglass opacities.  Several thoracic vertebral body compression deformities, which are new/increased of indeterminate age as well as new sclerosis seen in some of the thoracic vertebral bodies compared to CT 5/11/2023. Metastatic disease cannot be entirely excluded and further evaluation is recommend small bowel wall thickening which may be of infectious or inflammatory etiology    # NSTEMI II Demand ischemia  - troponin 68-->74-->60  - c/w ASA 81mg     # LFTs  - likely 2/2 overload  -Improving LFts, continue to monitor   - RUQ if not improving    # mild chronic anemia  - Iron 33 borderline low. 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 Perez.    # back pain  - Tylenol PRN  - Tramadol 25 mg X 1     #Misc  DVT prophylaxis: Enoxaparin 40mg QD   Please contact me with any questions or concerns at [phone].   Assessment:  Patient is an 88-year-old Female with a pmhx HTN, aortic stenosis, DM, CAD, D-CHF presents for HFpEF exacerbation 2/2 AS    Problems discussed and associated plan:  #acute hypoxic resp failure   #HFpEF exaccerbation  - Continue  lasix iv 40 daily  -Refuse  ACE bandages on LE  - Strict I&O's, Daily weights, K >4 and mg >2 needs to be replated   - Pro-BNP: 73627  9/16   - Cr stable    # severe AS  - TAVR evaluation w/u  - f/u structural outpatient  - CT chest :b/l pleural effusions with adjacent atelectasis/consolidation of the lower lobes. There are bilateral lung groundglass opacities.  Several thoracic vertebral body compression deformities, which are new/increased of indeterminate age as well as new sclerosis seen in some of the thoracic vertebral bodies compared to CT 5/11/2023. Metastatic disease cannot be entirely excluded and further evaluation is recommend small bowel wall thickening which may be of infectious or inflammatory etiology    # NSTEMI II Demand ischemia  - troponin 68-->74-->60  - c/w ASA 81mg     # LFTs  - likely 2/2 overload  -Improving LFts, continue to monitor   - RUQ if not improving    # mild chronic anemia  - Iron 33 borderline low. 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 Perez.    # back pain  - Tylenol PRN  - Tramadol 25 mg prn     #Misc  DVT prophylaxis: Enoxaparin 40mg QD   Please contact me with any questions or concerns at [phone].

## 2024-09-24 NOTE — PROGRESS NOTE ADULT - NS ATTEND AMEND GEN_ALL_CORE FT
Patient seen and examined. Pertinent labs, imaging and telemetry reviewed. I agree with the above:     Int#[id number]  -Patient with right rib pain. No abnormalities palpated.   -Will trial Tylenol and lidocaine patch.     89yo New Zealander-speaking F with moderate-severe aortic stenosis, HFpEF with apical hypokinesis, CAD with 80% stenosis in pLAD and 99% stenosis of otial D1 ([facility] on 9/09/24), HTN, DM, and CLL who was admitted to [facility] on 9/16/24 with volume overload and was transferred to [facility] for further work-up and management. CTA chest/abd/pelvis with challenging anatomy, moderate bilateral effusions, and "several thoracic vertebral body compression deformities... metastatic disease cannot be entirely excluded and further evaluation is recommended." Never smoker, had normal mammograms up to age 66, no history of colonoscopy. Since there are no gross masses on her CT studies, she is likely still a TAVR candidate. Will get CT chest noncontrast when euvolemic and plan to will follow-up with Dr. Murray as an outpatient once euvolemic.     Plan:   - Continue Lasix 40 mg IV BID  - Strict I/O  - BID BMP and Mg  - All inpatient studies performed, can repeat CT chest noncontrast when euvolemic  - Continue Lantus 10U nightly   - Structural Cardiology has seen the patient and spoke to her daughter on 9/20/24.
87yo Telugu-speaking F with moderate-severe aortic stenosis, HFpEF with apical hypokinesis, CAD with 80% stenosis in pLAD and 99% stenosis of otial D1 ([facility] on 9/09/24), HTN, DM, and CLL who was admitted to [facility] on 9/16/24 with volume overload and was transferred to [facility] for further work-up and management.     Plan:   - Continue Lasix 40 mg IV BID  - Strict I/O  - BID BMP and Mg  - CTA chest/abd/pelvis TAVR protocol  - Carotid duplex  - Dental evaluation for TAVR, please do not remove teeth at this time  - Pending hip x-ray as patient with recent fall and ecchymosis  - Will reduce Lantus to 10U and discontinue Lispro, as glucose was well-controlled on regimen during her prior hospitalization  - Plan to check IVC tomorrow
87yo Yakut-speaking F with moderate-severe aortic stenosis, HFpEF with apical hypokinesis, CAD with 80% stenosis in pLAD and 99% stenosis of otial D1 ([facility] on 9/09/24), HTN, DM, and CLL who was admitted to [facility] on 9/16/24 with volume overload and was transferred to [facility] for further work-up and management. CTA chest/abd/pelvis with challenging anatomy, moderate bilateral effusions, and "several thoracic vertebral body compression deformities... metastatic disease cannot be entirely excluded and further evaluation is recommended." Never smoker, had normal mammograms up to age 66, no history of colonoscopy. Since there are no gross masses on her CT studies, she is likely still a TAVR candidate. Will get CT chest noncontrast when euvolemic and plan to will follow-up with Dr. Murray as an outpatient once euvolemic.     Plan:   - Continue Lasix 40 mg IV BID  - Strict I/O  - BID BMP and Mg  - All inpatient studies performed, can repeat CT chest noncontrast when euvolemic  - Continue Lantus 10U nightly   - Structural Cardiology has seen the patient and spoke to her daughter on 9/20/24.
Patient seen and examined. Pertinent labs, imaging and telemetry reviewed. I agree with the above:     Patient seen with fluent Indonesian speaking ACP.   -Patient with right rib pain. No abnormalities palpated.   -Tylenol and lidocaine with minimal relief.   -IVC 1.4 cm with >50% collapse.     87yo Indonesian-speaking F with moderate-severe aortic stenosis, HFpEF with apical hypokinesis, CAD with 80% stenosis in pLAD and 99% stenosis of otial D1 ([facility] on 9/09/24), HTN, DM, and CLL who was admitted to [facility] on 9/16/24 with volume overload and was transferred to [facility] for further work-up and management. CTA chest/abd/pelvis with challenging anatomy, moderate bilateral effusions, and "several thoracic vertebral body compression deformities... metastatic disease cannot be entirely excluded and further evaluation is recommended." Never smoker, had normal mammograms up to age 66, no history of colonoscopy. Since there are no gross masses on her CT studies, she is likely still a TAVR candidate. Will get CT chest noncontrast when euvolemic and plan to will follow-up with Dr. Murray as an outpatient once euvolemic.     Plan:   - Continue Lasix 40 mg IV BID  -May be able to change to PO tomorrow.   - Strict I/O  - BID BMP and Mg  - All inpatient studies performed, can repeat CT chest noncontrast when euvolemic  - Continue Lantus 10U nightly   - Structural Cardiology has seen the patient and spoke to her daughter on 9/20/24.  -Continue with pain control.
Patient seen and examined. Pertinent labs, imaging and telemetry reviewed. I agree with the above:     Patient seen with fluent Maltese speaking ACP.   -Patient with right rib pain. No abnormalities palpated.   -Tylenol and lidocaine with minimal relief.     87yo Maltese-speaking F with moderate-severe aortic stenosis, HFpEF with apical hypokinesis, CAD with 80% stenosis in pLAD and 99% stenosis of otial D1 ([facility] on 9/09/24), HTN, DM, and CLL who was admitted to [facility] on 9/16/24 with volume overload and was transferred to [facility] for further work-up and management. CTA chest/abd/pelvis with challenging anatomy, moderate bilateral effusions, and "several thoracic vertebral body compression deformities... metastatic disease cannot be entirely excluded and further evaluation is recommended." Never smoker, had normal mammograms up to age 66, no history of colonoscopy. Since there are no gross masses on her CT studies, she is likely still a TAVR candidate. Will get CT chest noncontrast when euvolemic and plan to will follow-up with Dr. Murray as an outpatient once euvolemic.     Plan:   - Continue Lasix 40 mg IV BID  - Strict I/O  - BID BMP and Mg  - All inpatient studies performed, can repeat CT chest noncontrast when euvolemic  - Continue Lantus 10U nightly   - Structural Cardiology has seen the patient and spoke to her daughter on 9/20/24.  -Continue with pain control.
89yo Maori-speaking F with moderate-severe aortic stenosis, HFpEF with apical hypokinesis, CAD with 80% stenosis in pLAD and 99% stenosis of otial D1 ([facility] on 9/09/24), HTN, DM, and CLL who was admitted to [facility] on 9/16/24 with volume overload and was transferred to [facility] for further work-up and management.     Plan:   - Continue Lasix 40 mg IV BID  - Strict I/O  - BID BMP and Mg  - CTA chest/abd/pelvis TAVR protocol  - Carotid duplex complete, read pending  - Seen by Dental  - Continue Lantus 10U nightly   - Structural Cardiology consulted
87yo Frisian-speaking F with moderate-severe aortic stenosis, HFpEF with apical hypokinesis, CAD with 80% stenosis in pLAD and 99% stenosis of otial D1 ([facility] on 9/09/24), HTN, DM, and CLL who was admitted to [facility] on 9/16/24 with volume overload and was transferred to [facility] for further work-up and management. CTA chest/abd/pelvis with challenging anatomy, moderate bilateral effusions, and "several thoracic vertebral body compression deformities... metastatic disease cannot be entirely excluded and further evaluation is recommended." Never smoker, had normal mammograms up to age 66, no history of colonoscopy. Since there are no gross masses on her CT studies, she is likely still a TAVR candidate. Will get CT chest noncontrast when euvolemic and plan to will follow-up with Dr. Murray as an outpatient once euvolemic.     Plan:   - Continue Lasix 40 mg IV BID  - Strict I/O  - BID BMP and Mg  - All inpatient studies performed, can repeat CT chest noncontrast when euvolemic  - Continue Lantus 10U nightly   - Structural Cardiology has seen the patient and spoke to her daughter on 9/20/24

## 2024-09-24 NOTE — PROGRESS NOTE ADULT - SUBJECTIVE AND OBJECTIVE BOX
Chief complaint: Patient is a 88y old  Female who presents with a chief complaint of shortness of breath (17 Sep 2024 14:05)    Hospital Course: 88F pmhx DM, HTN, aortic stenosis, DM, CAD, D-CHF presents for shortness of breath overnight. Patient was hospitalized last week with dyspnea and chest pain, underwent stress test which was positive. Patient then had left heart cath which identified single vessel disease and severe aortic stenosis.  Patient was supposed to follow up with Dr. España in a few days outpatient. Patient was on lasix during last admission but was not discharged home with it. Patient denies chest pain. She states that shortness of breath feels better after initial dose of lasix and oxygen administration. Patient also with increased swelling of lower extremities     Interval history: Pt was seen and examined at bedside in NAD. No overnight events Complaints of back pain.     Review of systems: A complete 10-point review of systems was obtained and is negative except as stated in the interval history.    Vitals:  ICU Vital Signs Last 24 Hrs  T(C): 36.4 (23 Sep 2024 07:25), Max: 36.7 (23 Sep 2024 00:01)  T(F): 97.5 (23 Sep 2024 07:25), Max: 98.1 (23 Sep 2024 00:01)  HR: 60 (23 Sep 2024 07:25) (60 - 68)  BP: 105/53 (23 Sep 2024 07:25) (105/53 - 140/63)  BP(mean): 76 (23 Sep 2024 07:25) (76 - 93)  O2 Parameters below as of 23 Sep 2024 07:25  Patient On (Oxygen Delivery Method): room air                  Ins & outs:     09-16 @ 07:01  -  09-17 @ 07:00  --------------------------------------------------------  IN: 0 mL / OUT: 200 mL / NET: -200 mL    09-17 @ 07:01  -  09-18 @ 02:05  --------------------------------------------------------  IN: 0 mL / OUT: 1050 mL / NET: -1050 mL    18 Sep 2024 07:01  -  19 Sep 2024 07:00  --------------------------------------------------------  IN: 788 mL / OUT: 1950 mL / NET: -1162 mL    20 Sep 2024 07:01  -  20 Sep 2024 17:53  --------------------------------------------------------  IN: 0 mL / OUT: 900 mL / NET: -900 mL    21 Sep 2024 07:01  -  22 Sep 2024 07:00  --------------------------------------------------------  IN: 600 mL / OUT: 2175 mL / NET: -1575 mL    22 Sep 2024 07:01  -  22 Sep 2024 10:43  --------------------------------------------------------  IN: 0 mL / OUT: 200 mL / NET: -200 mL    22 Sep 2024 07:01  -  23 Sep 2024 07:00  --------------------------------------------------------  IN: 577 mL / OUT: 2705 mL / NET: -2128 mL          Weight trend:  Weight (kg): 49.3 (09-16)    Physical exam:  General: No apparent distress  HEENT: Anicteric sclera. Moist mucous membranes.   Cardiac: Regular rate and rhythm. No murmurs, rubs, or gallops.   Vascular: Symmetric radial pulses. Dorsalis pedis pulses palpable.   Respiratory: Normal effort. b/l  decreased sounds  Abdomen: Soft, nontender. Audible bowel sounds.   Extremities: Warm with +1 b/L LE edema. No cyanosis or clubbing.   Skin: Warm and dry. No rash.   Neurologic: Grossly normal motor function.   Psychiatric: Oriented to person, place, and time       Data reviewed:  - Telemetry: NSR HR: 56-70bpm    - ECG 9/4/24: Diagnosis Line Sinus bradycardia. Left anterior fascicular block. Anteroseptal infarct , age undetermined. T wave abnormality, consider inferolateral ischemia. with 59bpm     - TTE 9/4/24:   1. Left ventricular ejection fraction, by visual estimation, is 50 to   55%.   2. Mildly decreased segmental left ventricular systolic function.   3. Mid and apical anterior septum, apical inferior segment, and apex are   abnormal as described above.   4. Mild left ventricular hypertrophy.   5. Normal right ventricular size and function.   6. Mild to moderately enlarged left atrium.   7. Mild mitral annular calcification.   8. Mild to moderate mitral valve regurgitation.   9. Moderate tricuspid regurgitation.  10. Mild aortic regurgitation.  11. Moderate to severe aortic valve stenosis.  12. Mild pulmonic valve regurgitation.  13. There is mild aortic root calcification.    - Chest x-ray 9/16/24:   Stable bilateral lung opacities  Prominence to the heart and mediastinum.  Further evaluation with a PA view the chest is recommended    - Stress test 9/4/24:   1.   Small to moderate size severe reversible defect in the inferolateral   wall and small reversible defect in the anteroseptal wall of the left   ventricle consistent with ischemia and suggesting ischemia in multiple   coronary territories.  2.  Dilated left ventricle with mild global hypokinesis. All walls of the   left ventricle thicken.  3.  Left ventricular ejection fraction calculated as 46% which is low.   Wall motion analysis suggests ejection fraction of 40-45%.   Echocardiographic ejection fraction by visual estimation 50-55% on   09/04/2024    - Cardiac catheterization:   9/9/24:   POST-OP DIAGNOSIS:    Severe single vessel disease  Severe AS  Coronary Dominance: Right  LM: short segment engaged  LAD: Medium Sized Vessel  pLAD: 85% lesion at distal third   mLAD: moderate atherosclerotic disease  dLAD: mild atherosclerotic disease  D1: severe ostial stenosis, small vessel  D2: minor luminal irregularities  CX: medium sized vessel  pLcx: minor luminal irregularities  dLcx: tortuous vessel with minor luminal irregularities  OM1: Large tortuous vessel, no significant disease  RCA: Medium sized, dominant vessel  pRCA: mild atherosclerotic disease  mRCA: mild atherosclerotic disease  dRCA: minor luminal irregularities  RPDA: no significant disease  RPL: no significant disease    LVEDP: 15 mmHg     EF: 50 % by Echo    PA % sat: 75.2%  SaO2 % sat: 98.5%    RA pressure: 9 mmHg  RV pressure: 40/13 mmHg  PA pressure: 44/17/25 mmHg  PCWP: 23/17/14 mmHg    CVP: 9 mmHg  CO/CI Jc: 3.24 L/min - 2.16 L/min/m2  PVR (woods units): 2.47 Woods units  SVR (Dynes.s/cm5):   1728 dynes.s/cm5    AV gradient: 40 mmHg (peak to peak gradient)  ALEX: 0.51 cm2 (by Hakki equation)            - Labs:                        10.6   27.15 )-----------( 217      ( 23 Sep 2024 05:19 )             31.6     09-23    134[L]  |  101  |  35[H]  ----------------------------<  161[H]  5.0   |  27  |  0.6[L]    Ca    8.8      23 Sep 2024 05:19  Mg     2.2     09-23    TPro  6.0  /  Alb  3.2[L]  /  TBili  0.3  /  DBili  x   /  AST  37  /  ALT  69[H]  /  AlkPhos  181[H]  09-22    LIVER FUNCTIONS - ( 22 Sep 2024 06:25 )  Alb: 3.2 g/dL / Pro: 6.0 g/dL / ALK PHOS: 181 U/L / ALT: 69 U/L / AST: 37 U/L / GGT: x                     Lactate Trend    Urinalysis Basic - ( 23 Sep 2024 05:19 )                          Medications:  MEDICATIONS  (STANDING):  aspirin enteric coated 81 milliGRAM(s) Oral daily  atorvastatin 20 milliGRAM(s) Oral at bedtime  carvedilol 3.125 milliGRAM(s) Oral every 12 hours  chlorhexidine 2% Cloths 1 Application(s) Topical daily  dextrose 5%. 1000 milliLiter(s) (50 mL/Hr) IV Continuous <Continuous>  dextrose 5%. 1000 milliLiter(s) (100 mL/Hr) IV Continuous <Continuous>  dextrose 50% Injectable 25 Gram(s) IV Push once  dextrose 50% Injectable 12.5 Gram(s) IV Push once  dextrose 50% Injectable 25 Gram(s) IV Push once  enoxaparin Injectable 40 milliGRAM(s) SubCutaneous every 24 hours  furosemide   Injectable 40 milliGRAM(s) IV Push daily  gabapentin 100 milliGRAM(s) Oral every 8 hours  glucagon  Injectable 1 milliGRAM(s) IntraMuscular once  insulin glargine Injectable (LANTUS) 10 Unit(s) SubCutaneous at bedtime  insulin lispro (ADMELOG) corrective regimen sliding scale   SubCutaneous three times a day before meals  iron sucrose IVPB 200 milliGRAM(s) IV Intermittent every 24 hours  losartan 50 milliGRAM(s) Oral every 12 hours  polyethylene glycol 3350 17 Gram(s) Oral two times a day  senna 2 Tablet(s) Oral at bedtime    MEDICATIONS  (PRN):  acetaminophen     Tablet .. 650 milliGRAM(s) Oral every 6 hours PRN Mild Pain (1 - 3)  dextrose Oral Gel 15 Gram(s) Oral once PRN Blood Glucose LESS THAN 70 milliGRAM(s)/deciliter    Allergies    No Known Drug Allergies  Alcohol (Hives; Angioedema)    Intolerances       Chief complaint: Patient is a 88y old  Female who presents with a chief complaint of shortness of breath (17 Sep 2024 14:05)    Hospital Course: 88F pmhx DM, HTN, aortic stenosis, DM, CAD, D-CHF presents for shortness of breath overnight. Patient was hospitalized last week with dyspnea and chest pain, underwent stress test which was positive. Patient then had left heart cath which identified single vessel disease and severe aortic stenosis.  Patient was supposed to follow up with Dr. España in a few days outpatient. Patient was on lasix during last admission but was not discharged home with it. Patient denies chest pain. She states that shortness of breath feels better after initial dose of lasix and oxygen administration. Patient also with increased swelling of lower extremities     Interval history: Pt was seen and examined at bedside in NAD. No overnight events Complaints of back pain.     Review of systems: A complete 10-point review of systems was obtained and is negative except as stated in the interval history.    Vitals:  ICU Vital Signs Last 24 Hrs  T(C): 36.4 (24 Sep 2024 07:57), Max: 36.6 (23 Sep 2024 23:57)  T(F): 97.5 (24 Sep 2024 07:57), Max: 97.8 (23 Sep 2024 23:57)  HR: 67 (24 Sep 2024 13:38) (62 - 67)  BP: 121/57 (24 Sep 2024 13:38) (121/57 - 143/66)  BP(mean): 82 (24 Sep 2024 13:38) (82 - 95)  RR: 18 (24 Sep 2024 07:57) (18 - 19)  SpO2: 98% (24 Sep 2024 04:49) (95% - 98%)    O2 Parameters below as of 24 Sep 2024 04:49  Patient On (Oxygen Delivery Method): room air        ir                  Ins & outs:     09-16 @ 07:01  -  09-17 @ 07:00  --------------------------------------------------------  IN: 0 mL / OUT: 200 mL / NET: -200 mL    09-17 @ 07:01  -  09-18 @ 02:05  --------------------------------------------------------  IN: 0 mL / OUT: 1050 mL / NET: -1050 mL    18 Sep 2024 07:01  -  19 Sep 2024 07:00  --------------------------------------------------------  IN: 788 mL / OUT: 1950 mL / NET: -1162 mL    20 Sep 2024 07:01  -  20 Sep 2024 17:53  --------------------------------------------------------  IN: 0 mL / OUT: 900 mL / NET: -900 mL    21 Sep 2024 07:01  -  22 Sep 2024 07:00  --------------------------------------------------------  IN: 600 mL / OUT: 2175 mL / NET: -1575 mL    22 Sep 2024 07:01  -  22 Sep 2024 10:43  --------------------------------------------------------  IN: 0 mL / OUT: 200 mL / NET: -200 mL    22 Sep 2024 07:01  -  23 Sep 2024 07:00  --------------------------------------------------------  IN: 577 mL / OUT: 2705 mL / NET: -2128 mL    I&O's Summary    23 Sep 2024 07:01  -  24 Sep 2024 07:00  --------------------------------------------------------  IN: 1060 mL / OUT: 1400 mL / NET: -340 mL    24 Sep 2024 07:01  -  24 Sep 2024 16:06  --------------------------------------------------------  IN: 220 mL / OUT: 600 mL / NET: -380 mL          Weight trend:  Weight (kg): 49.3 (09-16)    Physical exam:  General: No apparent distress  HEENT: Anicteric sclera. Moist mucous membranes.   Cardiac: Regular rate and rhythm. No murmurs, rubs, or gallops.   Vascular: Symmetric radial pulses. Dorsalis pedis pulses palpable.   Respiratory: Normal effort. b/l  decreased sounds  Abdomen: Soft, nontender. Audible bowel sounds.   Extremities: Warm with +1 b/L LE edema. No cyanosis or clubbing.   Skin: Warm and dry. No rash.   Neurologic: Grossly normal motor function.   Psychiatric: Oriented to person, place, and time       Data reviewed:  - Telemetry: NSR HR: 56-70bpm    - ECG 9/4/24: Diagnosis Line Sinus bradycardia. Left anterior fascicular block. Anteroseptal infarct , age undetermined. T wave abnormality, consider inferolateral ischemia. with 59bpm     - TTE 9/4/24:   1. Left ventricular ejection fraction, by visual estimation, is 50 to   55%.   2. Mildly decreased segmental left ventricular systolic function.   3. Mid and apical anterior septum, apical inferior segment, and apex are   abnormal as described above.   4. Mild left ventricular hypertrophy.   5. Normal right ventricular size and function.   6. Mild to moderately enlarged left atrium.   7. Mild mitral annular calcification.   8. Mild to moderate mitral valve regurgitation.   9. Moderate tricuspid regurgitation.  10. Mild aortic regurgitation.  11. Moderate to severe aortic valve stenosis.  12. Mild pulmonic valve regurgitation.  13. There is mild aortic root calcification.    - Chest x-ray 9/16/24:   Stable bilateral lung opacities  Prominence to the heart and mediastinum.  Further evaluation with a PA view the chest is recommended    - Stress test 9/4/24:   1.   Small to moderate size severe reversible defect in the inferolateral   wall and small reversible defect in the anteroseptal wall of the left   ventricle consistent with ischemia and suggesting ischemia in multiple   coronary territories.  2.  Dilated left ventricle with mild global hypokinesis. All walls of the   left ventricle thicken.  3.  Left ventricular ejection fraction calculated as 46% which is low.   Wall motion analysis suggests ejection fraction of 40-45%.   Echocardiographic ejection fraction by visual estimation 50-55% on   09/04/2024    - Cardiac catheterization:   9/9/24:   POST-OP DIAGNOSIS:    Severe single vessel disease  Severe AS  Coronary Dominance: Right  LM: short segment engaged  LAD: Medium Sized Vessel  pLAD: 85% lesion at distal third   mLAD: moderate atherosclerotic disease  dLAD: mild atherosclerotic disease  D1: severe ostial stenosis, small vessel  D2: minor luminal irregularities  CX: medium sized vessel  pLcx: minor luminal irregularities  dLcx: tortuous vessel with minor luminal irregularities  OM1: Large tortuous vessel, no significant disease  RCA: Medium sized, dominant vessel  pRCA: mild atherosclerotic disease  mRCA: mild atherosclerotic disease  dRCA: minor luminal irregularities  RPDA: no significant disease  RPL: no significant disease    LVEDP: 15 mmHg     EF: 50 % by Echo    PA % sat: 75.2%  SaO2 % sat: 98.5%    RA pressure: 9 mmHg  RV pressure: 40/13 mmHg  PA pressure: 44/17/25 mmHg  PCWP: 23/17/14 mmHg    CVP: 9 mmHg  CO/CI Jc: 3.24 L/min - 2.16 L/min/m2  PVR (woods units): 2.47 Woods units  SVR (Dynes.s/cm5):   1728 dynes.s/cm5    AV gradient: 40 mmHg (peak to peak gradient)  ALEX: 0.51 cm2 (by Hakki equation)            - Labs:                        11.1   28.46 )-----------( 214      ( 24 Sep 2024 06:02 )             35.1     09-24    139  |  101  |  37[H]  ----------------------------<  61[L]  5.0   |  24  |  0.6[L]    Ca    8.9      24 Sep 2024 06:02  Mg     2.4     09-24                  Lactate Trend    Urinalysis Basic - ( 24 Sep 2024 06:02 )    Color: x / Appearance: x / SG: x / pH: x  Gluc: 61 mg/dL / Ketone: x  / Bili: x / Urobili: x   Blood: x / Protein: x / Nitrite: x   Leuk Esterase: x / RBC: x / WBC x   Sq Epi: x / Non Sq Epi: x / Bacteria: x                            Medications:  MEDICATIONS  (STANDING):  aspirin enteric coated 81 milliGRAM(s) Oral daily  atorvastatin 20 milliGRAM(s) Oral at bedtime  carvedilol 3.125 milliGRAM(s) Oral every 12 hours  chlorhexidine 2% Cloths 1 Application(s) Topical daily  dextrose 5%. 1000 milliLiter(s) (50 mL/Hr) IV Continuous <Continuous>  dextrose 5%. 1000 milliLiter(s) (100 mL/Hr) IV Continuous <Continuous>  dextrose 50% Injectable 25 Gram(s) IV Push once  dextrose 50% Injectable 12.5 Gram(s) IV Push once  dextrose 50% Injectable 25 Gram(s) IV Push once  enoxaparin Injectable 40 milliGRAM(s) SubCutaneous every 24 hours  furosemide   Injectable 40 milliGRAM(s) IV Push daily  gabapentin 100 milliGRAM(s) Oral every 8 hours  glucagon  Injectable 1 milliGRAM(s) IntraMuscular once  insulin glargine Injectable (LANTUS) 10 Unit(s) SubCutaneous at bedtime  insulin lispro (ADMELOG) corrective regimen sliding scale   SubCutaneous three times a day before meals  iron sucrose IVPB 200 milliGRAM(s) IV Intermittent every 24 hours  losartan 50 milliGRAM(s) Oral every 12 hours  polyethylene glycol 3350 17 Gram(s) Oral two times a day  senna 2 Tablet(s) Oral at bedtime    MEDICATIONS  (PRN):  acetaminophen     Tablet .. 650 milliGRAM(s) Oral every 6 hours PRN Mild Pain (1 - 3)  dextrose Oral Gel 15 Gram(s) Oral once PRN Blood Glucose LESS THAN 70 milliGRAM(s)/deciliter    Allergies    No Known Drug Allergies  Alcohol (Hives; Angioedema)    Intolerances

## 2024-09-25 ENCOUNTER — TRANSCRIPTION ENCOUNTER (OUTPATIENT)
Age: 89
End: 2024-09-25

## 2024-09-25 VITALS — DIASTOLIC BLOOD PRESSURE: 63 MMHG | TEMPERATURE: 98 F | SYSTOLIC BLOOD PRESSURE: 132 MMHG | HEART RATE: 62 BPM

## 2024-09-25 PROBLEM — Q24.8 OTHER SPECIFIED CONGENITAL MALFORMATIONS OF HEART: Chronic | Status: ACTIVE | Noted: 2024-09-16

## 2024-09-25 LAB
ANION GAP SERPL CALC-SCNC: 9 MMOL/L — SIGNIFICANT CHANGE UP (ref 7–14)
BUN SERPL-MCNC: 29 MG/DL — HIGH (ref 10–20)
CALCIUM SERPL-MCNC: 8.9 MG/DL — SIGNIFICANT CHANGE UP (ref 8.4–10.4)
CHLORIDE SERPL-SCNC: 100 MMOL/L — SIGNIFICANT CHANGE UP (ref 98–110)
CO2 SERPL-SCNC: 27 MMOL/L — SIGNIFICANT CHANGE UP (ref 17–32)
CREAT SERPL-MCNC: 0.5 MG/DL — LOW (ref 0.7–1.5)
EGFR: 91 ML/MIN/1.73M2 — SIGNIFICANT CHANGE UP
GLUCOSE BLDC GLUCOMTR-MCNC: 155 MG/DL — HIGH (ref 70–99)
GLUCOSE BLDC GLUCOMTR-MCNC: 197 MG/DL — HIGH (ref 70–99)
GLUCOSE BLDC GLUCOMTR-MCNC: 227 MG/DL — HIGH (ref 70–99)
GLUCOSE BLDC GLUCOMTR-MCNC: 65 MG/DL — LOW (ref 70–99)
GLUCOSE SERPL-MCNC: 71 MG/DL — SIGNIFICANT CHANGE UP (ref 70–99)
HCT VFR BLD CALC: 37.4 % — SIGNIFICANT CHANGE UP (ref 37–47)
HGB BLD-MCNC: 11.7 G/DL — LOW (ref 12–16)
MAGNESIUM SERPL-MCNC: 2 MG/DL — SIGNIFICANT CHANGE UP (ref 1.8–2.4)
MCHC RBC-ENTMCNC: 27.3 PG — SIGNIFICANT CHANGE UP (ref 27–31)
MCHC RBC-ENTMCNC: 31.3 G/DL — LOW (ref 32–37)
MCV RBC AUTO: 87.4 FL — SIGNIFICANT CHANGE UP (ref 81–99)
NRBC # BLD: 0 /100 WBCS — SIGNIFICANT CHANGE UP (ref 0–0)
PLATELET # BLD AUTO: 202 K/UL — SIGNIFICANT CHANGE UP (ref 130–400)
PMV BLD: 9.3 FL — SIGNIFICANT CHANGE UP (ref 7.4–10.4)
POTASSIUM SERPL-MCNC: 4.5 MMOL/L — SIGNIFICANT CHANGE UP (ref 3.5–5)
POTASSIUM SERPL-SCNC: 4.5 MMOL/L — SIGNIFICANT CHANGE UP (ref 3.5–5)
RBC # BLD: 4.28 M/UL — SIGNIFICANT CHANGE UP (ref 4.2–5.4)
RBC # FLD: 17.9 % — HIGH (ref 11.5–14.5)
SODIUM SERPL-SCNC: 136 MMOL/L — SIGNIFICANT CHANGE UP (ref 135–146)
WBC # BLD: 22.89 K/UL — HIGH (ref 4.8–10.8)
WBC # FLD AUTO: 22.89 K/UL — HIGH (ref 4.8–10.8)

## 2024-09-25 PROCEDURE — 99239 HOSP IP/OBS DSCHRG MGMT >30: CPT

## 2024-09-25 RX ORDER — INSULIN GLARGINE 300 U/ML
13 INJECTION, SOLUTION SUBCUTANEOUS
Qty: 1 | Refills: 1
Start: 2024-09-25 | End: 2024-11-23

## 2024-09-25 RX ORDER — FUROSEMIDE 10 MG/ML
1 INJECTION INTRAVENOUS
Qty: 30 | Refills: 1
Start: 2024-09-25 | End: 2024-11-23

## 2024-09-25 RX ORDER — INSULIN GLARGINE 100 [IU]/ML
10 INJECTION, SOLUTION SUBCUTANEOUS
Refills: 0 | DISCHARGE

## 2024-09-25 RX ORDER — TRAMADOL HYDROCHLORIDE 50 MG/1
0.5 TABLET, COATED ORAL
Qty: 0 | Refills: 0 | DISCHARGE
Start: 2024-09-25

## 2024-09-25 RX ORDER — LIDOCAINE 50 MG/G
1 CREAM TOPICAL
Qty: 1 | Refills: 0
Start: 2024-09-25 | End: 2024-10-24

## 2024-09-25 RX ORDER — GABAPENTIN 800 MG/1
1 TABLET, FILM COATED ORAL
Qty: 90 | Refills: 0
Start: 2024-09-25 | End: 2024-10-24

## 2024-09-25 RX ADMIN — Medication 3.12 MILLIGRAM(S): at 06:07

## 2024-09-25 RX ADMIN — FUROSEMIDE 40 MILLIGRAM(S): 10 INJECTION INTRAVENOUS at 06:07

## 2024-09-25 RX ADMIN — LOSARTAN POTASSIUM 50 MILLIGRAM(S): 100 TABLET, FILM COATED ORAL at 17:17

## 2024-09-25 RX ADMIN — Medication 81 MILLIGRAM(S): at 11:08

## 2024-09-25 RX ADMIN — GABAPENTIN 100 MILLIGRAM(S): 800 TABLET, FILM COATED ORAL at 14:47

## 2024-09-25 RX ADMIN — TRAMADOL HYDROCHLORIDE 25 MILLIGRAM(S): 50 TABLET, COATED ORAL at 06:08

## 2024-09-25 RX ADMIN — ENOXAPARIN SODIUM 40 MILLIGRAM(S): 150 INJECTION SUBCUTANEOUS at 17:18

## 2024-09-25 RX ADMIN — IRON SUCROSE 100 MILLIGRAM(S): 20 INJECTION, SOLUTION INTRAVENOUS at 17:24

## 2024-09-25 RX ADMIN — LOSARTAN POTASSIUM 50 MILLIGRAM(S): 100 TABLET, FILM COATED ORAL at 06:07

## 2024-09-25 RX ADMIN — Medication 3.12 MILLIGRAM(S): at 17:17

## 2024-09-25 RX ADMIN — CHLORHEXIDINE GLUCONATE ORAL RINSE 1 APPLICATION(S): 1.2 SOLUTION DENTAL at 11:08

## 2024-09-25 RX ADMIN — GABAPENTIN 100 MILLIGRAM(S): 800 TABLET, FILM COATED ORAL at 06:07

## 2024-09-25 NOTE — DISCHARGE NOTE PROVIDER - NSDCMRMEDTOKEN_GEN_ALL_CORE_FT
acarbose 50 mg oral tablet: 1 tab(s) orally 3 times a day  aspirin 81 mg oral delayed release tablet: 1 tab(s) orally once a day  atorvastatin 20 mg oral tablet: 1 tab(s) orally once a day   carvedilol 3.125 mg oral tablet: 1 tab(s) orally 2 times a day  furosemide 40 mg oral tablet: 1 tab(s) orally once a day  gabapentin 100 mg oral capsule: 1 cap(s) orally every 8 hours  insulin glargine 100 units/mL subcutaneous solution: 13 unit(s) subcutaneous once a day (at bedtime)  Janumet 50 mg-500 mg oral tablet: 1 tab(s) orally once a day  Jardiance 25 mg oral tablet: 1 tab(s) orally once a day  lidocaine 4% topical film: Apply topically to affected area once a day  losartan 50 mg oral tablet: 1 tab(s) orally 2 times a day  traMADol 50 mg oral tablet: 0.5 tab(s) orally once a day As needed Moderate Pain (4 - 6)

## 2024-09-25 NOTE — DISCHARGE NOTE PROVIDER - NSDCCPCAREPLAN_GEN_ALL_CORE_FT
PRINCIPAL DISCHARGE DIAGNOSIS  Diagnosis: Acute on chronic heart failure  Assessment and Plan of Treatment:       SECONDARY DISCHARGE DIAGNOSES  Diagnosis: Fluid overload  Assessment and Plan of Treatment:     Diagnosis: Transaminitis  Assessment and Plan of Treatment:     Diagnosis: Leukocytosis  Assessment and Plan of Treatment:

## 2024-09-25 NOTE — DISCHARGE NOTE PROVIDER - HOSPITAL COURSE
89yo Nepali-speaking F with moderate-severe aortic stenosis, HFpEF with apical hypokinesis, CAD with 80% stenosis in pLAD and 99% stenosis of otial D1 ([facility] on 9/09/24), HTN, DM, and CLL who was admitted to [facility] on 9/16/24 with volume overload and was transferred to [facility] for further work-up and management. CTA chest/abd/pelvis with challenging anatomy, moderate bilateral effusions, and "several thoracic vertebral body compression deformities... metastatic disease cannot be entirely excluded and further evaluation is recommended." Never smoker, had normal mammograms up to age 66, no history of colonoscopy. Since there are no gross masses on her CT studies, she is likely still a TAVR candidate. Will get CT chest noncontrast when euvolemic and plan to will follow-up with Dr. Murray as an outpatient once euvolemic. Patient has been diuresis with lasix 40 mg IV BID . IVC on 9/24 was 1.4 cm and collapsing. Patient will transition to lasix 40 mg PO daily. Patient will follow up with Dr. España outpatient.   Patient is stable to be discharged

## 2024-09-25 NOTE — DISCHARGE NOTE PROVIDER - NSDCFUSCHEDAPPT_GEN_ALL_CORE_FT
[facility]  CARDIOLOGY [address]  Scheduled Appointment: 10/04/2024

## 2024-09-25 NOTE — DISCHARGE NOTE PROVIDER - PROVIDER TOKENS
PROVIDER:[TOKEN:[[id number]],SCHEDULEDAPPT:[10/04/2024],SCHEDULEDAPPTTIME:[10:30 AM],ESTABLISHEDPATIENT:[T]]

## 2024-09-25 NOTE — DISCHARGE NOTE PROVIDER - ATTENDING DISCHARGE PHYSICAL EXAMINATION:
Patient seen and examined. Pertinent labs, imaging and telemetry reviewed. I agree with the above:     Patient feeling well.  RRR. S1S2 present. 3/6 systolic murmur.   CTA B/L   Trace pitting edema B/L LE.       Patient is stable for discharge home with outpatient follow up.

## 2024-09-25 NOTE — DISCHARGE NOTE PROVIDER - CARE PROVIDER_API CALL
[facility]  Cardiovascular Disease  [address] [place] [id number]  Phone: [phone]  Fax: [phone]  Established Patient  Scheduled Appointment: 10/04/2024 10:30 AM

## 2024-09-25 NOTE — PROGRESS NOTE ADULT - SUBJECTIVE AND OBJECTIVE BOX
Chief complaint: Patient is a 88y old  Female who presents with a chief complaint of shortness of breath (17 Sep 2024 14:05)    Hospital Course: 88F pmhx DM, HTN, aortic stenosis, DM, CAD, D-CHF presents for shortness of breath overnight. Patient was hospitalized last week with dyspnea and chest pain, underwent stress test which was positive. Patient then had left heart cath which identified single vessel disease and severe aortic stenosis.  Patient was supposed to follow up with Dr. España in a few days outpatient. Patient was on lasix during last admission but was not discharged home with it. Patient denies chest pain. She states that shortness of breath feels better after initial dose of lasix and oxygen administration. Patient also with increased swelling of lower extremities     Interval history: Pt was seen and examined at bedside in NAD. No overnight events Complaints of back pain.     Review of systems: A complete 10-point review of systems was obtained and is negative except as stated in the interval history.    Vitals:  ICU Vital Signs Last 24 Hrs  T(C): 36.4 (24 Sep 2024 07:57), Max: 36.6 (23 Sep 2024 23:57)  T(F): 97.5 (24 Sep 2024 07:57), Max: 97.8 (23 Sep 2024 23:57)  HR: 67 (24 Sep 2024 13:38) (62 - 67)  BP: 121/57 (24 Sep 2024 13:38) (121/57 - 143/66)  BP(mean): 82 (24 Sep 2024 13:38) (82 - 95)  RR: 18 (24 Sep 2024 07:57) (18 - 19)  SpO2: 98% (24 Sep 2024 04:49) (95% - 98%)    O2 Parameters below as of 24 Sep 2024 04:49  Patient On (Oxygen Delivery Method): room air        ir                  Ins & outs:     09-16 @ 07:01  -  09-17 @ 07:00  --------------------------------------------------------  IN: 0 mL / OUT: 200 mL / NET: -200 mL    09-17 @ 07:01  -  09-18 @ 02:05  --------------------------------------------------------  IN: 0 mL / OUT: 1050 mL / NET: -1050 mL    18 Sep 2024 07:01  -  19 Sep 2024 07:00  --------------------------------------------------------  IN: 788 mL / OUT: 1950 mL / NET: -1162 mL    20 Sep 2024 07:01  -  20 Sep 2024 17:53  --------------------------------------------------------  IN: 0 mL / OUT: 900 mL / NET: -900 mL    21 Sep 2024 07:01  -  22 Sep 2024 07:00  --------------------------------------------------------  IN: 600 mL / OUT: 2175 mL / NET: -1575 mL    22 Sep 2024 07:01  -  22 Sep 2024 10:43  --------------------------------------------------------  IN: 0 mL / OUT: 200 mL / NET: -200 mL    22 Sep 2024 07:01  -  23 Sep 2024 07:00  --------------------------------------------------------  IN: 577 mL / OUT: 2705 mL / NET: -2128 mL    I&O's Summary    23 Sep 2024 07:01  -  24 Sep 2024 07:00  --------------------------------------------------------  IN: 1060 mL / OUT: 1400 mL / NET: -340 mL    24 Sep 2024 07:01  -  24 Sep 2024 16:06  --------------------------------------------------------  IN: 220 mL / OUT: 600 mL / NET: -380 mL          Weight trend:  Weight (kg): 49.3 (09-16)    Physical exam:  General: No apparent distress  HEENT: Anicteric sclera. Moist mucous membranes.   Cardiac: Regular rate and rhythm. No murmurs, rubs, or gallops.   Vascular: Symmetric radial pulses. Dorsalis pedis pulses palpable.   Respiratory: Normal effort. b/l  decreased sounds  Abdomen: Soft, nontender. Audible bowel sounds.   Extremities: Warm with +1 b/L LE edema. No cyanosis or clubbing.   Skin: Warm and dry. No rash.   Neurologic: Grossly normal motor function.   Psychiatric: Oriented to person, place, and time       Data reviewed:  - Telemetry: NSR HR: 56-70bpm    - ECG 9/4/24: Diagnosis Line Sinus bradycardia. Left anterior fascicular block. Anteroseptal infarct , age undetermined. T wave abnormality, consider inferolateral ischemia. with 59bpm     - TTE 9/4/24:   1. Left ventricular ejection fraction, by visual estimation, is 50 to   55%.   2. Mildly decreased segmental left ventricular systolic function.   3. Mid and apical anterior septum, apical inferior segment, and apex are   abnormal as described above.   4. Mild left ventricular hypertrophy.   5. Normal right ventricular size and function.   6. Mild to moderately enlarged left atrium.   7. Mild mitral annular calcification.   8. Mild to moderate mitral valve regurgitation.   9. Moderate tricuspid regurgitation.  10. Mild aortic regurgitation.  11. Moderate to severe aortic valve stenosis.  12. Mild pulmonic valve regurgitation.  13. There is mild aortic root calcification.    - Chest x-ray 9/16/24:   Stable bilateral lung opacities  Prominence to the heart and mediastinum.  Further evaluation with a PA view the chest is recommended    - Stress test 9/4/24:   1.   Small to moderate size severe reversible defect in the inferolateral   wall and small reversible defect in the anteroseptal wall of the left   ventricle consistent with ischemia and suggesting ischemia in multiple   coronary territories.  2.  Dilated left ventricle with mild global hypokinesis. All walls of the   left ventricle thicken.  3.  Left ventricular ejection fraction calculated as 46% which is low.   Wall motion analysis suggests ejection fraction of 40-45%.   Echocardiographic ejection fraction by visual estimation 50-55% on   09/04/2024    - Cardiac catheterization:   9/9/24:   POST-OP DIAGNOSIS:    Severe single vessel disease  Severe AS  Coronary Dominance: Right  LM: short segment engaged  LAD: Medium Sized Vessel  pLAD: 85% lesion at distal third   mLAD: moderate atherosclerotic disease  dLAD: mild atherosclerotic disease  D1: severe ostial stenosis, small vessel  D2: minor luminal irregularities  CX: medium sized vessel  pLcx: minor luminal irregularities  dLcx: tortuous vessel with minor luminal irregularities  OM1: Large tortuous vessel, no significant disease  RCA: Medium sized, dominant vessel  pRCA: mild atherosclerotic disease  mRCA: mild atherosclerotic disease  dRCA: minor luminal irregularities  RPDA: no significant disease  RPL: no significant disease    LVEDP: 15 mmHg     EF: 50 % by Echo    PA % sat: 75.2%  SaO2 % sat: 98.5%    RA pressure: 9 mmHg  RV pressure: 40/13 mmHg  PA pressure: 44/17/25 mmHg  PCWP: 23/17/14 mmHg    CVP: 9 mmHg  CO/CI Jc: 3.24 L/min - 2.16 L/min/m2  PVR (woods units): 2.47 Woods units  SVR (Dynes.s/cm5):   1728 dynes.s/cm5    AV gradient: 40 mmHg (peak to peak gradient)  ALEX: 0.51 cm2 (by Hakki equation)            - Labs:                        11.1   28.46 )-----------( 214      ( 24 Sep 2024 06:02 )             35.1     09-24    139  |  101  |  37[H]  ----------------------------<  61[L]  5.0   |  24  |  0.6[L]    Ca    8.9      24 Sep 2024 06:02  Mg     2.4     09-24                  Lactate Trend    Urinalysis Basic - ( 24 Sep 2024 06:02 )    Color: x / Appearance: x / SG: x / pH: x  Gluc: 61 mg/dL / Ketone: x  / Bili: x / Urobili: x   Blood: x / Protein: x / Nitrite: x   Leuk Esterase: x / RBC: x / WBC x   Sq Epi: x / Non Sq Epi: x / Bacteria: x                            Medications:  MEDICATIONS  (STANDING):  aspirin enteric coated 81 milliGRAM(s) Oral daily  atorvastatin 20 milliGRAM(s) Oral at bedtime  carvedilol 3.125 milliGRAM(s) Oral every 12 hours  chlorhexidine 2% Cloths 1 Application(s) Topical daily  dextrose 5%. 1000 milliLiter(s) (50 mL/Hr) IV Continuous <Continuous>  dextrose 5%. 1000 milliLiter(s) (100 mL/Hr) IV Continuous <Continuous>  dextrose 50% Injectable 25 Gram(s) IV Push once  dextrose 50% Injectable 12.5 Gram(s) IV Push once  dextrose 50% Injectable 25 Gram(s) IV Push once  enoxaparin Injectable 40 milliGRAM(s) SubCutaneous every 24 hours  furosemide   Injectable 40 milliGRAM(s) IV Push daily  gabapentin 100 milliGRAM(s) Oral every 8 hours  glucagon  Injectable 1 milliGRAM(s) IntraMuscular once  insulin glargine Injectable (LANTUS) 10 Unit(s) SubCutaneous at bedtime  insulin lispro (ADMELOG) corrective regimen sliding scale   SubCutaneous three times a day before meals  iron sucrose IVPB 200 milliGRAM(s) IV Intermittent every 24 hours  losartan 50 milliGRAM(s) Oral every 12 hours  polyethylene glycol 3350 17 Gram(s) Oral two times a day  senna 2 Tablet(s) Oral at bedtime    MEDICATIONS  (PRN):  acetaminophen     Tablet .. 650 milliGRAM(s) Oral every 6 hours PRN Mild Pain (1 - 3)  dextrose Oral Gel 15 Gram(s) Oral once PRN Blood Glucose LESS THAN 70 milliGRAM(s)/deciliter    Allergies    No Known Drug Allergies  Alcohol (Hives; Angioedema)    Intolerances

## 2024-09-27 DIAGNOSIS — I11.0 HYPERTENSIVE HEART DISEASE WITH HEART FAILURE: ICD-10-CM

## 2024-09-27 DIAGNOSIS — D64.9 ANEMIA, UNSPECIFIED: ICD-10-CM

## 2024-09-27 DIAGNOSIS — R74.01 ELEVATION OF LEVELS OF LIVER TRANSAMINASE LEVELS: ICD-10-CM

## 2024-09-27 DIAGNOSIS — Z96.649 PRESENCE OF UNSPECIFIED ARTIFICIAL HIP JOINT: ICD-10-CM

## 2024-09-27 DIAGNOSIS — I35.0 NONRHEUMATIC AORTIC (VALVE) STENOSIS: ICD-10-CM

## 2024-09-27 DIAGNOSIS — I50.33 ACUTE ON CHRONIC DIASTOLIC (CONGESTIVE) HEART FAILURE: ICD-10-CM

## 2024-09-27 DIAGNOSIS — K59.00 CONSTIPATION, UNSPECIFIED: ICD-10-CM

## 2024-09-27 DIAGNOSIS — Z91.09 OTHER ALLERGY STATUS, OTHER THAN TO DRUGS AND BIOLOGICAL SUBSTANCES: ICD-10-CM

## 2024-09-27 DIAGNOSIS — Z79.4 LONG TERM (CURRENT) USE OF INSULIN: ICD-10-CM

## 2024-09-27 DIAGNOSIS — E11.40 TYPE 2 DIABETES MELLITUS WITH DIABETIC NEUROPATHY, UNSPECIFIED: ICD-10-CM

## 2024-09-27 DIAGNOSIS — K08.109 COMPLETE LOSS OF TEETH, UNSPECIFIED CAUSE, UNSPECIFIED CLASS: ICD-10-CM

## 2024-09-27 DIAGNOSIS — Z79.84 LONG TERM (CURRENT) USE OF ORAL HYPOGLYCEMIC DRUGS: ICD-10-CM

## 2024-09-27 DIAGNOSIS — M48.54XA COLLAPSED VERTEBRA, NOT ELSEWHERE CLASSIFIED, THORACIC REGION, INITIAL ENCOUNTER FOR FRACTURE: ICD-10-CM

## 2024-09-27 DIAGNOSIS — Z79.82 LONG TERM (CURRENT) USE OF ASPIRIN: ICD-10-CM

## 2024-09-27 DIAGNOSIS — C91.10 CHRONIC LYMPHOCYTIC LEUKEMIA OF B-CELL TYPE NOT HAVING ACHIEVED REMISSION: ICD-10-CM

## 2024-09-27 DIAGNOSIS — I25.10 ATHEROSCLEROTIC HEART DISEASE OF NATIVE CORONARY ARTERY WITHOUT ANGINA PECTORIS: ICD-10-CM

## 2024-09-27 DIAGNOSIS — J96.01 ACUTE RESPIRATORY FAILURE WITH HYPOXIA: ICD-10-CM

## 2024-09-27 DIAGNOSIS — I21.A1 MYOCARDIAL INFARCTION TYPE 2: ICD-10-CM

## 2024-09-27 DIAGNOSIS — E87.70 FLUID OVERLOAD, UNSPECIFIED: ICD-10-CM

## 2024-09-27 LAB — T3 SERPL-MCNC: 53 NG/DL — LOW

## 2024-10-04 ENCOUNTER — APPOINTMENT (OUTPATIENT)
Dept: CARDIOLOGY | Facility: CLINIC | Age: 89
End: 2024-10-04
Payer: MEDICAID

## 2024-10-04 VITALS
DIASTOLIC BLOOD PRESSURE: 72 MMHG | HEIGHT: 60 IN | HEART RATE: 70 BPM | WEIGHT: 107 LBS | BODY MASS INDEX: 21.01 KG/M2 | SYSTOLIC BLOOD PRESSURE: 126 MMHG | OXYGEN SATURATION: 96 %

## 2024-10-04 DIAGNOSIS — C91.10 CHRONIC LYMPHOCYTIC LEUKEMIA OF B-CELL TYPE NOT HAVING ACHIEVED REMISSION: ICD-10-CM

## 2024-10-04 DIAGNOSIS — E78.5 HYPERLIPIDEMIA, UNSPECIFIED: ICD-10-CM

## 2024-10-04 DIAGNOSIS — G89.29 DORSALGIA, UNSPECIFIED: ICD-10-CM

## 2024-10-04 DIAGNOSIS — E11.9 TYPE 2 DIABETES MELLITUS W/OUT COMPLICATIONS: ICD-10-CM

## 2024-10-04 DIAGNOSIS — M54.9 DORSALGIA, UNSPECIFIED: ICD-10-CM

## 2024-10-04 DIAGNOSIS — I35.0 NONRHEUMATIC AORTIC (VALVE) STENOSIS: ICD-10-CM

## 2024-10-04 DIAGNOSIS — I10 ESSENTIAL (PRIMARY) HYPERTENSION: ICD-10-CM

## 2024-10-04 PROBLEM — I25.10 CAD (CORONARY ARTERY DISEASE): Status: ACTIVE | Noted: 2024-10-04

## 2024-10-04 PROBLEM — I50.30 (HFPEF) HEART FAILURE WITH PRESERVED EJECTION FRACTION: Status: ACTIVE | Noted: 2024-10-04

## 2024-10-04 PROCEDURE — 99214 OFFICE O/P EST MOD 30 MIN: CPT

## 2024-10-04 PROCEDURE — G2211 COMPLEX E/M VISIT ADD ON: CPT | Mod: NC

## 2024-10-04 PROCEDURE — 93000 ELECTROCARDIOGRAM COMPLETE: CPT

## 2024-10-04 RX ORDER — ASPIRIN 81 MG
81 TABLET, DELAYED RELEASE (ENTERIC COATED) ORAL
Refills: 0 | Status: ACTIVE | COMMUNITY

## 2024-10-04 RX ORDER — EMPAGLIFLOZIN 25 MG/1
25 TABLET, FILM COATED ORAL
Refills: 0 | Status: ACTIVE | COMMUNITY

## 2024-10-04 RX ORDER — FUROSEMIDE 40 MG/1
40 TABLET ORAL
Refills: 0 | Status: ACTIVE | COMMUNITY

## 2024-10-04 RX ORDER — INSULIN GLARGINE 100 [IU]/ML
INJECTION, SOLUTION SUBCUTANEOUS
Refills: 0 | Status: ACTIVE | COMMUNITY

## 2024-10-04 RX ORDER — LOSARTAN POTASSIUM 50 MG/1
50 TABLET, FILM COATED ORAL TWICE DAILY
Refills: 0 | Status: ACTIVE | COMMUNITY

## 2024-10-04 RX ORDER — TRAMADOL HYDROCHLORIDE 25 MG/1
25 TABLET, COATED ORAL 3 TIMES DAILY
Qty: 90 | Refills: 0 | Status: ACTIVE | COMMUNITY
Start: 2024-10-04 | End: 1900-01-01

## 2024-10-04 RX ORDER — SITAGLIPTIN AND METFORMIN HYDROCHLORIDE 50; 500 MG/1; MG/1
50-500 TABLET, FILM COATED ORAL
Refills: 0 | Status: ACTIVE | COMMUNITY

## 2024-10-04 RX ORDER — ATORVASTATIN CALCIUM 20 MG/1
20 TABLET, FILM COATED ORAL
Refills: 0 | Status: ACTIVE | COMMUNITY

## 2024-10-04 NOTE — HISTORY OF PRESENT ILLNESS
[FreeTextEntry1] : Ms. Beal is an 88yo F with PMHx of mod-severe AS, HFpEF, CAD (pLAD, oD1), HTN, DM, CLL who presents to [facility] care. Her PMD is Dr. Tim Haq. Patient was first hospitalized in September 2024 for SOB and found to be in volume overload. She was diuresed and found to have mod-severe AS. She went for [facility] and found to have severe 2VD. She was discharged for outpatient TAVR work up. She came back 1 week later with volume overload. Pt was diuresed and pending discussion of possible TAVR in near future.

## 2024-10-04 NOTE — ASSESSMENT
[FreeTextEntry1] : AS: Mod-severe AS -Follow up with Dr. Murray regarding TAVR procedure.  -Continue with home diuresis.   CAD:  -Pt will need PCI prior to TAVR.  -Continue with ASA, atorvastatin 20mg.   HTN: BP at goal per ACC/AHA 2018 guidelines -Continue with losartan 50mg PO daily.   CLL:  -Needs oncology eval.  -Oncology referral.   DM:  -Pt to decrease lantus due to some AM hypoglycemia.  -Continue to follow with endocrine.   Follow up in 2 months.

## 2024-10-04 NOTE — PHYSICAL EXAM
[Well Developed] : well developed [Well Nourished] : well nourished [No Acute Distress] : no acute distress [Normal Conjunctiva] : normal conjunctiva [Normal Venous Pressure] : normal venous pressure [No Carotid Bruit] : no carotid bruit [5th Left ICS - MCL] : palpated at the 5th LICS in the midclavicular line [Normal] : normal [No Precordial Heave] : no precordial heave was noted [Normal Rate] : normal [Rhythm Regular] : regular [Normal S1] : normal S1 [Normal S2] : normal S2 [III] : a grade 3 [No Pitting Edema] : no pitting edema present [2+] : left 2+ [Clear Lung Fields] : clear lung fields [Good Air Entry] : good air entry [No Respiratory Distress] : no respiratory distress  [Soft] : abdomen soft [Non Tender] : non-tender [No Masses/organomegaly] : no masses/organomegaly [Normal Bowel Sounds] : normal bowel sounds [Normal Gait] : normal gait [No Edema] : no edema [No Cyanosis] : no cyanosis [No Clubbing] : no clubbing [No Varicosities] : no varicosities [No Rash] : no rash [No Skin Lesions] : no skin lesions [Moves all extremities] : moves all extremities [Normal Speech] : normal speech [No Focal Deficits] : no focal deficits [Alert and Oriented] : alert and oriented [Normal memory] : normal memory

## 2024-10-04 NOTE — REASON FOR VISIT
[Other: ____] : [unfilled] [FreeTextEntry1] : Diagnostic Tests: ------------------------ EKG:  10/04/24-NSR. TWI inferior, V2-6.  ------------------------ Echo:  09/04/24-TTE: EF 50-55%. Mid-apical anterior, apical inferior, apex hypokinetic. Mild LVH. Mild-mod LAE. Mod-severe AS. Mild AI. Mild MAC. Mild-mod MR. Mod TR. Mild PI.  ----------------------- Stress:  09/04/24-Lexiscan MPI: Small-mod defect in inferolateral, anteroseptal ischemia. LVEF 46%.  ----------------------- Cath:  09/09/24-LHC: LM none, pLAD 85%, mLAD mod, dLAD mild, D1 severe, LCx minor, RCA mild. LVEDP 15 mmHg.  ---------------------- US:  09/19/24-Carotid: <50% stenosis.

## 2024-10-10 ENCOUNTER — APPOINTMENT (OUTPATIENT)
Dept: CARDIOLOGY | Facility: CLINIC | Age: 89
End: 2024-10-10

## 2024-10-10 ENCOUNTER — APPOINTMENT (OUTPATIENT)
Dept: CARDIOTHORACIC SURGERY | Facility: CLINIC | Age: 89
End: 2024-10-10

## 2024-10-10 VITALS
DIASTOLIC BLOOD PRESSURE: 67 MMHG | TEMPERATURE: 97.9 F | HEART RATE: 62 BPM | RESPIRATION RATE: 14 BRPM | OXYGEN SATURATION: 94 % | WEIGHT: 107 LBS | BODY MASS INDEX: 21.01 KG/M2 | SYSTOLIC BLOOD PRESSURE: 136 MMHG | HEIGHT: 60 IN

## 2024-10-10 VITALS
TEMPERATURE: 97.9 F | HEIGHT: 70 IN | DIASTOLIC BLOOD PRESSURE: 67 MMHG | HEART RATE: 62 BPM | BODY MASS INDEX: 15.32 KG/M2 | WEIGHT: 107 LBS | RESPIRATION RATE: 14 BRPM | OXYGEN SATURATION: 94 % | SYSTOLIC BLOOD PRESSURE: 136 MMHG

## 2024-10-10 DIAGNOSIS — I50.30 UNSPECIFIED DIASTOLIC (CONGESTIVE) HEART FAILURE: ICD-10-CM

## 2024-10-10 DIAGNOSIS — I25.10 ATHEROSCLEROTIC HEART DISEASE OF NATIVE CORONARY ARTERY W/OUT ANGINA PECTORIS: ICD-10-CM

## 2024-10-10 PROCEDURE — 99214 OFFICE O/P EST MOD 30 MIN: CPT

## 2024-12-10 ENCOUNTER — APPOINTMENT (OUTPATIENT)
Dept: CARDIOLOGY | Facility: CLINIC | Age: 89
End: 2024-12-10
Payer: MEDICAID

## 2024-12-10 VITALS
HEIGHT: 60 IN | SYSTOLIC BLOOD PRESSURE: 128 MMHG | BODY MASS INDEX: 20.62 KG/M2 | OXYGEN SATURATION: 100 % | WEIGHT: 105 LBS | HEART RATE: 60 BPM | DIASTOLIC BLOOD PRESSURE: 80 MMHG

## 2024-12-10 DIAGNOSIS — I10 ESSENTIAL (PRIMARY) HYPERTENSION: ICD-10-CM

## 2024-12-10 DIAGNOSIS — I25.10 ATHEROSCLEROTIC HEART DISEASE OF NATIVE CORONARY ARTERY W/OUT ANGINA PECTORIS: ICD-10-CM

## 2024-12-10 DIAGNOSIS — I35.0 NONRHEUMATIC AORTIC (VALVE) STENOSIS: ICD-10-CM

## 2024-12-10 DIAGNOSIS — E78.5 HYPERLIPIDEMIA, UNSPECIFIED: ICD-10-CM

## 2024-12-10 DIAGNOSIS — I50.30 UNSPECIFIED DIASTOLIC (CONGESTIVE) HEART FAILURE: ICD-10-CM

## 2024-12-10 DIAGNOSIS — E11.9 TYPE 2 DIABETES MELLITUS W/OUT COMPLICATIONS: ICD-10-CM

## 2024-12-10 DIAGNOSIS — C91.10 CHRONIC LYMPHOCYTIC LEUKEMIA OF B-CELL TYPE NOT HAVING ACHIEVED REMISSION: ICD-10-CM

## 2024-12-10 PROCEDURE — 99214 OFFICE O/P EST MOD 30 MIN: CPT

## 2024-12-10 PROCEDURE — G2211 COMPLEX E/M VISIT ADD ON: CPT | Mod: NC

## 2024-12-10 RX ORDER — INSULIN LISPRO 100 [IU]/ML
100 INJECTION, SOLUTION INTRAVENOUS; SUBCUTANEOUS
Refills: 0 | Status: ACTIVE | COMMUNITY

## 2025-02-24 ENCOUNTER — APPOINTMENT (OUTPATIENT)
Dept: CARDIOLOGY | Facility: CLINIC | Age: 89
End: 2025-02-24

## 2025-04-15 ENCOUNTER — APPOINTMENT (OUTPATIENT)
Dept: CARDIOLOGY | Facility: CLINIC | Age: 89
End: 2025-04-15

## 2025-06-24 NOTE — ED ADULT TRIAGE NOTE - AS TEMP SITE
Pending. Last send 10/2024 with 3 refills  
Refill request:    BANOPHEN 25 MG capsule     [facility].  
oral

## 2025-07-03 NOTE — PHYSICAL THERAPY INITIAL EVALUATION ADULT - ACTIVE RANGE OF MOTION EXAMINATION, REHAB EVAL
Continue home Toprol XL and Cardizem  BP currently stable   Both upper extremities/Both lower extremities joints within functional limits and painfree AAROM/AROM